# Patient Record
Sex: FEMALE | Race: WHITE | NOT HISPANIC OR LATINO | Employment: OTHER | URBAN - METROPOLITAN AREA
[De-identification: names, ages, dates, MRNs, and addresses within clinical notes are randomized per-mention and may not be internally consistent; named-entity substitution may affect disease eponyms.]

---

## 2017-01-02 ENCOUNTER — OFFICE VISIT (OUTPATIENT)
Dept: DERMATOLOGY | Facility: CLINIC | Age: 67
End: 2017-01-02

## 2017-01-02 DIAGNOSIS — C84.00 MYCOSIS FUNGOIDES (H): ICD-10-CM

## 2017-01-02 NOTE — PROGRESS NOTES
[facility] Dermatology Phototherapy Record  1. 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    NO    2. The patient tolerated phototherapy without complication    Patient will return for next UVB treatment, per protocol.     Patient to see provider every 4-12 weeks for follow-up during treatment.      All questions and concerns discussed with patient in clinic today.      Jordyn Ricci

## 2017-01-06 ENCOUNTER — OFFICE VISIT (OUTPATIENT)
Dept: DERMATOLOGY | Facility: CLINIC | Age: 67
End: 2017-01-06

## 2017-01-06 DIAGNOSIS — C84.00 MYCOSIS FUNGOIDES (H): ICD-10-CM

## 2017-01-06 NOTE — PROGRESS NOTES
[facility] Dermatology Phototherapy Record  1. Lyndsey Hagen is a 66 year old female is here today for phototherapy (UVA1) treatment for Mycosis fungoides.        Changes or new medications since last treatment:   NO    New medical conditions or problems since last treatment:   NO    Any problems with last phototherapy treatment?    NO    2. The patient tolerated phototherapy without complication    Patient will return for next UVA1 treatment, per protocol.     Patient to see provider every 4-12 weeks for follow-up during treatment.      All questions and concerns discussed with patient in clinic today.      Anu Davidson

## 2017-01-09 ENCOUNTER — OFFICE VISIT (OUTPATIENT)
Dept: DERMATOLOGY | Facility: CLINIC | Age: 67
End: 2017-01-09

## 2017-01-09 DIAGNOSIS — C84.00 MYCOSIS FUNGOIDES (H): ICD-10-CM

## 2017-01-09 NOTE — PROGRESS NOTES
[facility] Dermatology Phototherapy Record  1. Lyndsey Hagen is a 66 year old female is here today for phototherapy (UVA1) treatment for Mycosis fungoides .        Changes or new medications since last treatment:   NO    New medical conditions or problems since last treatment:   NO    Any problems with last phototherapy treatment?    NO    2. The patient tolerated phototherapy without complication    Patient will return for next UVB treatment, per protocol.     Patient to see provider every 4-12 weeks for follow-up during treatment.      All questions and concerns discussed with patient in clinic today.      Anu Davidson

## 2017-01-13 ENCOUNTER — OFFICE VISIT (OUTPATIENT)
Dept: DERMATOLOGY | Facility: CLINIC | Age: 67
End: 2017-01-13

## 2017-01-13 DIAGNOSIS — C84.00 MYCOSIS FUNGOIDES (H): ICD-10-CM

## 2017-01-13 NOTE — PROGRESS NOTES
[facility] Dermatology Phototherapy Record  1. Lyndsey Hagen is a 66 year old female is here today for phototherapy (UVA1) treatment for Mycosis fungoides .        Changes or new medications since last treatment:   NO    New medical conditions or problems since last treatment:   NO    Any problems with last phototherapy treatment?    NO    2. The patient tolerated phototherapy without complication    Patient will return for next UVA1 treatment, per protocol.     Patient to see provider every 4-12 weeks for follow-up during treatment.      All questions and concerns discussed with patient in clinic today.      Anu Davidson

## 2017-01-17 ENCOUNTER — OFFICE VISIT (OUTPATIENT)
Dept: DERMATOLOGY | Facility: CLINIC | Age: 67
End: 2017-01-17

## 2017-01-17 DIAGNOSIS — C84.00 MYCOSIS FUNGOIDES (H): ICD-10-CM

## 2017-01-20 ENCOUNTER — OFFICE VISIT (OUTPATIENT)
Dept: DERMATOLOGY | Facility: CLINIC | Age: 67
End: 2017-01-20

## 2017-01-20 DIAGNOSIS — C84.00 MYCOSIS FUNGOIDES (H): ICD-10-CM

## 2017-01-20 NOTE — PROGRESS NOTES
[facility] Dermatology Phototherapy Record  1. Lyndsey Hagen is a 66 year old female is here today for phototherapy (UVA1) treatment for Mycosis fungoides .          Changes or new medications since last treatment:   NO    New medical conditions or problems since last treatment:   NO    Any problems with last phototherapy treatment?    NO    2. The patient tolerated phototherapy without complication    Patient will return for next UVA1 treatment, per protocol.      Patient to see provider every 4-12 weeks for follow-up during treatment.       All questions and concerns discussed with patient in clinic today.

## 2017-01-24 ENCOUNTER — OFFICE VISIT (OUTPATIENT)
Dept: DERMATOLOGY | Facility: CLINIC | Age: 67
End: 2017-01-24

## 2017-01-24 DIAGNOSIS — C84.00 MYCOSIS FUNGOIDES (H): ICD-10-CM

## 2017-01-24 NOTE — PROGRESS NOTES
[facility] Dermatology Phototherapy Record  1. Lyndsey Hagen is a 66 year old female is here today for phototherapy (UVA1) treatment for Mycosis fungoides .        Changes or new medications since last treatment:   NO    New medical conditions or problems since last treatment:   NO    Any problems with last phototherapy treatment?    NO    2. The patient tolerated phototherapy without complication    Patient will return for next UVA treatment, per protocol.     Patient to see provider every 4-12 weeks for follow-up during treatment.      All questions and concerns discussed with patient in clinic today.      Pooja Jordan

## 2017-01-27 ENCOUNTER — OFFICE VISIT (OUTPATIENT)
Dept: DERMATOLOGY | Facility: CLINIC | Age: 67
End: 2017-01-27

## 2017-01-27 DIAGNOSIS — C84.00 MYCOSIS FUNGOIDES (H): ICD-10-CM

## 2017-01-27 NOTE — PROGRESS NOTES
[facility] Dermatology Phototherapy Record  1. Lyndsey Hagen is a 66 year old female is here today for phototherapy (UVA) treatment for Mycosis fungoides.        Changes or new medications since last treatment:   NO    New medical conditions or problems since last treatment:   NO    Any problems with last phototherapy treatment?    NO    2. The patient tolerated phototherapy without complication    Patient will return for next UVA treatment, per protocol.     Patient to see provider every 4-12 weeks for follow-up during treatment.      All questions and concerns discussed with patient in clinic today.      Jordyn Ricci

## 2017-01-30 ENCOUNTER — OFFICE VISIT (OUTPATIENT)
Dept: DERMATOLOGY | Facility: CLINIC | Age: 67
End: 2017-01-30

## 2017-01-30 DIAGNOSIS — C84.A0 CUTANEOUS T-CELL LYMPHOMA (H): Primary | ICD-10-CM

## 2017-01-30 NOTE — PROGRESS NOTES
[facility] Dermatology Phototherapy Record  1. Lyndsey Hagen is a 66 year old female is here today for phototherapy (UVB) treatment for CTCL.        Changes or new medications since last treatment:   NO    New medical conditions or problems since last treatment:   NO    Any problems with last phototherapy treatment?    NO    2. The patient tolerated phototherapy without complication    Patient will return for next UVB treatment, per protocol.     Patient to see provider every 4-12 weeks for follow-up during treatment.      All questions and concerns discussed with patient in clinic today.      Anu Davidson

## 2017-02-03 ENCOUNTER — OFFICE VISIT (OUTPATIENT)
Dept: DERMATOLOGY | Facility: CLINIC | Age: 67
End: 2017-02-03

## 2017-02-03 DIAGNOSIS — C84.A0 CUTANEOUS T-CELL LYMPHOMA (H): ICD-10-CM

## 2017-02-03 NOTE — PROGRESS NOTES
[facility] Dermatology Phototherapy Record  1. Lyndsey Hagen is a 66 year old female is here today for phototherapy (UVA) treatment for Cutaneous T-cell lymphoma.        Changes or new medications since last treatment:   NO    New medical conditions or problems since last treatment:   NO    Any problems with last phototherapy treatment?    NO    2. The patient tolerated phototherapy without complication    Patient will return for next UVA treatment, per protocol.     Patient to see provider every 4-12 weeks for follow-up during treatment.      All questions and concerns discussed with patient in clinic today.      Jordyn Ricci

## 2017-02-20 ENCOUNTER — RECORDS - HEALTHEAST (OUTPATIENT)
Dept: ADMINISTRATIVE | Facility: OTHER | Age: 67
End: 2017-02-20

## 2017-02-20 ENCOUNTER — OFFICE VISIT (OUTPATIENT)
Dept: DERMATOLOGY | Facility: CLINIC | Age: 67
End: 2017-02-20

## 2017-02-20 VITALS — HEART RATE: 87 BPM | SYSTOLIC BLOOD PRESSURE: 118 MMHG | DIASTOLIC BLOOD PRESSURE: 75 MMHG

## 2017-02-20 DIAGNOSIS — C84.A8 CUTANEOUS T-CELL LYMPHOMA INVOLVING LYMPH NODES OF MULTIPLE REGIONS (H): Primary | ICD-10-CM

## 2017-02-20 ASSESSMENT — PAIN SCALES - GENERAL: PAINLEVEL: NO PAIN (0)

## 2017-02-20 NOTE — PROGRESS NOTES
"[facility] Dermatology Note      Dermatology Problem List:  FBSE 2/20/17  1. CTCL, stage IIB  -Previously treated with PUVA with good response. Treated at [place] prior to establishing care here. Stopped due to difficulty percuring oxsoralen and risks associated with PUVA.  -Switched to UVA1 (started 4/16)+ targretin 1% gel (added on 5/23/16) with good response.   -Triamcinolone 0.1% cream as needed for irritation from targretin gel.    Encounter Date: Feb 20, 2017    CC:   Chief Complaint   Patient presents with     Derm Problem     TCELL follow up, Lyndsey states \" I am good.\"         History of Present Illness:  Ms. Lyndsey Hagen is a 66 year old female who presents as a follow-up for CTCL. The patient was last seen 11/21/16 when she was continued on UVA1. Pt states that she is overall doing very well. She states that her involvement is somewhat improved. She is tolerating the UVA1 well and is not bothered by these treatments twice a week. She is continuing to use targretin as needed to more thicker, involved areas. She is currently using the targretin to a spot on her right hip with good effect. None of the areas are symptomatic at all. She is otherwise feeling very well without any complaints. She recently came back from a several week trip to [place] with her sisters. Denies any new, growing, changing, or otherwise bleeding areas.     Past Medical History:   Patient Active Problem List   Diagnosis     Cutaneous T-cell lymphoma (H)     Past Medical History   Diagnosis Date     Cutaneous T-cell lymphoma (H)      Past Surgical History   Procedure Laterality Date     No history of surgery  10/28/31     derm       Social History:  Son is ophthalmologist.  is OB/GYN.     Family History:  Not assessed today.    Medications:  Current Outpatient Prescriptions   Medication Sig Dispense Refill     triamcinolone (KENALOG) 0.1 % cream Use in morning to rash 454 g 5     Bexarotene (TARGRETIN) 1 % GEL Use " every other night to nightly 1 Tube 2     Multiple Vitamin (MULTIVITAMINS PO) Take  by mouth daily.       Probiotic Product (SOLUBLE FIBER/PROBIOTICS PO) Take 2 tablets by mouth daily.       KRILL OIL PO Take  by mouth daily.       Cholecalciferol (VITAMIN D-3 PO) Take 1 tablet by mouth daily.       Coenzyme Q10 (CO Q-10 PO) Take 1 tablet by mouth daily.          Allergies   Allergen Reactions     Nkda [No Known Drug Allergies]          Review of Systems:  -As per HPI  -Constitutional: The patient denies fatigue, fevers, chills, unintended weight loss, and night sweats.  -HEENT: Patient denies nonhealing oral sores.  -Skin: As above in HPI. No additional skin concerns.    Physical exam:  Vitals: /75  Pulse 87  GEN: This is a well developed, well-nourished female in no acute distress, in a pleasant mood.    SKIN: Total skin excluding the undergarment areas was performed. The exam included the head/face, neck, both arms, chest, back, abdomen, both legs, digits and/or nails.   -Skin is diffusely tanned.  -Scattered on the face, trunk, back, upper extremities, and left medial thigh, there are circular hypopigmented patches without epidermal change consistent with post-inflammatory hypopigmentation. Some have a slight pink erythema.  -On bilateral arms there are some scattered pink plaques.  -Right lateral hip with circular hypopigmented plaque with some nodularity and overlying scale  -Back with hypopigmented patches and diffuse roughness, but no raised plaques today.  -Numerous homogenous, regular appearing 2-6 mm light to dark brown macules on trunk, extremities, no concerning findings on dermoscopy  -No other lesions of concern on areas examined.   LYMPH NODES: No submandibular, cervical, axillary, or inguinal lymphadenopathy.    Impression/Plan:  1. CTCL Stage IIB with good response to UVA1. As patient is continuing to improve on current regimen of UVA1 + targretin we will plan to continue current treatment  and recheck in 3 months.     Continue UVA1 twice weekly.    Continue Targretin 1% gel once to twice daily as tolerated to bumpy/scaly remaining skin lesions. She has triamcinolone 0.1% cream at home in case targretin gel causes irritation.    RTC 3 months        Follow-up in 3 months, earlier for new or changing lesions.       Dr. Arlen Guillory staffed the patient.    Staff Involved:  Resident(Janak Guerrero)/Staff(as above)  .I was present for key portions of the history and exam.  See resident note for pertinent history, exam, and treatment plan.  Arlen Guillory MD

## 2017-02-20 NOTE — MR AVS SNAPSHOT
After Visit Summary   2/20/2017    Lyndsey Hagen    MRN: [id number]           Patient Information     Date Of Birth          1950        Visit Information        Provider Department      2/20/2017 10:15 AM Arlen Guillory MD [facility] Dermatology        Today's Diagnoses     Cutaneous T-cell lymphoma involving lymph nodes of multiple regions (H)    -  1       Follow-ups after your visit        Follow-up notes from your care team     Return in about 3 months (around 5/20/2017).      Your next 10 appointments already scheduled     Feb 22, 2017  7:00 AM CST   (Arrive by 6:45 AM)   Derm Light Extended with UC DERM LIGHT TREATMENT   [facility])    [address] [place] [address]   [phone]            Feb 24, 2017  7:30 AM CST   (Arrive by 7:15 AM)   Derm Light Extended with UC DERM LIGHT TREATMENT   [facility])    [address] [place] [phone]   [phone]            Feb 27, 2017  7:30 AM CST   (Arrive by 7:15 AM)   Derm Light Extended with UC DERM LIGHT TREATMENT   [facility] Dermatology [facility])    [address] [address]   [phone]            Mar 03, 2017  8:00 AM CST   (Arrive by 7:45 AM)   Derm Light Extended with UC DERM LIGHT TREATMENT   [facility])    [address] [place] [address]   [phone]            Mar 06, 2017  8:00 AM CST   (Arrive by 7:45 AM)   Derm Light Extended with UC DERM LIGHT TREATMENT   [facility])    [address] [place] [address]   [phone]            Mar 10, 2017  7:30 AM CST   (Arrive by 7:15 AM)   Derm Light Extended with UC DERM LIGHT TREATMENT   [facility] Dermatology ([facility])    [address] [place] [address]   [phone]            Mar 13, 2017  7:30 AM CDT   (Arrive by 7:15 AM)   Derm Light Extended with UC DERM LIGHT TREATMENT   [facility])    [address] [place] [address]   [phone]            Mar 15, 2017  8:00 AM CDT   (Arrive by 7:45 AM)   Derm Light Extended with UC DERM LIGHT TREATMENT   [facility])    [address] [place] [address]   [phone]            Mar 21, 2017  8:00 AM CDT   (Arrive by 7:45 AM)   Derm Light Extended with UC DERM LIGHT TREATMENT   [facility])    [address] [place] [address]   [phone]            Mar 24, 2017  7:30 AM CDT   (Arrive by 7:15 AM)   Derm Light Extended with UC DERM LIGHT TREATMENT   [facility])    [address] [place] [address]   [phone]              Who to contact     Please call your clinic at [phone] to:    Ask questions about your health    Make or cancel appointments    Discuss your medicines    Learn about your test results    Speak to your doctor   If you have compliments or concerns about an experience at your clinic, or if you wish to file a complaint, please contact [facility] Patient Relations at [phone] or email us at [email or web address]         Additional Information About Your Visit        MyChart Information     [employer] gives you secure access to your electronic health record. If you see a primary care provider, you can also send messages to your care team and make appointments. If you have questions, please call your primary care clinic.  If you do not have a primary care provider, please call [phone] and they will assist  you.      [employer] is an electronic gateway that provides easy, online access to your medical records. With [employer], you can request a clinic appointment, read your test results, renew a prescription or communicate with your care team.     To access your existing account, please contact your [facility] or call [phone] for assistance.        Care EveryWhere ID     This is your Care EveryWhere ID. This could be used by other organizations to access your [place] medical records  [id number]        Your Vitals Were     Pulse                   87            Blood Pressure from Last 3 Encounters:   02/20/17 118/75   11/21/16 120/75   10/11/16 106/68    Weight from Last 3 Encounters:   05/23/16 73.5 kg (162 lb)   04/25/16 72.6 kg (160 lb)   02/22/16 76.1 kg (167 lb 12.8 oz)              Today, you had the following     No orders found for display       Primary Care Provider Office Phone # Fax #    April Rosalva [phone]       [facility] [address] [facility] [address]        Thank you!     Thank you for choosing [facility] DERMATOLOGY  for your care. Our goal is always to provide you with excellent care. Hearing back from our patients is one way we can continue to improve our services. Please take a few minutes to complete the written survey that you may receive in the mail after your visit with us. Thank you!             Your Updated Medication List - Protect others around you: Learn how to safely use, store and throw away your medicines at www.disposemymeds.org.          This list is accurate as of: 2/20/17 11:18 AM.  Always use your most recent med list.                   Brand Name Dispense Instructions for use    Bexarotene 1 % Gel    TARGRETIN    1 Tube    Use every other night to nightly       CO Q-10 PO      Take 1 tablet by mouth daily.       KRILL OIL PO      Take  by mouth daily.       MULTIVITAMINS PO      Take  by mouth daily.       SOLUBLE  FIBER/PROBIOTICS PO      Take 2 tablets by mouth daily.       triamcinolone 0.1 % cream    KENALOG    454 g    Use in morning to rash       VITAMIN D-3 PO      Take 1 tablet by mouth daily.

## 2017-02-20 NOTE — LETTER
"2/20/2017       RE: Lyndsey Hagen  [address] [id number]     Dear Colleague,    Thank you for referring your patient, Lyndsey Hagen, to the [facility] DERMATOLOGY at [facility]. Please see a copy of my visit note below.    [facility] Dermatology Note      Dermatology Problem List:  FBSE 2/20/17  1. CTCL, stage IIB  -Previously treated with PUVA with good response. Treated at [place] prior to establishing care here. Stopped due to difficulty percuring oxsoralen and risks associated with PUVA.  -Switched to UVA1 (started 4/16)+ targretin 1% gel (added on 5/23/16) with good response.   -Triamcinolone 0.1% cream as needed for irritation from targretin gel.    Encounter Date: Feb 20, 2017    CC:   Chief Complaint   Patient presents with     Derm Problem     TCELL follow up, Lyndsey states \" I am good.\"         History of Present Illness:  Ms. Lyndsey Hagen is a 66 year old female who presents as a follow-up for CTCL. The patient was last seen 11/21/16 when she was continued on UVA1. Pt states that she is overall doing very well. She states that her involvement is somewhat improved. She is tolerating the UVA1 well and is not bothered by these treatments twice a week. She is continuing to use targretin as needed to more thicker, involved areas. She is currently using the targretin to a spot on her right hip with good effect. None of the areas are symptomatic at all. She is otherwise feeling very well without any complaints. She recently came back from a several week trip to [place] with her sisters. Denies any new, growing, changing, or otherwise bleeding areas.     Past Medical History:   Patient Active Problem List   Diagnosis     Cutaneous T-cell lymphoma (H)     Past Medical History   Diagnosis Date     Cutaneous T-cell lymphoma (H)      Past Surgical History   Procedure Laterality Date     No history of surgery  10/28/31     derm       Social History:  Son is " ophthalmologist.  is OB/GYN.     Family History:  Not assessed today.    Medications:  Current Outpatient Prescriptions   Medication Sig Dispense Refill     triamcinolone (KENALOG) 0.1 % cream Use in morning to rash 454 g 5     Bexarotene (TARGRETIN) 1 % GEL Use every other night to nightly 1 Tube 2     Multiple Vitamin (MULTIVITAMINS PO) Take  by mouth daily.       Probiotic Product (SOLUBLE FIBER/PROBIOTICS PO) Take 2 tablets by mouth daily.       KRILL OIL PO Take  by mouth daily.       Cholecalciferol (VITAMIN D-3 PO) Take 1 tablet by mouth daily.       Coenzyme Q10 (CO Q-10 PO) Take 1 tablet by mouth daily.          Allergies   Allergen Reactions     Nkda [No Known Drug Allergies]          Review of Systems:  -As per HPI  -Constitutional: The patient denies fatigue, fevers, chills, unintended weight loss, and night sweats.  -HEENT: Patient denies nonhealing oral sores.  -Skin: As above in HPI. No additional skin concerns.    Physical exam:  Vitals: /75  Pulse 87  GEN: This is a well developed, well-nourished female in no acute distress, in a pleasant mood.    SKIN: Total skin excluding the undergarment areas was performed. The exam included the head/face, neck, both arms, chest, back, abdomen, both legs, digits and/or nails.   -Skin is diffusely tanned.  -Scattered on the face, trunk, back, upper extremities, and left medial thigh, there are circular hypopigmented patches without epidermal change consistent with post-inflammatory hypopigmentation. Some have a slight pink erythema.  -On bilateral arms there are some scattered pink plaques.  -Right lateral hip with circular hypopigmented plaque with some nodularity and overlying scale  -Back with hypopigmented patches and diffuse roughness, but no raised plaques today.  -Numerous homogenous, regular appearing 2-6 mm light to dark brown macules on trunk, extremities, no concerning findings on dermoscopy  -No other lesions of concern on areas examined.    LYMPH NODES: No submandibular, cervical, axillary, or inguinal lymphadenopathy.    Impression/Plan:  1. CTCL Stage IIB with good response to UVA1. As patient is continuing to improve on current regimen of UVA1 + targretin we will plan to continue current treatment and recheck in 3 months.     Continue UVA1 twice weekly.    Continue Targretin 1% gel once to twice daily as tolerated to bumpy/scaly remaining skin lesions. She has triamcinolone 0.1% cream at home in case targretin gel causes irritation.    RTC 3 months        Follow-up in 3 months, earlier for new or changing lesions.       Dr. Arlen Guillory staffed the patient.    Staff Involved:  Resident(Janak Guerrero)/Staff(as above)  .I was present for key portions of the history and exam.  See resident note for pertinent history, exam, and treatment plan.  Arlen Guillory MD

## 2017-02-20 NOTE — NURSING NOTE
"Dermatology Rooming Note    Lyndsey Hagen's goals for this visit include:   Chief Complaint   Patient presents with     Derm Problem     TCJANIE follow up, Lyndsey states \" I am good.\"     Debbi Busby LPN  "

## 2017-02-22 ENCOUNTER — OFFICE VISIT (OUTPATIENT)
Dept: DERMATOLOGY | Facility: CLINIC | Age: 67
End: 2017-02-22

## 2017-02-22 DIAGNOSIS — C84.A0 CUTANEOUS T-CELL LYMPHOMA (H): ICD-10-CM

## 2017-02-22 NOTE — PROGRESS NOTES
[facility] Dermatology Phototherapy Record  1. Lyndsey Hagen is a 66 year old female is here today for phototherapy (UVA1) treatment for Cutaneous T-cell lymphoma.        Changes or new medications since last treatment:   NO    New medical conditions or problems since last treatment:   NO    Any problems with last phototherapy treatment?    NO    2. The patient tolerated phototherapy without complication    Patient will return for next UVA1 treatment, per protocol.     Patient to see provider every 4-12 weeks for follow-up during treatment.      All questions and concerns discussed with patient in clinic today.      Anu Davidson

## 2017-02-22 NOTE — MR AVS SNAPSHOT
After Visit Summary   2/22/2017    Lyndsey Hagen    MRN: [id number]           Patient Information     Date Of Birth          1950        Visit Information        Provider Department      2/22/2017 7:00 AM UC DERM LIGHT TREATMENT [facility] Dermatology        Today's Diagnoses     Cutaneous T-cell lymphoma (H)           Follow-ups after your visit        Your next 10 appointments already scheduled     Feb 24, 2017  7:30 AM CST   (Arrive by 7:15 AM)   Derm Light Extended with UC DERM LIGHT TREATMENT   [facility])    [address] [place] [address]   [phone]            Feb 27, 2017  7:30 AM CST   (Arrive by 7:15 AM)   Derm Light Extended with UC DERM LIGHT TREATMENT   [facility])    [address] [place] [address]   [phone]            Mar 03, 2017  8:00 AM CST   (Arrive by 7:45 AM)   Derm Light Extended with UC DERM LIGHT TREATMENT   [facility])    [address] [place] [address]   [phone]            Mar 06, 2017  8:00 AM CST   (Arrive by 7:45 AM)   Derm Light Extended with UC DERM LIGHT TREATMENT   [facility])    [address] [place] [address]   [phone]            Mar 10, 2017  7:30 AM CST   (Arrive by 7:15 AM)   Derm Light Extended with UC DERM LIGHT TREATMENT   [facility])    [address] [place] [address]   [phone]            Mar 13, 2017  7:30 AM CDT   (Arrive by 7:15 AM)   Derm Light Extended with UC DERM LIGHT TREATMENT   [facility])    [address] [place] [address]   [phone]            Mar 15, 2017  8:00 AM CDT   (Arrive by 7:45  AM)   Derm Light Extended with UC DERM LIGHT TREATMENT   [facility] ([facility])    [address] [address]   [phone]            Mar 21, 2017  8:00 AM CDT   (Arrive by 7:45 AM)   Derm Light Extended with UC DERM LIGHT TREATMENT   [facility] ([facility])    [address] [address] [phone]            Mar 24, 2017  7:30 AM CDT   (Arrive by 7:15 AM)   Derm Light Extended with UC DERM LIGHT TREATMENT   [facility] ([facility])    [address] [address]   [phone]            Mar 27, 2017  7:30 AM CDT   (Arrive by 7:15 AM)   Derm Light Extended with UC DERM LIGHT TREATMENT   [facility] ([facility])    [address] [place] [id number] [phone]              Who to contact     Please call your clinic at [phone] to:    Ask questions about your health    Make or cancel appointments    Discuss your medicines    Learn about your test results    Speak to your doctor   If you have compliments or concerns about an experience at your clinic, or if you wish to file a complaint, please contact [facility] Patient Relations at [phone] or email us at [email or web address]         Additional Information About Your Visit        [employer] Information     [employer] gives you secure access to your electronic health record. If you see a primary care provider, you can also send messages to your care team and make appointments. If you have questions, please call your primary care clinic.  If you do not have a primary care provider, please call [phone] and they will assist you.      [employer] is an electronic gateway that provides easy, online access to your medical records. With [employer], you can request a clinic  appointment, read your test results, renew a prescription or communicate with your care team.     To access your existing account, please contact your [facility] or call [phone] for assistance.        Care EveryWhere ID     This is your Care EveryWhere ID. This could be used by other organizations to access your [place] medical records  [id number]         Blood Pressure from Last 3 Encounters:   02/20/17 118/75   11/21/16 120/75   10/11/16 106/68    Weight from Last 3 Encounters:   05/23/16 73.5 kg (162 lb)   04/25/16 72.6 kg (160 lb)   02/22/16 76.1 kg (167 lb 12.8 oz)              We Performed the Following     CHARGE - PHOTOTHERAPY - UVA1     PROCEDURE - PHOTOTHERAPY UVA1        Primary Care Provider Office Phone # Fax #    April Rosalva [phone]       [facility] [address] [place] [address]        Thank you!     Thank you for choosing [facility] DERMATOLOGY  for your care. Our goal is always to provide you with excellent care. Hearing back from our patients is one way we can continue to improve our services. Please take a few minutes to complete the written survey that you may receive in the mail after your visit with us. Thank you!             Your Updated Medication List - Protect others around you: Learn how to safely use, store and throw away your medicines at www.disposemymeds.org.          This list is accurate as of: 2/22/17  8:21 AM.  Always use your most recent med list.                   Brand Name Dispense Instructions for use    Bexarotene 1 % Gel    TARGRETIN    1 Tube    Use every other night to nightly       CO Q-10 PO      Take 1 tablet by mouth daily.       KRILL OIL PO      Take  by mouth daily.       MULTIVITAMINS PO      Take  by mouth daily.       SOLUBLE FIBER/PROBIOTICS PO      Take 2 tablets by mouth daily.       triamcinolone 0.1 % cream    KENALOG    454 g    Use in morning to rash       VITAMIN D-3 PO      Take  1 tablet by mouth daily.

## 2017-02-23 ENCOUNTER — OFFICE VISIT (OUTPATIENT)
Dept: DERMATOLOGY | Facility: CLINIC | Age: 67
End: 2017-02-23

## 2017-02-23 DIAGNOSIS — C84.A0 CUTANEOUS T-CELL LYMPHOMA (H): ICD-10-CM

## 2017-02-23 NOTE — MR AVS SNAPSHOT
After Visit Summary   2/23/2017    Lyndsey Hagen    MRN: [id number]           Patient Information     Date Of Birth          1950        Visit Information        Provider Department      2/23/2017 8:30 AM UC DERM LIGHT TREATMENT [facility] Dermatology        Today's Diagnoses     Cutaneous T-cell lymphoma (H)           Follow-ups after your visit        Your next 10 appointments already scheduled     Feb 27, 2017  7:30 AM CST   (Arrive by 7:15 AM)   Derm Light Extended with UC DERM LIGHT TREATMENT   [facility] ([facility])    [address] [facility]  3rd [facility] [address]   [phone]            Mar 03, 2017  8:00 AM CST   (Arrive by 7:45 AM)   Derm Light Extended with UC DERM LIGHT TREATMENT   [facility])    [address] [place] [address]   [phone]            Mar 06, 2017  8:00 AM CST   (Arrive by 7:45 AM)   Derm Light Extended with UC DERM LIGHT TREATMENT   [facility])    [address] [place] [address]   [phone]            Mar 10, 2017  7:30 AM CST   (Arrive by 7:15 AM)   Derm Light Extended with UC DERM LIGHT TREATMENT   [facility])    [address] [place] [address]   [phone]            Mar 13, 2017  7:30 AM CDT   (Arrive by 7:15 AM)   Derm Light Extended with UC DERM LIGHT TREATMENT   [facility])    [address] [place] [address]   [phone]            Mar 15, 2017  8:00 AM CDT   (Arrive by 7:45 AM)   Derm Light Extended with UC DERM LIGHT TREATMENT   [facility])    [address] [place] [address]  3rd [facility] [address]   [phone]            Mar 21, 2017  8:00 AM CDT   (Arrive by 7:45  AM)   Derm Light Extended with UC DERM LIGHT TREATMENT   [facility] ([facility])    [address] [address]   [phone]            Mar 24, 2017  7:30 AM CDT   (Arrive by 7:15 AM)   Derm Light Extended with UC DERM LIGHT TREATMENT   [facility] ([facility])    [address] [address] [phone]            Mar 27, 2017  7:30 AM CDT   (Arrive by 7:15 AM)   Derm Light Extended with UC DERM LIGHT TREATMENT   [facility] ([facility])    [address] [address]   [phone]            Mar 31, 2017  7:30 AM CDT   (Arrive by 7:15 AM)   Derm Light Extended with UC DERM LIGHT TREATMENT   [facility] ([facility])    [address] [address] [phone]              Who to contact     Please call your clinic at [phone] to:    Ask questions about your health    Make or cancel appointments    Discuss your medicines    Learn about your test results    Speak to your doctor   If you have compliments or concerns about an experience at your clinic, or if you wish to file a complaint, please contact [facility] Patient Relations at [phone] or email us at [email or web address]         Additional Information About Your Visit        [employer] Information     [email or web address] gives you secure access to your electronic health record. If you see a primary care provider, you can also send messages to your care team and make appointments. If you have questions, please call your primary care clinic.  If you do not have a primary care provider, please call [phone] and they will assist you.      [email or web address] is an electronic gateway that provides easy, online access to your medical records. With [email or web address], you can request a clinic  appointment, read your test results, renew a prescription or communicate with your care team.     To access your existing account, please contact your [facility] or call [phone] for assistance.        Care EveryWhere ID     This is your Care EveryWhere ID. This could be used by other organizations to access your [place] medical records  [id number]         Blood Pressure from Last 3 Encounters:   02/20/17 118/75   11/21/16 120/75   10/11/16 106/68    Weight from Last 3 Encounters:   05/23/16 73.5 kg (162 lb)   04/25/16 72.6 kg (160 lb)   02/22/16 76.1 kg (167 lb 12.8 oz)              We Performed the Following     CHARGE - PHOTOTHERAPY - UVA1     PROCEDURE - PHOTOTHERAPY UVA1        Primary Care Provider Office Phone # Fax #    April Rosalva [phone]       [facility] [address] [place] [address]        Thank you!     Thank you for choosing [facility] DERMATOLOGY  for your care. Our goal is always to provide you with excellent care. Hearing back from our patients is one way we can continue to improve our services. Please take a few minutes to complete the written survey that you may receive in the mail after your visit with us. Thank you!             Your Updated Medication List - Protect others around you: Learn how to safely use, store and throw away your medicines at www.disposemymeds.org.          This list is accurate as of: 2/23/17 10:05 AM.  Always use your most recent med list.                   Brand Name Dispense Instructions for use    Bexarotene 1 % Gel    TARGRETIN    1 Tube    Use every other night to nightly       CO Q-10 PO      Take 1 tablet by mouth daily.       KRILL OIL PO      Take  by mouth daily.       MULTIVITAMINS PO      Take  by mouth daily.       SOLUBLE FIBER/PROBIOTICS PO      Take 2 tablets by mouth daily.       triamcinolone 0.1 % cream    KENALOG    454 g    Use in morning to rash       VITAMIN D-3 PO      Take  1 tablet by mouth daily.

## 2017-02-23 NOTE — PROGRESS NOTES
[facility] Dermatology Phototherapy Record  1. Lyndsey Hagen is a 66 year old female is here today for phototherapy (UVA1) treatment for Cutaneous T-cell lymphoma .        Changes or new medications since last treatment:   NO    New medical conditions or problems since last treatment:   NO    Any problems with last phototherapy treatment?    NO    2. The patient tolerated phototherapy without complication    Patient will return for next UVA1 treatment, per protocol.     Patient to see provider every 4-12 weeks for follow-up during treatment.      All questions and concerns discussed with patient in clinic today.      Stephany Mercer

## 2017-02-27 ENCOUNTER — OFFICE VISIT (OUTPATIENT)
Dept: DERMATOLOGY | Facility: CLINIC | Age: 67
End: 2017-02-27

## 2017-02-27 DIAGNOSIS — C84.A0 CUTANEOUS T-CELL LYMPHOMA (H): ICD-10-CM

## 2017-02-27 NOTE — MR AVS SNAPSHOT
After Visit Summary   2/27/2017    Lyndsey Hagen    MRN: [id number]           Patient Information     Date Of Birth          1950        Visit Information        Provider Department      2/27/2017 7:30 AM UC DERM LIGHT TREATMENT [facility] Dermatology        Today's Diagnoses     Cutaneous T-cell lymphoma (H)           Follow-ups after your visit        Your next 10 appointments already scheduled     Mar 03, 2017  8:00 AM CST   (Arrive by 7:45 AM)   Derm Light Extended with UC DERM LIGHT TREATMENT   [facility] ([facility])    [address] [place] [address]  3rd [facility] [address]   [phone]            Mar 06, 2017  8:00 AM CST   (Arrive by 7:45 AM)   Derm Light Extended with UC DERM LIGHT TREATMENT   [facility])    [address] [place] [address]   [phone]            Mar 10, 2017  8:00 AM CST   (Arrive by 7:45 AM)   Derm Light Extended with UC DERM LIGHT TREATMENT   [facility])    [address] [place] [address]   [phone]            Mar 13, 2017  7:30 AM CDT   (Arrive by 7:15 AM)   Derm Light Extended with UC DERM LIGHT TREATMENT   [facility])    [address] [place] [address]   [phone]            Mar 15, 2017  8:00 AM CDT   (Arrive by 7:45 AM)   Derm Light Extended with UC DERM LIGHT TREATMENT   [facility])    [address] [place] [address]   [phone]            Mar 21, 2017  8:00 AM CDT   (Arrive by 7:45 AM)   Derm Light Extended with UC DERM LIGHT TREATMENT   [facility])    [address] [facility]  3rd [facility] [address]   [phone]            Mar 24, 2017  7:30 AM CDT   (Arrive by 7:15  AM)   Derm Light Extended with UC DERM LIGHT TREATMENT   [facility] ([facility])    [address] [address]   [phone]            Mar 27, 2017  7:30 AM CDT   (Arrive by 7:15 AM)   Derm Light Extended with UC DERM LIGHT TREATMENT   [facility] ([facility])    [address] [address]   [phone]            Mar 31, 2017  7:30 AM CDT   (Arrive by 7:15 AM)   Derm Light Extended with UC DERM LIGHT TREATMENT   [facility] ([facility])    [address] [address]   [phone]            Apr 03, 2017  8:00 AM CDT   (Arrive by 7:45 AM)   Derm Light Extended with UC DERM LIGHT TREATMENT   [facility] ([facility])    [address] [place] [id number] [phone]              Who to contact     Please call your clinic at [phone] to:    Ask questions about your health    Make or cancel appointments    Discuss your medicines    Learn about your test results    Speak to your doctor   If you have compliments or concerns about an experience at your clinic, or if you wish to file a complaint, please contact [facility] Patient Relations at [phone] or email us at [email or web address]         Additional Information About Your Visit        [employer] Information     [employer] gives you secure access to your electronic health record. If you see a primary care provider, you can also send messages to your care team and make appointments. If you have questions, please call your primary care clinic.  If you do not have a primary care provider, please call [phone] and they will assist you.      [employer] is an electronic gateway that provides easy, online access to your medical records. With [employer], you can request a clinic  appointment, read your test results, renew a prescription or communicate with your care team.     To access your existing account, please contact your [facility] or call [phone] for assistance.        Care EveryWhere ID     This is your Care EveryWhere ID. This could be used by other organizations to access your [place] medical records  [id number]         Blood Pressure from Last 3 Encounters:   02/20/17 118/75   11/21/16 120/75   10/11/16 106/68    Weight from Last 3 Encounters:   05/23/16 73.5 kg (162 lb)   04/25/16 72.6 kg (160 lb)   02/22/16 76.1 kg (167 lb 12.8 oz)              We Performed the Following     CHARGE - PHOTOTHERAPY - UVA1     PROCEDURE - PHOTOTHERAPY UVA1        Primary Care Provider Office Phone # Fax #    April Rosalva [phone]       [facility] [address] [place] [address]        Thank you!     Thank you for choosing [facility] DERMATOLOGY  for your care. Our goal is always to provide you with excellent care. Hearing back from our patients is one way we can continue to improve our services. Please take a few minutes to complete the written survey that you may receive in the mail after your visit with us. Thank you!             Your Updated Medication List - Protect others around you: Learn how to safely use, store and throw away your medicines at www.disposemymeds.org.          This list is accurate as of: 2/27/17  8:16 AM.  Always use your most recent med list.                   Brand Name Dispense Instructions for use    Bexarotene 1 % Gel    TARGRETIN    1 Tube    Use every other night to nightly       CO Q-10 PO      Take 1 tablet by mouth daily.       KRILL OIL PO      Take  by mouth daily.       MULTIVITAMINS PO      Take  by mouth daily.       SOLUBLE FIBER/PROBIOTICS PO      Take 2 tablets by mouth daily.       triamcinolone 0.1 % cream    KENALOG    454 g    Use in morning to rash       VITAMIN D-3 PO      Take  1 tablet by mouth daily.

## 2017-02-27 NOTE — PROGRESS NOTES
[facility] Dermatology Phototherapy Record  1. Lyndsey Hagen is a 66 year old female is here today for phototherapy (UVA1) treatment for Cutaneous T-cell lymphoma .        Changes or new medications since last treatment:   NO    New medical conditions or problems since last treatment:   NO    Any problems with last phototherapy treatment?    NO    2. The patient tolerated phototherapy without complication    Patient will return for next UVA1 treatment, per protocol.     Patient to see provider every 4-12 weeks for follow-up during treatment.      All questions and concerns discussed with patient in clinic today.      Anu Davidson

## 2017-03-03 ENCOUNTER — OFFICE VISIT (OUTPATIENT)
Dept: DERMATOLOGY | Facility: CLINIC | Age: 67
End: 2017-03-03

## 2017-03-03 DIAGNOSIS — C84.A0 CUTANEOUS T-CELL LYMPHOMA (H): ICD-10-CM

## 2017-03-03 NOTE — PROGRESS NOTES
[facility] Dermatology Phototherapy Record  1. Lyndsey Hagen is a 66 year old female is here today for phototherapy (UVA1) treatment for Cutaneous T-cell lymphoma.        Changes or new medications since last treatment:   NO    New medical conditions or problems since last treatment:   NO    Any problems with last phototherapy treatment?    NO    2. The patient tolerated phototherapy without complication    Patient will return for next UVA1 treatment, per protocol.     Patient to see provider every 4-12 weeks for follow-up during treatment.      All questions and concerns discussed with patient in clinic today.      Anu Davidson

## 2017-03-03 NOTE — MR AVS SNAPSHOT
After Visit Summary   3/3/2017    Lyndsey Hagen    MRN: [id number]           Patient Information     Date Of Birth          1950        Visit Information        Provider Department      3/3/2017 8:00 AM UC DERM LIGHT TREATMENT [facility] Dermatology        Today's Diagnoses     Cutaneous T-cell lymphoma (H)           Follow-ups after your visit        Your next 10 appointments already scheduled     Mar 06, 2017  8:00 AM CST   (Arrive by 7:45 AM)   Derm Light Extended with UC DERM LIGHT TREATMENT   [facility] ([facility])    [address] [place] [address]  3rd [facility] [address]   [phone]            Mar 10, 2017  8:00 AM CST   (Arrive by 7:45 AM)   Derm Light Extended with UC DERM LIGHT TREATMENT   [facility])    [address] [place] [address]   [phone]            Mar 13, 2017  7:30 AM CDT   (Arrive by 7:15 AM)   Derm Light Extended with UC DERM LIGHT TREATMENT   [facility])    [address] [place] [address]  3rd [facility] [address]   [phone]            Mar 15, 2017  8:00 AM CDT   (Arrive by 7:45 AM)   Derm Light Extended with UC DERM LIGHT TREATMENT   [facility])    [address] [address]   [phone]            Mar 21, 2017  8:00 AM CDT   (Arrive by 7:45 AM)   Derm Light Extended with UC DERM LIGHT TREATMENT   [facility])    [address] [place] [address]  3[address] [facility] [address]   [phone]            Mar 24, 2017  7:30 AM CDT   (Arrive by 7:15 AM)   Derm Light Extended with UC DERM LIGHT TREATMENT   [facility])    [address] [facility]  3rd [facility] [address]   [phone]            Mar 27, 2017  7:30 AM CDT   (Arrive by 7:15  AM)   Derm Light Extended with UC DERM LIGHT TREATMENT   [facility] Dermatology ([facility])    [address] [address]   [phone]            Mar 31, 2017  7:30 AM CDT   (Arrive by 7:15 AM)   Derm Light Extended with UC DERM LIGHT TREATMENT   [facility] Dermatology ([facility])    [address] [address]   [phone]            Apr 03, 2017  8:00 AM CDT   (Arrive by 7:45 AM)   Derm Light Extended with UC DERM LIGHT TREATMENT   [facility] Dermatology ([facility])    [address] [address]   [phone]            May 23, 2017 11:30 AM CDT   (Arrive by 11:15 AM)   Return T-Cell Visit with Arlen Guillory MD   [facility] Dermatology ([facility])    [address] [place] [address] [phone]              Who to contact     Please call your clinic at [phone] to:    Ask questions about your health    Make or cancel appointments    Discuss your medicines    Learn about your test results    Speak to your doctor   If you have compliments or concerns about an experience at your clinic, or if you wish to file a complaint, please contact [facility] Patient Relations at [phone] or email us at [email or web address]         Additional Information About Your Visit        [employer] Information     [employer] gives you secure access to your electronic health record. If you see a primary care provider, you can also send messages to your care team and make appointments. If you have questions, please call your primary care clinic.  If you do not have a primary care provider, please call [phone] and they will assist you.      [employer] is an electronic gateway that provides easy, online access to your medical records. With [employer], you can request a  clinic appointment, read your test results, renew a prescription or communicate with your care team.     To access your existing account, please contact your [facility] or call [phone] for assistance.        Care EveryWhere ID     This is your Care EveryWhere ID. This could be used by other organizations to access your [place] medical records  [id number]         Blood Pressure from Last 3 Encounters:   02/20/17 118/75   11/21/16 120/75   10/11/16 106/68    Weight from Last 3 Encounters:   05/23/16 73.5 kg (162 lb)   04/25/16 72.6 kg (160 lb)   02/22/16 76.1 kg (167 lb 12.8 oz)              We Performed the Following     CHARGE - PHOTOTHERAPY - UVA1     PROCEDURE - PHOTOTHERAPY UVA1        Primary Care Provider Office Phone # Fax #    April Rosalva [phone]       [facility] [address] [place] [address]        Thank you!     Thank you for choosing [facility] DERMATOLOGY  for your care. Our goal is always to provide you with excellent care. Hearing back from our patients is one way we can continue to improve our services. Please take a few minutes to complete the written survey that you may receive in the mail after your visit with us. Thank you!             Your Updated Medication List - Protect others around you: Learn how to safely use, store and throw away your medicines at www.disposemymeds.org.          This list is accurate as of: 3/3/17  8:39 AM.  Always use your most recent med list.                   Brand Name Dispense Instructions for use    Bexarotene 1 % Gel    TARGRETIN    1 Tube    Use every other night to nightly       CO Q-10 PO      Take 1 tablet by mouth daily.       KRILL OIL PO      Take  by mouth daily.       MULTIVITAMINS PO      Take  by mouth daily.       SOLUBLE FIBER/PROBIOTICS PO      Take 2 tablets by mouth daily.       triamcinolone 0.1 % cream    KENALOG    454 g    Use in morning to rash       VITAMIN D-3 PO       Take 1 tablet by mouth daily.

## 2017-03-06 ENCOUNTER — OFFICE VISIT (OUTPATIENT)
Dept: DERMATOLOGY | Facility: CLINIC | Age: 67
End: 2017-03-06

## 2017-03-06 DIAGNOSIS — C84.A0 CUTANEOUS T-CELL LYMPHOMA (H): ICD-10-CM

## 2017-03-06 NOTE — MR AVS SNAPSHOT
After Visit Summary   3/6/2017    Lyndsey Hagen    MRN: [id number]           Patient Information     Date Of Birth          1950        Visit Information        Provider Department      3/6/2017 8:00 AM UC DERM LIGHT TREATMENT [facility] Dermatology        Today's Diagnoses     Cutaneous T-cell lymphoma (H)           Follow-ups after your visit        Your next 10 appointments already scheduled     Mar 10, 2017  8:00 AM CST   (Arrive by 7:45 AM)   Derm Light Extended with UC DERM LIGHT TREATMENT   [facility] ([facility])    [address] [facility]  3rd [facility] [address]   [phone]            Mar 13, 2017  7:30 AM CDT   (Arrive by 7:15 AM)   Derm Light Extended with UC DERM LIGHT TREATMENT   [facility])    [address] [facility]  3rd [facility] [address]   [phone]            Mar 15, 2017  8:00 AM CDT   (Arrive by 7:45 AM)   Derm Light Extended with UC DERM LIGHT TREATMENT   [facility])    [address] [facility]  [address] [facility] [address]   [phone]            Mar 21, 2017  8:00 AM CDT   (Arrive by 7:45 AM)   Derm Light Extended with UC DERM LIGHT TREATMENT   [facility] ([facility])    [address] [facility]  3[address] [facility] [address]   [phone]            Mar 24, 2017  7:30 AM CDT   (Arrive by 7:15 AM)   Derm Light Extended with UC DERM LIGHT TREATMENT   [facility])    [address] [facility]  3[address] [facility] [address]   [phone]            Mar 27, 2017  7:30 AM CDT   (Arrive by 7:15 AM)   Derm Light Extended with UC DERM LIGHT TREATMENT   [facility])    [address] [facility]  [address] [facility] [address]   [phone]            Mar 31, 2017  7:30 AM CDT   (Arrive by 7:15  AM)   Derm Light Extended with UC DERM LIGHT TREATMENT   [facility] Dermatology ([facility])    [address] [address] [phone]            Apr 03, 2017  8:00 AM CDT   (Arrive by 7:45 AM)   Derm Light Extended with UC DERM LIGHT TREATMENT   [facility] Dermatology ([facility])    [address] [address] [phone]            May 23, 2017 11:30 AM CDT   (Arrive by 11:15 AM)   Return T-Cell Visit with Arlen Guillory MD   [facility] Dermatology ([facility])    [address] [place] [id number] [phone]              Who to contact     Please call your clinic at [phone] to:    Ask questions about your health    Make or cancel appointments    Discuss your medicines    Learn about your test results    Speak to your doctor   If you have compliments or concerns about an experience at your clinic, or if you wish to file a complaint, please contact [facility] Patient Relations at [phone] or email us at [email or web address]         Additional Information About Your Visit        [email or web address] Information     [facility] gives you secure access to your electronic health record. If you see a primary care provider, you can also send messages to your care team and make appointments. If you have questions, please call your primary care clinic.  If you do not have a primary care provider, please call [phone] and they will assist you.      [facility] is an electronic gateway that provides easy, online access to your medical records. With [employer] [facility], you can request a clinic appointment, read your test results, renew a prescription or communicate with your care team.     To access your existing account, please contact your [facility] or call [phone] for assistance.        Care  EveryWhere ID     This is your Care EveryWhere ID. This could be used by other organizations to access your [place] medical records  [id number]         Blood Pressure from Last 3 Encounters:   02/20/17 118/75   11/21/16 120/75   10/11/16 106/68    Weight from Last 3 Encounters:   05/23/16 73.5 kg (162 lb)   04/25/16 72.6 kg (160 lb)   02/22/16 76.1 kg (167 lb 12.8 oz)              We Performed the Following     CHARGE - PHOTOTHERAPY - UVA1     PROCEDURE - PHOTOTHERAPY UVA1        Primary Care Provider Office Phone # Fax #    April Rosalva [phone]       [facility] [address] [place] [address]        Thank you!     Thank you for choosing [facility] DERMATOLOGY  for your care. Our goal is always to provide you with excellent care. Hearing back from our patients is one way we can continue to improve our services. Please take a few minutes to complete the written survey that you may receive in the mail after your visit with us. Thank you!             Your Updated Medication List - Protect others around you: Learn how to safely use, store and throw away your medicines at www.disposemymeds.org.          This list is accurate as of: 3/6/17  9:01 AM.  Always use your most recent med list.                   Brand Name Dispense Instructions for use    Bexarotene 1 % Gel    TARGRETIN    1 Tube    Use every other night to nightly       CO Q-10 PO      Take 1 tablet by mouth daily.       KRILL OIL PO      Take  by mouth daily.       MULTIVITAMINS PO      Take  by mouth daily.       SOLUBLE FIBER/PROBIOTICS PO      Take 2 tablets by mouth daily.       triamcinolone 0.1 % cream    KENALOG    454 g    Use in morning to rash       VITAMIN D-3 PO      Take 1 tablet by mouth daily.

## 2017-03-06 NOTE — PROGRESS NOTES
[facility] Dermatology Phototherapy Record  1. Lyndsey Hagen is a 66 year old female is here today for phototherapy (UVA1) treatment for Cutaneous T-cell lymphom .        Changes or new medications since last treatment:   NO    New medical conditions or problems since last treatment:   NO    Any problems with last phototherapy treatment?    NO    2. The patient tolerated phototherapy without complication    Patient will return for next UVA1 treatment, per protocol.     Patient to see provider every 4-12 weeks for follow-up during treatment.      All questions and concerns discussed with patient in clinic today.      Stephany Mercer

## 2017-03-10 ENCOUNTER — OFFICE VISIT (OUTPATIENT)
Dept: DERMATOLOGY | Facility: CLINIC | Age: 67
End: 2017-03-10

## 2017-03-10 DIAGNOSIS — C84.A0 CUTANEOUS T-CELL LYMPHOMA (H): ICD-10-CM

## 2017-03-10 NOTE — MR AVS SNAPSHOT
After Visit Summary   3/10/2017    Lyndsey Hagen    MRN: [id number]           Patient Information     Date Of Birth          1950        Visit Information        Provider Department      3/10/2017 8:00 AM UC DERM LIGHT TREATMENT [facility] Dermatology        Today's Diagnoses     Cutaneous T-cell lymphoma (H)           Follow-ups after your visit        Your next 10 appointments already scheduled     Mar 13, 2017  7:30 AM CDT   (Arrive by 7:15 AM)   Derm Light Extended with UC DERM LIGHT TREATMENT   [facility])    [address] [facility]  3rd [facility] [address]   [phone]            Mar 15, 2017  8:00 AM CDT   (Arrive by 7:45 AM)   Derm Light Extended with UC DERM LIGHT TREATMENT   [facility])    [address] [place] [address]   [phone]            Mar 21, 2017  8:00 AM CDT   (Arrive by 7:45 AM)   Derm Light Extended with UC DERM LIGHT TREATMENT   [facility])    [address] [facility]  3[address] [facility] [address]   [phone]            Mar 24, 2017  7:30 AM CDT   (Arrive by 7:15 AM)   Derm Light Extended with UC DERM LIGHT TREATMENT   [facility])    [address] [address]   [phone]            Mar 27, 2017  7:30 AM CDT   (Arrive by 7:15 AM)   Derm Light Extended with UC DERM LIGHT TREATMENT   [facility])    [address] [place] [address]   [phone]            Mar 31, 2017  7:30 AM CDT   (Arrive by 7:15 AM)   Derm Light Extended with UC DERM LIGHT TREATMENT   [facility])    [address] [facility]  3[address] [facility] [address]   [phone]            Apr 03, 2017  8:00 AM CDT   (Arrive by 7:45  AM)   Derm Light Extended with  DERM LIGHT TREATMENT   [facility] Dermatology ([facility])    [address] [address] [phone]            May 23, 2017 11:30 AM CDT   (Arrive by 11:15 AM)   Return T-Cell Visit with Arlen Guillory MD   [facility] Dermatology ([facility])    [address] [address] [phone]              Who to contact     Please call your clinic at [phone] to:    Ask questions about your health    Make or cancel appointments    Discuss your medicines    Learn about your test results    Speak to your doctor   If you have compliments or concerns about an experience at your clinic, or if you wish to file a complaint, please contact [facility] Patient Relations at [phone] or email us at [email or web address]         Additional Information About Your Visit        [employer] Information     [employer] gives you secure access to your electronic health record. If you see a primary care provider, you can also send messages to your care team and make appointments. If you have questions, please call your primary care clinic.  If you do not have a primary care provider, please call [phone] and they will assist you.      [employer] is an electronic gateway that provides easy, online access to your medical records. With [employer], you can request a clinic appointment, read your test results, renew a prescription or communicate with your care team.     To access your existing account, please contact your [facility] or call [phone] for assistance.        Care EveryWhere ID     This is your Care EveryWhere ID. This could be used by other organizations to access your [place] medical records  [id number]         Blood Pressure from Last 3 Encounters:   02/20/17 118/75   11/21/16 120/75   10/11/16 106/68     Weight from Last 3 Encounters:   05/23/16 73.5 kg (162 lb)   04/25/16 72.6 kg (160 lb)   02/22/16 76.1 kg (167 lb 12.8 oz)              We Performed the Following     CHARGE - PHOTOTHERAPY - UVA1     PROCEDURE - PHOTOTHERAPY UVA1        Primary Care Provider Office Phone # Fax #    April Rosalva [phone]       [address] [address]        Thank you!     Thank you for choosing [facility] DERMATOLOGY  for your care. Our goal is always to provide you with excellent care. Hearing back from our patients is one way we can continue to improve our services. Please take a few minutes to complete the written survey that you may receive in the mail after your visit with us. Thank you!             Your Updated Medication List - Protect others around you: Learn how to safely use, store and throw away your medicines at www.disposemymeds.org.          This list is accurate as of: 3/10/17  9:40 AM.  Always use your most recent med list.                   Brand Name Dispense Instructions for use    Bexarotene 1 % Gel    TARGRETIN    1 Tube    Use every other night to nightly       CO Q-10 PO      Take 1 tablet by mouth daily.       KRILL OIL PO      Take  by mouth daily.       MULTIVITAMINS PO      Take  by mouth daily.       SOLUBLE FIBER/PROBIOTICS PO      Take 2 tablets by mouth daily.       triamcinolone 0.1 % cream    KENALOG    454 g    Use in morning to rash       VITAMIN D-3 PO      Take 1 tablet by mouth daily.

## 2017-03-10 NOTE — PROGRESS NOTES
[facility] Dermatology Phototherapy Record  1. Lyndsey Hagen is a 66 year old female is here today for phototherapy (UVA1) treatment forCutaneous T-cell lymphoma .        Changes or new medications since last treatment:   NO    New medical conditions or problems since last treatment:   NO    Any problems with last phototherapy treatment?    NO    2. The patient tolerated phototherapy without complication    Patient will return for next UVA1 treatment, per protocol.     Patient to see provider every 4-12 weeks for follow-up during treatment.      All questions and concerns discussed with patient in clinic today.      Stephany Mercer

## 2017-03-21 ENCOUNTER — OFFICE VISIT (OUTPATIENT)
Dept: DERMATOLOGY | Facility: CLINIC | Age: 67
End: 2017-03-21

## 2017-03-21 DIAGNOSIS — C84.A0 CUTANEOUS T-CELL LYMPHOMA (H): ICD-10-CM

## 2017-03-21 NOTE — PROGRESS NOTES
[facility] Dermatology Phototherapy Record  1. Lyndsey Hagen is a 66 year old female is here today for phototherapy (UVA1) treatment for Cutaneous T-cell lymphoma .        Changes or new medications since last treatment:   NO    New medical conditions or problems since last treatment:   NO    Any problems with last phototherapy treatment?    NO    2. The patient tolerated phototherapy without complication    Patient will return for next UVA treatment, per protocol.     Patient to see provider every 4-12 weeks for follow-up during treatment.      All questions and concerns discussed with patient in clinic today.      Pooja Jordan

## 2017-03-21 NOTE — MR AVS SNAPSHOT
After Visit Summary   3/21/2017    Lyndsey Hagen    MRN: [id number]           Patient Information     Date Of Birth          1950        Visit Information        Provider Department      3/21/2017 8:00 AM UC DERM LIGHT TREATMENT [facility] Dermatology        Today's Diagnoses     Cutaneous T-cell lymphoma (H)           Follow-ups after your visit        Your next 10 appointments already scheduled     Mar 24, 2017  7:30 AM CDT   (Arrive by 7:15 AM)   Derm Light Extended with UC DERM LIGHT TREATMENT   [facility] Dermatology ([facility])    [address] [place] [address]   [phone]            Mar 27, 2017  7:30 AM CDT   (Arrive by 7:15 AM)   Derm Light Extended with UC DERM LIGHT TREATMENT   [facility] ([facility])    [address] [place] [address]   [phone]            Mar 31, 2017  7:30 AM CDT   (Arrive by 7:15 AM)   Derm Light Extended with UC DERM LIGHT TREATMENT   [facility] ([facility])    [address] [place] [address]   [phone]            Apr 03, 2017  8:00 AM CDT   (Arrive by 7:45 AM)   Derm Light Extended with UC DERM LIGHT TREATMENT   [facility] ([facility])    [address] [place] [address] [phone]            May 23, 2017 11:30 AM CDT   (Arrive by 11:15 AM)   Return T-Cell Visit with Arlen Guillory MD   [facility])    [address] [place] [address] [phone]              Who to contact     Please call your clinic at [phone] to:    Ask questions about your health    Make or cancel appointments    Discuss your medicines    Learn about your test results    Speak to your doctor   If you have compliments or concerns about an experience at your clinic,  or if you wish to file a complaint, please contact [facility] Patient Relations at [phone] or email us at [email or web address]         Additional Information About Your Visit        [employer] Information     [employer] gives you secure access to your electronic health record. If you see a primary care provider, you can also send messages to your care team and make appointments. If you have questions, please call your primary care clinic.  If you do not have a primary care provider, please call [phone] and they will assist you.      [employer] is an electronic gateway that provides easy, online access to your medical records. With [employer], you can request a clinic appointment, read your test results, renew a prescription or communicate with your care team.     To access your existing account, please contact your [facility] or call [phone] for assistance.        Care EveryWhere ID     This is your Care EveryWhere ID. This could be used by other organizations to access your [place] medical records  [id number]         Blood Pressure from Last 3 Encounters:   02/20/17 118/75   11/21/16 120/75   10/11/16 106/68    Weight from Last 3 Encounters:   05/23/16 73.5 kg (162 lb)   04/25/16 72.6 kg (160 lb)   02/22/16 76.1 kg (167 lb 12.8 oz)              We Performed the Following     CHARGE - PHOTOTHERAPY - UVA1     PROCEDURE - PHOTOTHERAPY UVA1        Primary Care Provider Office Phone # Fax #    April Rosalva [phone]       [facility] [address] [facility] [address]        Thank you!     Thank you for choosing [facility] DERMATOLOGY  for your care. Our goal is always to provide you with excellent care. Hearing back from our patients is one way we can continue to improve our services. Please take a few minutes to complete the written survey that you may receive in the mail after your visit with us. Thank you!              Your Updated Medication List - Protect others around you: Learn how to safely use, store and throw away your medicines at www.disposemymeds.org.          This list is accurate as of: 3/21/17  8:46 AM.  Always use your most recent med list.                   Brand Name Dispense Instructions for use    Bexarotene 1 % Gel    TARGRETIN    1 Tube    Use every other night to nightly       CO Q-10 PO      Take 1 tablet by mouth daily.       KRILL OIL PO      Take  by mouth daily.       MULTIVITAMINS PO      Take  by mouth daily.       SOLUBLE FIBER/PROBIOTICS PO      Take 2 tablets by mouth daily.       triamcinolone 0.1 % cream    KENALOG    454 g    Use in morning to rash       VITAMIN D-3 PO      Take 1 tablet by mouth daily.

## 2017-03-27 ENCOUNTER — OFFICE VISIT (OUTPATIENT)
Dept: DERMATOLOGY | Facility: CLINIC | Age: 67
End: 2017-03-27

## 2017-03-27 DIAGNOSIS — C84.A0 CUTANEOUS T-CELL LYMPHOMA (H): ICD-10-CM

## 2017-03-27 NOTE — NURSING NOTE
[facility] Dermatology Phototherapy Record  1. Lyndsey Hagen is a 66 year old female is here today for phototherapy (UVA) treatment for CTCL.        Changes or new medications since last treatment:   NO    New medical conditions or problems since last treatment:   NO    Any problems with last phototherapy treatment?    NO    2. The patient tolerated phototherapy without complication    Patient will return for next UVA treatment, per protocol.     Patient to see provider every 4-12 weeks for follow-up during treatment.      All questions and concerns discussed with patient in clinic today.      Jordyn Ricci

## 2017-03-27 NOTE — MR AVS SNAPSHOT
After Visit Summary   3/27/2017    Lyndsey Hagen    MRN: [id number]           Patient Information     Date Of Birth          1950        Visit Information        Provider Department      3/27/2017 7:30 AM UC DERM LIGHT TREATMENT [facility] Dermatology        Today's Diagnoses     Cutaneous T-cell lymphoma (H)           Follow-ups after your visit        Your next 10 appointments already scheduled     Mar 31, 2017  7:30 AM CDT   (Arrive by 7:15 AM)   Derm Light Extended with UC DERM LIGHT TREATMENT   [facility] Dermatology ([facility])    [address] [place] [address]   [phone]            Apr 03, 2017  8:00 AM CDT   (Arrive by 7:45 AM)   Derm Light Extended with UC DERM LIGHT TREATMENT   [facility] ([facility])    [address] [place] [address]   [phone]            Apr 19, 2017  7:30 AM CDT   (Arrive by 7:15 AM)   Derm Light Extended with UC DERM LIGHT TREATMENT   [facility] Dermatology [facility])    [address] [place] [address]   [phone]            Apr 24, 2017  7:30 AM CDT   (Arrive by 7:15 AM)   Derm Light Extended with UC DERM LIGHT TREATMENT   [facility] ([facility])    [address] [place] [address]   [phone]            Apr 26, 2017 11:30 AM CDT   (Arrive by 11:15 AM)   Return T-Cell Visit with Arlen Guillory MD   [facility])    [address] [place] [address]   [phone]            Apr 26, 2017 12:00 PM CDT   (Arrive by 11:45 AM)   Derm Light Extended with UC DERM LIGHT TREATMENT   [facility])    [address] [place] [address]   [phone]              Who to contact     Please call your  clinic at [phone] to:    Ask questions about your health    Make or cancel appointments    Discuss your medicines    Learn about your test results    Speak to your doctor   If you have compliments or concerns about an experience at your clinic, or if you wish to file a complaint, please contact [facility] Patient Relations at [phone] or email us at [email or web address]         Additional Information About Your Visit        MyChart Information     [employer] gives you secure access to your electronic health record. If you see a primary care provider, you can also send messages to your care team and make appointments. If you have questions, please call your primary care clinic.  If you do not have a primary care provider, please call [phone] and they will assist you.      [employer] is an electronic gateway that provides easy, online access to your medical records. With [employer], you can request a clinic appointment, read your test results, renew a prescription or communicate with your care team.     To access your existing account, please contact your [facility] or call [phone] for assistance.        Care EveryWhere ID     This is your Care EveryWhere ID. This could be used by other organizations to access your [place] medical records  [id number]         Blood Pressure from Last 3 Encounters:   02/20/17 118/75   11/21/16 120/75   10/11/16 106/68    Weight from Last 3 Encounters:   05/23/16 73.5 kg (162 lb)   04/25/16 72.6 kg (160 lb)   02/22/16 76.1 kg (167 lb 12.8 oz)              We Performed the Following     CHARGE - PHOTOTHERAPY - UVA1     PROCEDURE - PHOTOTHERAPY UVA1        Primary Care Provider Office Phone # Fax #    April Rosalva [phone]       [facility] [address] [facility] [address]        Thank you!     Thank you for choosing [facility] DERMATOLOGY  for your care. Our goal is always to  provide you with excellent care. Hearing back from our patients is one way we can continue to improve our services. Please take a few minutes to complete the written survey that you may receive in the mail after your visit with us. Thank you!             Your Updated Medication List - Protect others around you: Learn how to safely use, store and throw away your medicines at www.disposemymeds.org.          This list is accurate as of: 3/27/17  8:23 AM.  Always use your most recent med list.                   Brand Name Dispense Instructions for use    Bexarotene 1 % Gel    TARGRETIN    1 Tube    Use every other night to nightly       CO Q-10 PO      Take 1 tablet by mouth daily.       KRILL OIL PO      Take  by mouth daily.       MULTIVITAMINS PO      Take  by mouth daily.       SOLUBLE FIBER/PROBIOTICS PO      Take 2 tablets by mouth daily.       triamcinolone 0.1 % cream    KENALOG    454 g    Use in morning to rash       VITAMIN D-3 PO      Take 1 tablet by mouth daily.

## 2017-03-31 ENCOUNTER — OFFICE VISIT (OUTPATIENT)
Dept: DERMATOLOGY | Facility: CLINIC | Age: 67
End: 2017-03-31

## 2017-03-31 DIAGNOSIS — C84.A0 CUTANEOUS T-CELL LYMPHOMA (H): ICD-10-CM

## 2017-03-31 NOTE — PROGRESS NOTES
[facility] Dermatology Phototherapy Record  1. Lyndsey Hagen is a 66 year old female is here today for phototherapy (UVA1) treatment for Cutaneous T-cell lymphoma.        Changes or new medications since last treatment:   NO    New medical conditions or problems since last treatment:   NO    Any problems with last phototherapy treatment?    NO    2. The patient tolerated phototherapy without complication    Patient will return for next UVA1 treatment, per protocol.     Patient to see provider every 4-12 weeks for follow-up during treatment.      All questions and concerns discussed with patient in clinic today.      Anu Davidson

## 2017-03-31 NOTE — MR AVS SNAPSHOT
After Visit Summary   3/31/2017    Lyndsey Hagen    MRN: [id number]           Patient Information     Date Of Birth          1950        Visit Information        Provider Department      3/31/2017 7:30 AM UC DERM LIGHT TREATMENT [facility] Dermatology        Today's Diagnoses     Cutaneous T-cell lymphoma (H)           Follow-ups after your visit        Your next 10 appointments already scheduled     Apr 03, 2017  8:00 AM CDT   (Arrive by 7:45 AM)   Derm Light Extended with UC DERM LIGHT TREATMENT   [facility] Dermatology ([facility])    [address] [place] [address]   [phone]            Apr 19, 2017  7:30 AM CDT   (Arrive by 7:15 AM)   Derm Light Extended with UC DERM LIGHT TREATMENT   [facility] ([facility])    [address] [place] [address]   [phone]            Apr 24, 2017  7:30 AM CDT   (Arrive by 7:15 AM)   Derm Light Extended with UC DERM LIGHT TREATMENT   [facility] Dermatology ([facility])    [address] [place] [address]   [phone]            Apr 26, 2017 11:30 AM CDT   (Arrive by 11:15 AM)   Return T-Cell Visit with Arlen Guillory MD   [facility])    [address] [place] [address]   [phone]            Apr 26, 2017 12:00 PM CDT   (Arrive by 11:45 AM)   Derm Light Extended with UC DERM LIGHT TREATMENT   [facility])    [address] [place] [address]   [phone]              Who to contact     Please call your clinic at [phone] to:    Ask questions about your health    Make or cancel appointments    Discuss your medicines    Learn about your test results    Speak to your doctor   If you have compliments or concerns about an experience at your clinic,  or if you wish to file a complaint, please contact [facility] Patient Relations at [phone] or email us at [email or web address]         Additional Information About Your Visit        [employer] Information     "[employer] gives you secure access to your electronic health record. If you see a primary care provider, you can also send messages to your care team and make appointments. If you have questions, please call your primary care clinic.  If you do not have a primary care provider, please call [phone] and they will assist you.      "[employer] is an electronic gateway that provides easy, online access to your medical records. With "[employer] you can request a clinic appointment, read your test results, renew a prescription or communicate with your care team.     To access your existing account, please contact your [facility] Clinic or call [phone] for assistance.        Care EveryWhere ID     This is your Care EveryWhere ID. This could be used by other organizations to access your [place] medical records  [id number]         Blood Pressure from Last 3 Encounters:   02/20/17 118/75   11/21/16 120/75   10/11/16 106/68    Weight from Last 3 Encounters:   05/23/16 73.5 kg (162 lb)   04/25/16 72.6 kg (160 lb)   02/22/16 76.1 kg (167 lb 12.8 oz)              We Performed the Following     CHARGE - PHOTOTHERAPY - UVA1     PROCEDURE - PHOTOTHERAPY UVA1        Primary Care Provider Office Phone # Fax #    April Rosalva [phone]       [facility] [address] [facility] [address]        Thank you!     Thank you for choosing [facility] DERMATOLOGY  for your care. Our goal is always to provide you with excellent care. Hearing back from our patients is one way we can continue to improve our services. Please take a few minutes to complete the written survey that you may receive in the mail after your visit with us. Thank you!              Your Updated Medication List - Protect others around you: Learn how to safely use, store and throw away your medicines at www.disposemymeds.org.          This list is accurate as of: 3/31/17  8:15 AM.  Always use your most recent med list.                   Brand Name Dispense Instructions for use    Bexarotene 1 % Gel    TARGRETIN    1 Tube    Use every other night to nightly       CO Q-10 PO      Take 1 tablet by mouth daily.       KRILL OIL PO      Take  by mouth daily.       MULTIVITAMINS PO      Take  by mouth daily.       SOLUBLE FIBER/PROBIOTICS PO      Take 2 tablets by mouth daily.       triamcinolone 0.1 % cream    KENALOG    454 g    Use in morning to rash       VITAMIN D-3 PO      Take 1 tablet by mouth daily.

## 2017-04-03 ENCOUNTER — OFFICE VISIT (OUTPATIENT)
Dept: DERMATOLOGY | Facility: CLINIC | Age: 67
End: 2017-04-03

## 2017-04-03 DIAGNOSIS — C84.A0 CUTANEOUS T-CELL LYMPHOMA (H): ICD-10-CM

## 2017-04-03 NOTE — PROGRESS NOTES
[facility] Dermatology Phototherapy Record  1. Lyndsey Hagen is a 66 year old female is here today for phototherapy (UVA1) treatment for Cutaneous T-cell lymphoma.        Changes or new medications since last treatment:   NO    New medical conditions or problems since last treatment:   NO    Any problems with last phototherapy treatment?    NO    2. The patient tolerated phototherapy without complication    Patient will return for next UVA1 treatment, per protocol.     Patient to see provider every 4-12 weeks for follow-up during treatment.      All questions and concerns discussed with patient in clinic today.      Stephany Mercer

## 2017-04-03 NOTE — MR AVS SNAPSHOT
After Visit Summary   4/3/2017    Lyndsey Hagen    MRN: [id number]           Patient Information     Date Of Birth          1950        Visit Information        Provider Department      4/3/2017 8:00 AM UC DERM LIGHT TREATMENT [facility] Dermatology        Today's Diagnoses     Cutaneous T-cell lymphoma (H)           Follow-ups after your visit        Your next 10 appointments already scheduled     Apr 19, 2017  7:30 AM CDT   (Arrive by 7:15 AM)   Derm Light Extended with UC DERM LIGHT TREATMENT   [facility] Dermatology [facility])    [address] [place] [address]   [phone]            Apr 24, 2017  7:30 AM CDT   (Arrive by 7:15 AM)   Derm Light Extended with UC DERM LIGHT TREATMENT   [facility])    [address] [place] [address] [phone]            Apr 26, 2017 11:30 AM CDT   (Arrive by 11:15 AM)   Return T-Cell Visit with Arlen Guillory MD   [facility])    [address] [place] [address] [phone]            Apr 26, 2017 12:00 PM CDT   (Arrive by 11:45 AM)   Derm Light Extended with UC DERM LIGHT TREATMENT   [facility])    [address] [place] [address] [phone]              Who to contact     Please call your clinic at [phone] to:    Ask questions about your health    Make or cancel appointments    Discuss your medicines    Learn about your test results    Speak to your doctor   If you have compliments or concerns about an experience at your clinic, or if you wish to file a complaint, please contact [facility] Patient Relations at [phone] or email us at [email or web address]         Additional Information About Your Visit        MyChart Information      [employer] gives you secure access to your electronic health record. If you see a primary care provider, you can also send messages to your care team and make appointments. If you have questions, please call your primary care clinic.  If you do not have a primary care provider, please call [phone] and they will assist you.      [employer] is an electronic gateway that provides easy, online access to your medical records. With [employer], you can request a clinic appointment, read your test results, renew a prescription or communicate with your care team.     To access your existing account, please contact your [facility] Physicians Clinic or call [phone] for assistance.        Care EveryWhere ID     This is your Care EveryWhere ID. This could be used by other organizations to access your [place] medical records  [id number]         Blood Pressure from Last 3 Encounters:   02/20/17 118/75   11/21/16 120/75   10/11/16 106/68    Weight from Last 3 Encounters:   05/23/16 73.5 kg (162 lb)   04/25/16 72.6 kg (160 lb)   02/22/16 76.1 kg (167 lb 12.8 oz)              We Performed the Following     CHARGE - PHOTOTHERAPY - UVA1     PROCEDURE - PHOTOTHERAPY UVA1        Primary Care Provider Office Phone # Fax #    April Rosalva [phone]       [facility] [address] [place] [address]        Thank you!     Thank you for choosing [facility] DERMATOLOGY  for your care. Our goal is always to provide you with excellent care. Hearing back from our patients is one way we can continue to improve our services. Please take a few minutes to complete the written survey that you may receive in the mail after your visit with us. Thank you!             Your Updated Medication List - Protect others around you: Learn how to safely use, store and throw away your medicines at www.disposemymeds.org.          This list is accurate as of: 4/3/17  8:35 AM.  Always use your most recent med list.                    Brand Name Dispense Instructions for use    Bexarotene 1 % Gel    TARGRETIN    1 Tube    Use every other night to nightly       CO Q-10 PO      Take 1 tablet by mouth daily.       KRILL OIL PO      Take  by mouth daily.       MULTIVITAMINS PO      Take  by mouth daily.       SOLUBLE FIBER/PROBIOTICS PO      Take 2 tablets by mouth daily.       triamcinolone 0.1 % cream    KENALOG    454 g    Use in morning to rash       VITAMIN D-3 PO      Take 1 tablet by mouth daily.

## 2017-04-19 ENCOUNTER — OFFICE VISIT (OUTPATIENT)
Dept: DERMATOLOGY | Facility: CLINIC | Age: 67
End: 2017-04-19

## 2017-04-19 DIAGNOSIS — C84.A0 CUTANEOUS T-CELL LYMPHOMA (H): ICD-10-CM

## 2017-04-19 NOTE — PROGRESS NOTES
[facility] Dermatology Phototherapy Record  1. Lyndsey Hagen is a 66 year old female is here today for phototherapy (UVB) treatment for Cutaneous T-cell lymphoma.        Changes or new medications since last treatment:   NO    New medical conditions or problems since last treatment:   NO    Any problems with last phototherapy treatment?    NO    2. The patient tolerated phototherapy without complication    Patient will return on for next UVB treatment, per protocol.     Patient to see provider every 4-12 weeks for follow-up during treatment.      All questions and concerns discussed with patient in clinic today.      Anu Davidson

## 2017-04-19 NOTE — MR AVS SNAPSHOT
After Visit Summary   4/19/2017    Lyndsey Hagen    MRN: [id number]           Patient Information     Date Of Birth          1950        Visit Information        Provider Department      4/19/2017 7:30 AM UC DERM LIGHT TREATMENT [facility] Dermatology        Today's Diagnoses     Cutaneous T-cell lymphoma (H)           Follow-ups after your visit        Your next 10 appointments already scheduled     Apr 24, 2017  7:30 AM CDT   (Arrive by 7:15 AM)   Derm Light Extended with UC DERM LIGHT TREATMENT   [facility] Dermatology [facility])    [address] [place] [address] [phone]            Apr 26, 2017 11:30 AM CDT   (Arrive by 11:15 AM)   Return T-Cell Visit with Arlen Guillory MD   [facility])    [address] [place] [address] [phone]            Apr 26, 2017 12:00 PM CDT   (Arrive by 11:45 AM)   Derm Light Extended with UC DERM LIGHT TREATMENT   [facility])    [address] [place] [address] [phone]              Who to contact     Please call your clinic at [phone] to:    Ask questions about your health    Make or cancel appointments    Discuss your medicines    Learn about your test results    Speak to your doctor   If you have compliments or concerns about an experience at your clinic, or if you wish to file a complaint, please contact [facility] Patient Relations at [phone] or email us at [email or web address]         Additional Information About Your Visit        MyChart Information     [employer] gives you secure access to your electronic health record. If you see a primary care provider, you can also send messages to your care team and make appointments. If you have questions, please call your primary care clinic.  If you do not  have a primary care provider, please call [phone] and they will assist you.      [employer] is an electronic gateway that provides easy, online access to your medical records. With [employer], you can request a clinic appointment, read your test results, renew a prescription or communicate with your care team.     To access your existing account, please contact your [facility] Phys[facility] Clinic or call [phone] for assistance.        Care EveryWhere ID     This is your Care EveryWhere ID. This could be used by other organizations to access your [place] medical records  [id number]         Blood Pressure from Last 3 Encounters:   02/20/17 118/75   11/21/16 120/75   10/11/16 106/68    Weight from Last 3 Encounters:   05/23/16 73.5 kg (162 lb)   04/25/16 72.6 kg (160 lb)   02/22/16 76.1 kg (167 lb 12.8 oz)              We Performed the Following     CHARGE - PHOTOTHERAPY - UVA1     PROCEDURE - PHOTOTHERAPY UVA1        Primary Care Provider Office Phone # Fax #    April Rosalva [phone]       [facility] [address] [place] [address]        Thank you!     Thank you for choosing [facility] DERMATOLOGY  for your care. Our goal is always to provide you with excellent care. Hearing back from our patients is one way we can continue to improve our services. Please take a few minutes to complete the written survey that you may receive in the mail after your visit with us. Thank you!             Your Updated Medication List - Protect others around you: Learn how to safely use, store and throw away your medicines at www.disposemymeds.org.          This list is accurate as of: 4/19/17  8:41 AM.  Always use your most recent med list.                   Brand Name Dispense Instructions for use    Bexarotene 1 % Gel    TARGRETIN    1 Tube    Use every other night to nightly       CO Q-10 PO      Take 1 tablet by mouth daily.       KRILL OIL PO      Take  by mouth daily.        MULTIVITAMINS PO      Take  by mouth daily.       SOLUBLE FIBER/PROBIOTICS PO      Take 2 tablets by mouth daily.       triamcinolone 0.1 % cream    KENALOG    454 g    Use in morning to rash       VITAMIN D-3 PO      Take 1 tablet by mouth daily.

## 2017-04-24 ENCOUNTER — OFFICE VISIT (OUTPATIENT)
Dept: DERMATOLOGY | Facility: CLINIC | Age: 67
End: 2017-04-24

## 2017-04-24 DIAGNOSIS — C84.A0 CUTANEOUS T-CELL LYMPHOMA (H): ICD-10-CM

## 2017-04-24 NOTE — PROGRESS NOTES
[facility] Dermatology Phototherapy Record  1. Lyndsey Hagen is a 66 year old female is here today for phototherapy (UVA1) treatment for Cutaneous T-cell lymphoma.        Changes or new medications since last treatment:   NO    New medical conditions or problems since last treatment:   NO    Any problems with last phototherapy treatment?    NO    2. The patient tolerated phototherapy without complication    Patient will return for next UVA1 treatment, per protocol.     Patient to see provider every 4-12 weeks for follow-up during treatment.      All questions and concerns discussed with patient in clinic today.      Anu Davidson

## 2017-04-24 NOTE — MR AVS SNAPSHOT
After Visit Summary   4/24/2017    Lyndsey Hagen    MRN: [id number]           Patient Information     Date Of Birth          1950        Visit Information        Provider Department      4/24/2017 7:30 AM UC DERM LIGHT TREATMENT [facility]        Today's Diagnoses     Cutaneous T-cell lymphoma (H)           Follow-ups after your visit        Your next 10 appointments already scheduled     Apr 26, 2017 11:30 AM CDT   (Arrive by 11:15 AM)   Return T-Cell Visit with Arlen Guillory MD   [facility] ([facility])    [address] [place] [address] [phone]            Apr 26, 2017 12:00 PM CDT   (Arrive by 11:45 AM)   Derm Light Extended with UC DERM LIGHT TREATMENT   [facility] ([facility])    [address] [place] [address] [phone]              Who to contact     Please call your clinic at [phone] to:    Ask questions about your health    Make or cancel appointments    Discuss your medicines    Learn about your test results    Speak to your doctor   If you have compliments or concerns about an experience at your clinic, or if you wish to file a complaint, please contact [facility] Patient Relations at [phone] or email us at [email or web address]         Additional Information About Your Visit        MyChart Information     [employer]-Tect gives you secure access to your electronic health record. If you see a primary care provider, you can also send messages to your care team and make appointments. If you have questions, please call your primary care clinic.  If you do not have a primary care provider, please call [phone] and they will assist you.      EPIS is an electronic gateway that provides easy, online access to your medical records. With EPIS, you can request a clinic appointment, read your test  results, renew a prescription or communicate with your care team.     To access your existing account, please contact your [facility] or call [phone] for assistance.        Care EveryWhere ID     This is your Care EveryWhere ID. This could be used by other organizations to access your [place] medical records  [id number]         Blood Pressure from Last 3 Encounters:   02/20/17 118/75   11/21/16 120/75   10/11/16 106/68    Weight from Last 3 Encounters:   05/23/16 73.5 kg (162 lb)   04/25/16 72.6 kg (160 lb)   02/22/16 76.1 kg (167 lb 12.8 oz)              We Performed the Following     PROCEDURE - PHOTOTHERAPY UVA1        Primary Care Provider Office Phone # Fax #    April Rosalva [phone]       [facility] [address] [place] [address]        Thank you!     Thank you for choosing [facility]  for your care. Our goal is always to provide you with excellent care. Hearing back from our patients is one way we can continue to improve our services. Please take a few minutes to complete the written survey that you may receive in the mail after your visit with us. Thank you!             Your Updated Medication List - Protect others around you: Learn how to safely use, store and throw away your medicines at www.disposemymeds.org.          This list is accurate as of: 4/24/17  8:04 AM.  Always use your most recent med list.                   Brand Name Dispense Instructions for use    Bexarotene 1 % Gel    TARGRETIN    1 Tube    Use every other night to nightly       CO Q-10 PO      Take 1 tablet by mouth daily.       KRILL OIL PO      Take  by mouth daily.       MULTIVITAMINS PO      Take  by mouth daily.       SOLUBLE FIBER/PROBIOTICS PO      Take 2 tablets by mouth daily.       triamcinolone 0.1 % cream    KENALOG    454 g    Use in morning to rash       VITAMIN D-3 PO      Take 1 tablet by mouth daily.

## 2017-04-26 ENCOUNTER — OFFICE VISIT (OUTPATIENT)
Dept: DERMATOLOGY | Facility: CLINIC | Age: 67
End: 2017-04-26

## 2017-04-26 ENCOUNTER — RECORDS - HEALTHEAST (OUTPATIENT)
Dept: ADMINISTRATIVE | Facility: OTHER | Age: 67
End: 2017-04-26

## 2017-04-26 DIAGNOSIS — C84.00 MYCOSIS FUNGOIDES, UNSPECIFIED BODY REGION (H): Primary | ICD-10-CM

## 2017-04-26 DIAGNOSIS — C84.A0 CUTANEOUS T-CELL LYMPHOMA (H): ICD-10-CM

## 2017-04-26 ASSESSMENT — PAIN SCALES - GENERAL: PAINLEVEL: NO PAIN (0)

## 2017-04-26 NOTE — MR AVS SNAPSHOT
After Visit Summary   4/26/2017    Lyndsey Hagen    MRN: [id number]           Patient Information     Date Of Birth          1950        Visit Information        Provider Department      4/26/2017 12:00 PM UC DERM LIGHT TREATMENT [facility]        Today's Diagnoses     Cutaneous T-cell lymphoma (H)           Follow-ups after your visit        Who to contact     Please call your clinic at [phone] to:    Ask questions about your health    Make or cancel appointments    Discuss your medicines    Learn about your test results    Speak to your doctor   If you have compliments or concerns about an experience at your clinic, or if you wish to file a complaint, please contact [facility] Patient Relations at [phone] or email us at [email or web address]         Additional Information About Your Visit        MyChart Information     [employer] Logict gives you secure access to your electronic health record. If you see a primary care provider, you can also send messages to your care team and make appointments. If you have questions, please call your primary care clinic.  If you do not have a primary care provider, please call [phone] and they will assist you.      OneMln is an electronic gateway that provides easy, online access to your medical records. With OneMln, you can request a clinic appointment, read your test results, renew a prescription or communicate with your care team.     To access your existing account, please contact your [facility] or call [phone] for assistance.        Care EveryWhere ID     This is your Care EveryWhere ID. This could be used by other organizations to access your [place] medical records  [id number]         Blood Pressure from Last 3 Encounters:   02/20/17 118/75   11/21/16 120/75   10/11/16 106/68    Weight from Last 3 Encounters:   05/23/16 73.5 kg (162 lb)   04/25/16 72.6 kg (160 lb)    02/22/16 76.1 kg (167 lb 12.8 oz)              We Performed the Following     CHARGE - PHOTOTHERAPY - UVA1     PROCEDURE - PHOTOTHERAPY UVA1        Primary Care Provider Office Phone # Fax #    April Rosalva [phone]       [facility] [address] [facility] [address]        Thank you!     Thank you for choosing [facility]  for your care. Our goal is always to provide you with excellent care. Hearing back from our patients is one way we can continue to improve our services. Please take a few minutes to complete the written survey that you may receive in the mail after your visit with us. Thank you!             Your Updated Medication List - Protect others around you: Learn how to safely use, store and throw away your medicines at www.disposemymeds.org.          This list is accurate as of: 4/26/17 12:39 PM.  Always use your most recent med list.                   Brand Name Dispense Instructions for use    Bexarotene 1 % Gel    TARGRETIN    1 Tube    Use every other night to nightly       CO Q-10 PO      Take 1 tablet by mouth daily.       KRILL OIL PO      Take  by mouth daily.       MULTIVITAMINS PO      Take  by mouth daily.       SOLUBLE FIBER/PROBIOTICS PO      Take 2 tablets by mouth daily.       triamcinolone 0.1 % cream    KENALOG    454 g    Use in morning to rash       VITAMIN D-3 PO      Take 1 tablet by mouth daily.

## 2017-04-26 NOTE — PROGRESS NOTES
[facility] Phototherapy Record  1. Lyndsey Hagen is a 66 year old female is here today for phototherapy (UVB) treatment for Cutaneous T-cell lymphoma.        Changes or new medications since last treatment:   NO    New medical conditions or problems since last treatment:   NO    Any problems with last phototherapy treatment?    NO    2. The patient tolerated phototherapy without complication    Patient will return on for next UVB treatment, per protocol.     Patient to see provider every 4-12 weeks for follow-up during treatment.      All questions and concerns discussed with patient in clinic today.      Patrica Fuentes

## 2017-04-26 NOTE — PROGRESS NOTES
[facility] Dermatology Note      Dermatology Problem List:  1. CTCL, stage IIB  -Previously treated with PUVA with good response. Treated at [place] prior to establishing care here. Stopped due to difficulty percuring oxsoralen and risks associated with PUVA.  -Temporarily discontinuing UVA1 (4/26/17) due to insurance  -Targretin 1% gel (added on 5/23/16) with good response.   -Triamcinolone 0.1% cream as needed for irritation from targretin gel.    Encounter Date: Apr 26, 2017    CC:  Chief Complaint   Patient presents with     Derm Problem     Lyndsey is here today for a T cell skin check.  Has a area of concern on her shoulder.           History of Present Illness:  Ms. Lyndsey Hagen is a 66 year old female who presents as a follow-up for CTCL. The patient was last seen 2/20/17 when she was continued UVA1 and topical targretin to any thicker lesions. [place]  [facility] and did not noticed any worsening of her lesions without UVA1. Endorses she was getting some sun exposure but states she was very diligent with her sun protection including sunscreen and sun protective clothing. Recently changed insurance to a high deductible plan prior to medicare coverage in 4 months. Is wondering if she could decrease the frequency of her UVA1. No concerning lesions at present; no systemic symptoms noted.    Past Medical History:   Patient Active Problem List   Diagnosis     Cutaneous T-cell lymphoma (H)     Past Medical History:   Diagnosis Date     Cutaneous T-cell lymphoma (H)      Past Surgical History:   Procedure Laterality Date     NO HISTORY OF SURGERY  10/28/31    derm       Social History:  The patient is a retired in [employer].    Family History:  There is a family history of non-melanoma skin cancer in the patient's mother and sisters .    Medications:  Current Outpatient Prescriptions   Medication Sig Dispense Refill     triamcinolone (KENALOG) 0.1 % cream Use in  morning to rash 454 g 5     Bexarotene (TARGRETIN) 1 % GEL Use every other night to nightly 1 Tube 2     Multiple Vitamin (MULTIVITAMINS PO) Take  by mouth daily.       Probiotic Product (SOLUBLE FIBER/PROBIOTICS PO) Take 2 tablets by mouth daily.       KRILL OIL PO Take  by mouth daily.       Cholecalciferol (VITAMIN D-3 PO) Take 1 tablet by mouth daily.       Coenzyme Q10 (CO Q-10 PO) Take 1 tablet by mouth daily.       Allergies   Allergen Reactions     Nkda [No Known Drug Allergies]          Review of Systems:  -As per HPI  -Constitutional: The patient denies fatigue, fevers, chills, unintended weight loss, and night sweats.  -HEENT: Patient denies nonhealing oral sores.  -Skin: As above in HPI. No additional skin concerns.    Physical exam:  Vitals: There were no vitals taken for this visit.  GEN: This is a well developed, well-nourished female in no acute distress, in a pleasant mood.    SKIN: Full skin, which includes the head/face, both arms, chest, back, abdomen,both legs, genitalia and/or groin buttocks, digits and/or nails, was examined.  - scaterred hypopigmented firm plaques on trunk and extremities  - scattered pigmented macules without irregularity consistent with benign nevi  -No other lesions of concern on areas examined.   No cervical, submandibular, axillary or inguinal adenopathy  Impression/Plan:  1. CTCL; stage IIB: no new or evolving lesions at present. Patient feels she is stable and well-controlled with current regiment of UVA1 and targretin with triamcinolone for irritation. Would like to decrease frequency of phototherapy as she has recently changed insurance.    Will discontinue UVA1 following this week at present with close at home observation (may consider tanning beds if needed)    Continue targretin 1% gel as needed to remaining lesions     Triamcinolone 0.1% cream as needed for irritation from targretin gel    Continue daily whole body moisturizing         Follow-up in 4 months,  earlier for new or changing lesions.     Staff Involved:  Scribed by Carroll Alicia, MS3 for Dr. Guillory.    .The medical student acted as scribe for this encounter.  The encounter documented above was completely performed by myself.  Arlen Guillory MD

## 2017-04-26 NOTE — LETTER
4/26/2017     RE: Lyndsey Hagen  1938 [address] [address]     Dear Colleague,    Thank you for referring your patient, Lyndsey Hagen, to the [facility] DERMATOLOGY at [facility]. Please see a copy of my visit note below.    [facility] Dermatology Note      Dermatology Problem List:  1. CTCL, stage IIB  -Previously treated with PUVA with good response. Treated at [place] prior to establishing care here. Stopped due to difficulty percuring oxsoralen and risks associated with PUVA.  -Temporarily discontinuing UVA1 (4/26/17) due to insurance  -Targretin 1% gel (added on 5/23/16) with good response.   -Triamcinolone 0.1% cream as needed for irritation from targretin gel.    Encounter Date: Apr 26, 2017    CC:  Chief Complaint   Patient presents with     Derm Problem     Lyndsey is here today for a T cell skin check.  Has a area of concern on her shoulder.           History of Present Illness:  Ms. Lyndsey Hagen is a 66 year old female who presents as a follow-up for CTCL. The patient was last seen 2/20/17 when she was continued UVA1 and topical targretin to any thicker lesions. Recently [place]  [facility] and did not noticed any worsening of her lesions without UVA1. Endorses she was getting some sun exposure but states she was very diligent with her sun protection including sunscreen and sun protective clothing. Recently changed insurance to a high deductible plan prior to medicare coverage in 4 months. Is wondering if she could decrease the frequency of her UVA1. No concerning lesions at present; no systemic symptoms noted.    Past Medical History:   Patient Active Problem List   Diagnosis     Cutaneous T-cell lymphoma (H)     Past Medical History:   Diagnosis Date     Cutaneous T-cell lymphoma (H)      Past Surgical History:   Procedure Laterality Date     NO HISTORY OF SURGERY  10/28/31    derm       Social History:  The patient is a  retired in [employer].    Family History:  There is a family history of non-melanoma skin cancer in the patient's mother and sisters .    Medications:  Current Outpatient Prescriptions   Medication Sig Dispense Refill     triamcinolone (KENALOG) 0.1 % cream Use in morning to rash 454 g 5     Bexarotene (TARGRETIN) 1 % GEL Use every other night to nightly 1 Tube 2     Multiple Vitamin (MULTIVITAMINS PO) Take  by mouth daily.       Probiotic Product (SOLUBLE FIBER/PROBIOTICS PO) Take 2 tablets by mouth daily.       KRILL OIL PO Take  by mouth daily.       Cholecalciferol (VITAMIN D-3 PO) Take 1 tablet by mouth daily.       Coenzyme Q10 (CO Q-10 PO) Take 1 tablet by mouth daily.       Allergies   Allergen Reactions     Nkda [No Known Drug Allergies]          Review of Systems:  -As per HPI  -Constitutional: The patient denies fatigue, fevers, chills, unintended weight loss, and night sweats.  -HEENT: Patient denies nonhealing oral sores.  -Skin: As above in HPI. No additional skin concerns.    Physical exam:  Vitals: There were no vitals taken for this visit.  GEN: This is a well developed, well-nourished female in no acute distress, in a pleasant mood.    SKIN: Full skin, which includes the head/face, both arms, chest, back, abdomen,both legs, genitalia and/or groin buttocks, digits and/or nails, was examined.  - scaterred hypopigmented firm plaques on trunk and extremities  - scattered pigmented macules without irregularity consistent with benign nevi  -No other lesions of concern on areas examined.   No cervical, submandibular, axillary or inguinal adenopathy  Impression/Plan:  1. CTCL; stage IIB: no new or evolving lesions at present. Patient feels she is stable and well-controlled with current regiment of UVA1 and targretin with triamcinolone for irritation. Would like to decrease frequency of phototherapy as she has recently changed insurance.    Will discontinue UVA1 following this week at present with close at  home observation (may consider tanning beds if needed)    Continue targretin 1% gel as needed to remaining lesions     Triamcinolone 0.1% cream as needed for irritation from targretin gel    Continue daily whole body moisturizing     Follow-up in 4 months, earlier for new or changing lesions.     Staff Involved:  Scribed by Carroll Alicia, MS3 for Dr. Guillory.    .The medical student acted as scribe for this encounter.  The encounter documented above was completely performed by myself.  Arlen Guillory MD    Again, thank you for allowing me to participate in the care of your patient.      Sincerely,    Arlen Guillory MD

## 2017-04-26 NOTE — MR AVS SNAPSHOT
After Visit Summary   4/26/2017    Lyndsey Hagen    MRN: [id number]           Patient Information     Date Of Birth          1950        Visit Information        Provider Department      4/26/2017 11:30 AM Arlen Guillory MD [facility]        Today's Diagnoses     Mycosis fungoides, unspecified body region (H)    -  1       Follow-ups after your visit        Follow-up notes from your care team     Return in about 4 months (around 8/26/2017).      Your next 10 appointments already scheduled     Apr 28, 2017  1:30 PM CDT   (Arrive by 1:15 PM)   Derm Light Extended with UC DERM LIGHT TREATMENT   [facility] ([facility])    [address] [address] [phone]              Who to contact     Please call your clinic at [phone] to:    Ask questions about your health    Make or cancel appointments    Discuss your medicines    Learn about your test results    Speak to your doctor   If you have compliments or concerns about an experience at your clinic, or if you wish to file a complaint, please contact [facility] Patient Relations at [phone] or email us at [email or web address]         Additional Information About Your Visit        MyCharGlobel Direct Information     Bleacher Report gives you secure access to your electronic health record. If you see a primary care provider, you can also send messages to your care team and make appointments. If you have questions, please call your primary care clinic.  If you do not have a primary care provider, please call [phone] and they will assist you.      Bleacher Report is an electronic gateway that provides easy, online access to your medical records. With Bleacher Report, you can request a clinic appointment, read your test results, renew a prescription or communicate with your care team.     To access your existing account, please contact your [facility]  [place] [facility] or call [phone] for assistance.        Care EveryWhere ID     This is your Care EveryWhere ID. This could be used by other organizations to access your [place] medical records  [id number]         Blood Pressure from Last 3 Encounters:   02/20/17 118/75   11/21/16 120/75   10/11/16 106/68    Weight from Last 3 Encounters:   05/23/16 73.5 kg (162 lb)   04/25/16 72.6 kg (160 lb)   02/22/16 76.1 kg (167 lb 12.8 oz)              Today, you had the following     No orders found for display       Primary Care Provider Office Phone # Fax #    April Rosalva [phone]       [address] [address]        Thank you!     Thank you for choosing [facility] DERMATOLOG[facility]  for your care. Our goal is always to provide you with excellent care. Hearing back from our patients is one way we can continue to improve our services. Please take a few minutes to complete the written survey that you may receive in the mail after your visit with us. Thank you!             Your Updated Medication List - Protect others around you: Learn how to safely use, store and throw away your medicines at www.disposemymeds.org.          This list is accurate as of: 4/26/17 11:59 PM.  Always use your most recent med list.                   Brand Name Dispense Instructions for use    Bexarotene 1 % Gel    TARGRETIN    1 Tube    Use every other night to nightly       CO Q-10 PO      Take 1 tablet by mouth daily.       KRILL OIL PO      Take  by mouth daily.       MULTIVITAMINS PO      Take  by mouth daily.       SOLUBLE FIBER/PROBIOTICS PO      Take 2 tablets by mouth daily.       triamcinolone 0.1 % cream    KENALOG    454 g    Use in morning to rash       VITAMIN D-3 PO      Take 1 tablet by mouth daily.

## 2017-04-26 NOTE — NURSING NOTE
Dermatology Rooming Note    Lyndsey Hagen's goals for this visit include:   Chief Complaint   Patient presents with     Derm Problem     Lyndsey is here today for a T cell skin check.  Has a area of concern on her shoulder.         Rhonda Cerrato LPN

## 2017-04-28 ENCOUNTER — OFFICE VISIT (OUTPATIENT)
Dept: DERMATOLOGY | Facility: CLINIC | Age: 67
End: 2017-04-28

## 2017-04-28 DIAGNOSIS — C84.A0 CUTANEOUS T-CELL LYMPHOMA (H): ICD-10-CM

## 2017-04-28 NOTE — MR AVS SNAPSHOT
After Visit Summary   4/28/2017    Lyndsey Hagen    MRN: [id number]           Patient Information     Date Of Birth          1950        Visit Information        Provider Department      4/28/2017 1:30 PM [facility] DERM LIGHT TREATMENT [facility] Dermatology        Today's Diagnoses     Cutaneous T-cell lymphoma (H)           Follow-ups after your visit        Who to contact     Please call your clinic at [phone] to:    Ask questions about your health    Make or cancel appointments    Discuss your medicines    Learn about your test results    Speak to your doctor   If you have compliments or concerns about an experience at your clinic, or if you wish to file a complaint, please contact [facility] Patient Relations at [phone] or email us at [email or web address]         Additional Information About Your Visit        MyChart Information     ERNt gives you secure access to your electronic health record. If you see a primary care provider, you can also send messages to your care team and make appointments. If you have questions, please call your primary care clinic.  If you do not have a primary care provider, please call [phone] and they will assist you.      Hyperformix is an electronic gateway that provides easy, online access to your medical records. With Hyperformix, you can request a clinic appointment, read your test results, renew a prescription or communicate with your care team.     To access your existing account, please contact your [facility] or call [phone] for assistance.        Care EveryWhere ID     This is your Care EveryWhere ID. This could be used by other organizations to access your [place] medical records  [id number]         Blood Pressure from Last 3 Encounters:   02/20/17 118/75   11/21/16 120/75   10/11/16 106/68    Weight from Last 3 Encounters:   05/23/16 73.5 kg (162 lb)   04/25/16 72.6 kg (160 lb)    02/22/16 76.1 kg (167 lb 12.8 oz)              We Performed the Following     CHARGE - PHOTOTHERAPY - UVA1     PROCEDURE - PHOTOTHERAPY UVA1        Primary Care Provider Office Phone # Fax #    April Rosalva [phone]       [facility] [address] [address]        Thank you!     Thank you for choosing [facility] DERMATOLOGY  for your care. Our goal is always to provide you with excellent care. Hearing back from our patients is one way we can continue to improve our services. Please take a few minutes to complete the written survey that you may receive in the mail after your visit with us. Thank you!             Your Updated Medication List - Protect others around you: Learn how to safely use, store and throw away your medicines at www.disposemymeds.org.          This list is accurate as of: 4/28/17  2:11 PM.  Always use your most recent med list.                   Brand Name Dispense Instructions for use    Bexarotene 1 % Gel    TARGRETIN    1 Tube    Use every other night to nightly       CO Q-10 PO      Take 1 tablet by mouth daily.       KRILL OIL PO      Take  by mouth daily.       MULTIVITAMINS PO      Take  by mouth daily.       SOLUBLE FIBER/PROBIOTICS PO      Take 2 tablets by mouth daily.       triamcinolone 0.1 % cream    KENALOG    454 g    Use in morning to rash       VITAMIN D-3 PO      Take 1 tablet by mouth daily.

## 2017-04-28 NOTE — PROGRESS NOTES
[facility] Dermatology Phototherapy Record  1. Lyndsey Hagen is a 66 year old female is here today for phototherapy (UVA1) treatment for Cutaneous T-cell lymphoma.        Changes or new medications since last treatment:   NO    New medical conditions or problems since last treatment:   NO    Any problems with last phototherapy treatment?    NO    2. The patient tolerated phototherapy without complication    Patient will return for next UVA treatment, per protocol.     Patient to see provider every 4-12 weeks for follow-up during treatment.      All questions and concerns discussed with patient in clinic today.      Pooja Jordan RN

## 2017-05-08 DIAGNOSIS — C84.A0 CTCL (CUTANEOUS T-CELL LYMPHOMA) (H): ICD-10-CM

## 2017-05-08 RX ORDER — BEXAROTENE 1 G/100G
GEL TOPICAL
Qty: 60 G | Refills: 5 | Status: SHIPPED | OUTPATIENT
Start: 2017-05-08 | End: 2018-11-26

## 2017-05-08 NOTE — TELEPHONE ENCOUNTER
Last Visit:04/26/17  Next Visit: none    1. CTCL; stage IIB: no new or evolving lesions at present. Patient feels she is stable and well-controlled with current regiment of UVA1 and targretin with triamcinolone for irritation. Would like to decrease frequency of phototherapy as she has recently changed insurance.    Will discontinue UVA1 following this week at present with close at home observation (may consider tanning beds if needed)    Continue targretin 1% gel as needed to remaining lesions     Triamcinolone 0.1% cream as needed for irritation from targretin gel    Continue daily whole body moisturizing            Follow-up in 4 months, earlier for new or changing lesions.

## 2017-05-08 NOTE — TELEPHONE ENCOUNTER
Received refill request for bexarotene 1% gel. Reviewed the chart (including last visit note from 4/26/17) and approved.

## 2017-09-19 ENCOUNTER — RECORDS - HEALTHEAST (OUTPATIENT)
Dept: ADMINISTRATIVE | Facility: OTHER | Age: 67
End: 2017-09-19

## 2017-09-19 ENCOUNTER — OFFICE VISIT (OUTPATIENT)
Dept: DERMATOLOGY | Facility: CLINIC | Age: 67
End: 2017-09-19

## 2017-09-19 VITALS — HEART RATE: 74 BPM | DIASTOLIC BLOOD PRESSURE: 73 MMHG | SYSTOLIC BLOOD PRESSURE: 109 MMHG

## 2017-09-19 DIAGNOSIS — C84.00 MYCOSIS FUNGOIDES, UNSPECIFIED BODY REGION (H): Primary | ICD-10-CM

## 2017-09-19 ASSESSMENT — PAIN SCALES - GENERAL: PAINLEVEL: NO PAIN (0)

## 2017-09-19 NOTE — MR AVS SNAPSHOT
After Visit Summary   9/19/2017    Lyndsey Hagen    MRN: [id number]           Patient Information     Date Of Birth          1950        Visit Information        Provider Department      9/19/2017 11:30 AM Arlen Guillory MD [facility] Dermatology        Today's Diagnoses     Mycosis fungoides, unspecified body region (H)    -  1      Care Instructions    Patient Instructions for Narrow Band Ultraviolet B (NBUVB) Therapy    I will have pinkness or redness and my skin will be tan. I may have persistent redness or itching. Risks of the procedure include burn, pain, scar, skin discoloration, itching, eye damage, worsening of skin condition, skin aging and skin cancer. Multiple treatments may be required.     1. If your skin lesions are very scaly, you will be asked to apply mineral oil to the involved skin before treatment. This increases the penetration of the light.    2. Before your light treatment you will apply sunscreen to your lips to protect them from unnecessary ultraviolet light exposure.    3. You may also be asked to apply sunscreen to your face prior to light therapy if it is not affected by the skin condition. This is to protect your face from unnecessary ultraviolet light exposure.    4. All patients will wear ultraviolet light-blocking eye protection in the mccall. Close your eyes behind the goggles.     5. Female patients will apply sunscreen to the nipples and surrounding area. Male patients will cover the genitals with an opaque sock or supporter.     6. If you have orders to cover additional parts of your body during the treatment, by using an undergarment, shirt, etc., always use this same item every time to prevent burning.      7. You should not sunbathe or go to tanning booths during the time you are receiving light therapy. When you are outdoors you should apply a sunscreen to all exposed areas. If you get sunburned you will not be able to receive light  "treatments.    8. Light therapy has a drying effect on the skin, so it is important to apply a moisturizing cream or ointment frequently.    9. You may want to try and schedule a light appointment on the same day as your follow-up clinic visits.    10. Report any redness or burning that lasts longer than 24 hours, any increase in itching, or changes in skin condition. You should feel like your skin has a \"sun kissed\" feeling.    11. Inform your phototherapy nurse or your Dermatology doctor, before having any more light treatments, if (a) you start taking any new medication or (b) you develop any new health problems.     12. Please do not bring children to your phototherapy appointments. If children must come with you, please bring a responsible adult caregiver to supervise them.    13. IF YOU ARE HAVING TREATMENT AT THE [place], you must check with your insurance company to make sure phototherapy is covered before you can start treatment.    Procedure codes to tell your insurance company are:     UVB- 66637 if you are using petroleum or sunscreen during the treatment    [id number] if you are not going petroleum or sunscreen.     [id number]       Who should I call with questions?    [facility]: [phone]     [facility]: [phone]    For urgent needs outside of business hours call the [facility] at [phone] and ask for the dermatology resident on call                Follow-ups after your visit        Future tests that were ordered for you today     Open Standing Orders        Priority Remaining Interval Expires Ordered    PROCEDURE - PHOTOTHERAPY UVA1 Routine 100/100  9/19/2018 9/19/2017            Who to contact     Please call your clinic at [phone] to:    Ask questions about your health    Make or cancel appointments    Discuss your medicines    Learn about your test results    Speak to your doctor   If you have compliments or " concerns about an experience at your clinic, or if you wish to file a complaint, please contact [facility] Patient Relations at [phone] or email us at [email or web address]         Additional Information About Your Visit        Frodiohart Information     Kliqedt gives you secure access to your electronic health record. If you see a primary care provider, you can also send messages to your care team and make appointments. If you have questions, please call your primary care clinic.  If you do not have a primary care provider, please call [phone] and they will assist you.      Qiro is an electronic gateway that provides easy, online access to your medical records. With Qiro, you can request a clinic appointment, read your test results, renew a prescription or communicate with your care team.     To access your existing account, please contact your [facility] or call [phone] for assistance.        Care EveryWhere ID     This is your Care EveryWhere ID. This could be used by other organizations to access your [place] medical records  [id number]        Your Vitals Were     Pulse                   74            Blood Pressure from Last 3 Encounters:   09/19/17 109/73   02/20/17 118/75   11/21/16 120/75    Weight from Last 3 Encounters:   05/23/16 73.5 kg (162 lb)   04/25/16 72.6 kg (160 lb)   02/22/16 76.1 kg (167 lb 12.8 oz)               Primary Care Provider Office Phone # Fax #    April Rosalva [phone]       [address] [address]        Equal Access to Services     KARTHIK PAULA : Hadii aad ku hadasho Soomaali, waaxda luqadaha, qaybta kaalmada manuel ozuna . So [facility] [phone].    ATENCIÓN: Si habla español, tiene a bustillos disposición servicios gratuitos de asistencia lingüística. Llame al [phone].    We comply with applicable federal  civil rights laws and [place] laws. We do not discriminate on the basis of race, color, national origin, age, disability sex, sexual orientation or gender identity.            Thank you!     Thank you for choosing [facility] DERMATOLOGY  for your care. Our goal is always to provide you with excellent care. Hearing back from our patients is one way we can continue to improve our services. Please take a few minutes to complete the written survey that you may receive in the mail after your visit with us. Thank you!             Your Updated Medication List - Protect others around you: Learn how to safely use, store and throw away your medicines at www.disposemymeds.org.          This list is accurate as of: 9/19/17 12:19 PM.  Always use your most recent med list.                   Brand Name Dispense Instructions for use Diagnosis    CO Q-10 PO      Take 1 tablet by mouth daily.        KRILL OIL PO      Take  by mouth daily.        MULTIVITAMINS PO      Take  by mouth daily.        SOLUBLE FIBER/PROBIOTICS PO      Take 2 tablets by mouth daily.        TARGRETIN 1 % Gel   Generic drug:  Bexarotene     60 g    APPLY EVERY OTHER NIGHT TO EVERY NIGHT AS DIRECTED    CTCL (cutaneous T-cell lymphoma) (H)       triamcinolone 0.1 % cream    KENALOG    454 g    Use in morning to rash    CTCL (cutaneous T-cell lymphoma) (H)       VITAMIN D-3 PO      Take 1 tablet by mouth daily.

## 2017-09-19 NOTE — PROGRESS NOTES
[facility] Dermatology Note      Dermatology Problem List:  1. CTCL, stage IIB  -Previously treated with PUVA with good response. Treated at [place] prior to establishing care here. Stopped due to difficulty percuring oxsoralen and risks associated with PUVA.  -Temporarily discontinuing UVA1 (4/26/17) due to insurance  -Targretin 1% gel (added on 5/23/16) with good response.   -Triamcinolone 0.1% cream as needed for irritation from targretin gel.    Encounter Date: Sep 19, 2017    CC:  Chief Complaint   Patient presents with     Skin Check     Lyndsey is here for a [facility] skin check.  States she has concerning areas under her left arm, back, and face.            History of Present Illness:  Ms. Lyndsey Hagen is a 66 year old female with past medical history of CTCL who presents as a follow-up for skin check. She was last seen on 4/26/17 when her UVA1 was discontinued due a problem with her insurance. Since the last visit, she has had a few papules and patches, mostly on her torso, to which she has been applying targretin gel. She currently has a papule on her left cheek and a few hypopigmented patches on her back. She endorses use of the tanning mccall about 5 times over the last 5 months. Her medicare insurance was activated on 9/1/17 and she is interested in restarting UVA1 treatments at this time. No concerning lesions at this time. No systemic symptoms noted.    Past Medical History:   Patient Active Problem List   Diagnosis     Cutaneous T-cell lymphoma (H)     Past Medical History:   Diagnosis Date     Cutaneous T-cell lymphoma (H)      Past Surgical History:   Procedure Laterality Date     NO HISTORY OF SURGERY  10/28/31    derm       Social History:  The patient is a retired in [employer].    Family History:  There is a family history of non-melanoma skin cancer in the patient's mother and sisters .    Medications:  Current Outpatient Prescriptions   Medication Sig Dispense Refill     TARGRETIN 1 %  GEL APPLY EVERY OTHER NIGHT TO EVERY NIGHT AS DIRECTED 60 g 5     triamcinolone (KENALOG) 0.1 % cream Use in morning to rash 454 g 5     Multiple Vitamin (MULTIVITAMINS PO) Take  by mouth daily.       Probiotic Product (SOLUBLE FIBER/PROBIOTICS PO) Take 2 tablets by mouth daily.       KRILL OIL PO Take  by mouth daily.       Cholecalciferol (VITAMIN D-3 PO) Take 1 tablet by mouth daily.       Coenzyme Q10 (CO Q-10 PO) Take 1 tablet by mouth daily.       Allergies   Allergen Reactions     Nkda [No Known Drug Allergies]          Review of Systems:  -As per HPI  -Constitutional: The patient denies fatigue, fevers, chills, unintended weight loss, and night sweats.  -HEENT: Patient denies nonhealing oral sores.  -Skin: As above in HPI. No additional skin concerns.    Physical exam:  Vitals: /73 (BP Location: Right arm, Patient Position: Chair, Cuff Size: Adult Regular)  Pulse 74  GEN: This is a well developed, well-nourished female in no acute distress, in a pleasant mood.    SKIN: Full skin, which includes the head/face, both arms, chest, back, abdomen,both legs, genitalia and/or groin buttocks, digits and/or nails, was examined.  - 4 mm erythematous papule on the left zygoma  - scattered hypopigmented patches and plaques on trunk and extremities  - scattered light brown pigmented macules without irregularity consistent on the torso and extremities  -No other lesions of concern on areas examined.   LYMPH: No cervical, submandibular, axillary or inguinal adenopathy    Impression/Plan:  1. CTCL; stage IIB: Has one erythematous papule on left zygoma and several hypopigmented patches and plaques on exam. She has been off UVA1 since April 2017 due to insurance problems, but is now on Medicare. She would like to restart UVA1 treatment at this time.     Restart UVA1 phototherapy at medium dose twice weekly. Consent form signed in clinic today.     Continue targretin 1% gel as needed to active lesions    Triamcinolone  0.1% cream as needed for irritation from targretin gel    Continue daily whole body moisturizing         Follow-up in 3 months, earlier for new or changing lesions.     Staff Involved:  Scribed by Norberto Cummings, MS4 for Dr. Guillory.    .The medical student acted as scribe for this encounter.  The encounter documented above was completely performed by myself.  Arlen Guillory MD

## 2017-09-19 NOTE — NURSING NOTE
Dermatology Rooming Note    Lyndsey Hagen's goals for this visit include:   Chief Complaint   Patient presents with     Skin Check     Lyndsey is here for a [facility] skin check.  States she has concerning areas under her left arm, back, and face.          Rhonda Cerrato LPN

## 2017-09-19 NOTE — LETTER
9/19/2017       RE: Lyndsey Hagen  1938 [address] [address]     Dear Colleague,    Thank you for referring your patient, Lyndsey Hagen, to the [facility] DERMATOLOGY at [facility]. Please see a copy of my visit note below.    [facility] Dermatology Note      Dermatology Problem List:  1. CTCL, stage IIB  -Previously treated with PUVA with good response. Treated at [place] prior to establishing care here. Stopped due to difficulty percuring oxsoralen and risks associated with PUVA.  -Temporarily discontinuing UVA1 (4/26/17) due to insurance  -Targretin 1% gel (added on 5/23/16) with good response.   -Triamcinolone 0.1% cream as needed for irritation from targretin gel.    Encounter Date: Sep 19, 2017    CC:  Chief Complaint   Patient presents with     Skin Check     Lyndsey is here for a [employer] skin check.  States she has concerning areas under her left arm, back, and face.            History of Present Illness:  Ms. Lyndsey Hagen is a 66 year old female with past medical history of CTCL who presents as a follow-up for skin check. She was last seen on 4/26/17 when her UVA1 was discontinued due a problem with her insurance. Since the last visit, she has had a few papules and patches, mostly on her torso, to which she has been applying targretin gel. She currently has a papule on her left cheek and a few hypopigmented patches on her back. She endorses use of the tanning mccall about 5 times over the last 5 months. Her medicare insurance was activated on 9/1/17 and she is interested in restarting UVA1 treatments at this time. No concerning lesions at this time. No systemic symptoms noted.    Past Medical History:   Patient Active Problem List   Diagnosis     Cutaneous T-cell lymphoma (H)     Past Medical History:   Diagnosis Date     Cutaneous T-cell lymphoma (H)      Past Surgical History:   Procedure Laterality Date     NO HISTORY OF SURGERY  10/28/31    derm        Social History:  The patient is a retired in [employer].    Family History:  There is a family history of non-melanoma skin cancer in the patient's mother and sisters .    Medications:  Current Outpatient Prescriptions   Medication Sig Dispense Refill     TARGRETIN 1 % GEL APPLY EVERY OTHER NIGHT TO EVERY NIGHT AS DIRECTED 60 g 5     triamcinolone (KENALOG) 0.1 % cream Use in morning to rash 454 g 5     Multiple Vitamin (MULTIVITAMINS PO) Take  by mouth daily.       Probiotic Product (SOLUBLE FIBER/PROBIOTICS PO) Take 2 tablets by mouth daily.       KRILL OIL PO Take  by mouth daily.       Cholecalciferol (VITAMIN D-3 PO) Take 1 tablet by mouth daily.       Coenzyme Q10 (CO Q-10 PO) Take 1 tablet by mouth daily.       Allergies   Allergen Reactions     Nkda [No Known Drug Allergies]          Review of Systems:  -As per HPI  -Constitutional: The patient denies fatigue, fevers, chills, unintended weight loss, and night sweats.  -HEENT: Patient denies nonhealing oral sores.  -Skin: As above in HPI. No additional skin concerns.    Physical exam:  Vitals: /73 (BP Location: Right arm, Patient Position: Chair, Cuff Size: Adult Regular)  Pulse 74  GEN: This is a well developed, well-nourished female in no acute distress, in a pleasant mood.    SKIN: Full skin, which includes the head/face, both arms, chest, back, abdomen,both legs, genitalia and/or groin buttocks, digits and/or nails, was examined.  - 4 mm erythematous papule on the left zygoma  - scattered hypopigmented patches and plaques on trunk and extremities  - scattered light brown pigmented macules without irregularity consistent on the torso and extremities  -No other lesions of concern on areas examined.   LYMPH: No cervical, submandibular, axillary or inguinal adenopathy    Impression/Plan:  1. CTCL; stage IIB: Has one erythematous papule on left zygoma and several hypopigmented patches and plaques on exam. She has been off UVA1 since April 2017  due to insurance problems, but is now on Medicare. She would like to restart UVA1 treatment at this time.     Restart UVA1 phototherapy at medium dose twice weekly. Consent form signed in clinic today.     Continue targretin 1% gel as needed to active lesions    Triamcinolone 0.1% cream as needed for irritation from targretin gel    Continue daily whole body moisturizing         Follow-up in 3 months, earlier for new or changing lesions.     Staff Involved:  Scribed by Norberto Cummings, MS4 for Dr. Guillory.    .The medical student acted as scribe for this encounter.  The encounter documented above was completely performed by myself.  Arlen Guillory MD      Again, thank you for allowing me to participate in the care of your patient.      Sincerely,    Arlen Guillory MD

## 2017-09-19 NOTE — PATIENT INSTRUCTIONS
Patient Instructions for Narrow Band Ultraviolet B (NBUVB) Therapy    I will have pinkness or redness and my skin will be tan. I may have persistent redness or itching. Risks of the procedure include burn, pain, scar, skin discoloration, itching, eye damage, worsening of skin condition, skin aging and skin cancer. Multiple treatments may be required.     1. If your skin lesions are very scaly, you will be asked to apply mineral oil to the involved skin before treatment. This increases the penetration of the light.    2. Before your light treatment you will apply sunscreen to your lips to protect them from unnecessary ultraviolet light exposure.    3. You may also be asked to apply sunscreen to your face prior to light therapy if it is not affected by the skin condition. This is to protect your face from unnecessary ultraviolet light exposure.    4. All patients will wear ultraviolet light-blocking eye protection in the mccall. Close your eyes behind the goggles.     5. Female patients will apply sunscreen to the nipples and surrounding area. Male patients will cover the genitals with an opaque sock or supporter.     6. If you have orders to cover additional parts of your body during the treatment, by using an undergarment, shirt, etc., always use this same item every time to prevent burning.      7. You should not sunbathe or go to tanning booths during the time you are receiving light therapy. When you are outdoors you should apply a sunscreen to all exposed areas. If you get sunburned you will not be able to receive light treatments.    8. Light therapy has a drying effect on the skin, so it is important to apply a moisturizing cream or ointment frequently.    9. You may want to try and schedule a light appointment on the same day as your follow-up clinic visits.    10. Report any redness or burning that lasts longer than 24 hours, any increase in itching, or changes in skin condition. You should feel like your  "skin has a \"sun kissed\" feeling.    11. Inform your phototherapy nurse or your Dermatology doctor, before having any more light treatments, if (a) you start taking any new medication or (b) you develop any new health problems.     12. Please do not bring children to your phototherapy appointments. If children must come with you, please bring a responsible adult caregiver to supervise them.    13. IF YOU ARE HAVING TREATMENT AT THE [facility], you must check with your insurance company to make sure phototherapy is covered before you can start treatment.    Procedure codes to tell your insurance company are:     UVB- 17414 if you are using petroleum or sunscreen during the treatment    [id number] if you are not going petroleum or sunscreen.     [id number]       Who should I call with questions?    [facility]: [phone]     [facility]: [phone]    For urgent needs outside of business hours call the [facility] at [phone] and ask for the dermatology resident on call        "

## 2017-09-22 ENCOUNTER — OFFICE VISIT (OUTPATIENT)
Dept: DERMATOLOGY | Facility: CLINIC | Age: 67
End: 2017-09-22

## 2017-09-22 DIAGNOSIS — C84.00 MYCOSIS FUNGOIDES, UNSPECIFIED BODY REGION (H): ICD-10-CM

## 2017-09-22 NOTE — MR AVS SNAPSHOT
After Visit Summary   9/22/2017    Lyndsey Hagen    MRN: [id number]           Patient Information     Date Of Birth          1950        Visit Information        Provider Department      9/22/2017 9:00 AM UC DERM LIGHT TREATMENT [facility] Dermatology        Today's Diagnoses     Mycosis fungoides, unspecified body region (H)           Follow-ups after your visit        Your next 10 appointments already scheduled     Sep 25, 2017 11:05 AM CDT   (Arrive by 10:50 AM)   Derm Light Extended with UC DERM LIGHT TREATMENT   [facility])    [address] [place] [address]   [phone]            Sep 28, 2017  8:05 AM CDT   (Arrive by 7:50 AM)   Derm Light Extended with UC DERM LIGHT TREATMENT   [facility])    [address] [place] [address]   [phone]            Oct 02, 2017  9:30 AM CDT   (Arrive by 9:15 AM)   Derm Light Extended with UC DERM LIGHT TREATMENT   [facility])    [address] [place] [address]   [phone]            Oct 13, 2017  9:30 AM CDT   (Arrive by 9:15 AM)   Derm Light Extended with UC DERM LIGHT TREATMENT   [facility])    [address] [place] [address]   [phone]            Oct 16, 2017  9:30 AM CDT   (Arrive by 9:15 AM)   Derm Light Extended with UC DERM LIGHT TREATMENT   [facility])    [address] [place] [address]   [phone]            Oct 23, 2017 11:35 AM CDT   (Arrive by 11:20 AM)   Derm Light Extended with UC DERM LIGHT TREATMENT   [facility])    [address] [place] [address]   [phone]            Oct 27, 2017  9:30 AM CDT    (Arrive by 9:15 AM)   Derm Light Extended with UC DERM LIGHT TREATMENT   [facility] ([facility])    [address] [address]   [phone]            Oct 30, 2017  7:35 AM CDT   (Arrive by 7:20 AM)   Derm Light Extended with UC DERM LIGHT TREATMENT   [facility] ([facility])    [address] [address]   [phone]            Nov 03, 2017  8:05 AM CDT   (Arrive by 7:50 AM)   Derm Light Extended with UC DERM LIGHT TREATMENT   [facility] ([facility])    [address] [place] [address]   [phone]            Nov 20, 2017  7:30 AM CST   (Arrive by 7:15 AM)   Derm Light Extended with UC DERM LIGHT TREATMENT   [facility] ([facility])    [address] [place] [address] [phone]              Who to contact     Please call your clinic at [phone] to:    Ask questions about your health    Make or cancel appointments    Discuss your medicines    Learn about your test results    Speak to your doctor   If you have compliments or concerns about an experience at your clinic, or if you wish to file a complaint, please contact [facility] Patient Relations at [phone] or email us at [email or web address]         Additional Information About Your Visit        [employer] Information     MobileMD gives you secure access to your electronic health record. If you see a primary care provider, you can also send messages to your care team and make appointments. If you have questions, please call your primary care clinic.  If you do not have a primary care provider, please call [phone] and they will assist you.      MobileMD is an electronic gateway that provides easy, online access to your medical records. With MobileMD, you can  request a clinic appointment, read your test results, renew a prescription or communicate with your care team.     To access your existing account, please contact your [facility] or call [phone] for assistance.        Care EveryWhere ID     This is your Care EveryWhere ID. This could be used by other organizations to access your [facility] medical records  [id number]         Blood Pressure from Last 3 Encounters:   09/19/17 109/73   02/20/17 118/75   11/21/16 120/75    Weight from Last 3 Encounters:   05/23/16 73.5 kg (162 lb)   04/25/16 72.6 kg (160 lb)   02/22/16 76.1 kg (167 lb 12.8 oz)              We Performed the Following     CHARGE - PHOTOTHERAPY - UVA1     PROCEDURE - PHOTOTHERAPY UVA1        Primary Care Provider Office Phone # Fax #    April Roslava [phone]       [place] [address]        Equal Access to Services     KARTHIK PAULA : Hadii aad ku hadasho Soomaali, waaxda luqadaha, qaybta kaalmada adeegyada, waxay idiin hayturnern chery miranda . So [facility] [phone].    ATENCIÓN: Si gabby herring, tiene a bustillos disposición servicios gratuitos de asistencia lingüística. Llame al [phone].    We comply with applicable federal civil rights laws and [place] laws. We do not discriminate on the basis of race, color, national origin, age, disability sex, sexual orientation or gender identity.            Thank you!     Thank you for choosing [facility] DERMATOLOGY  for your care. Our goal is always to provide you with excellent care. Hearing back from our patients is one way we can continue to improve our services. Please take a few minutes to complete the written survey that you may receive in the mail after your visit with us. Thank you!             Your Updated Medication List - Protect others around you: Learn how to safely use, store and throw away your medicines at www.disposemymeds.org.          This list is  accurate as of: 9/22/17 12:54 PM.  Always use your most recent med list.                   Brand Name Dispense Instructions for use Diagnosis    CO Q-10 PO      Take 1 tablet by mouth daily.        KRILL OIL PO      Take  by mouth daily.        MULTIVITAMINS PO      Take  by mouth daily.        SOLUBLE FIBER/PROBIOTICS PO      Take 2 tablets by mouth daily.        TARGRETIN 1 % Gel   Generic drug:  Bexarotene     60 g    APPLY EVERY OTHER NIGHT TO EVERY NIGHT AS DIRECTED    CTCL (cutaneous T-cell lymphoma) (H)       triamcinolone 0.1 % cream    KENALOG    454 g    Use in morning to rash    CTCL (cutaneous T-cell lymphoma) (H)       VITAMIN D-3 PO      Take 1 tablet by mouth daily.

## 2017-09-22 NOTE — PROGRESS NOTES
[facility] Dermatology Phototherapy Record  1. Lyndsey Hagen is a 66 year old female is here today for phototherapy (UVA-1) treatment for Mycosis fungoides, unspecified body region.        Changes or new medications since last treatment:   NO    New medical conditions or problems since last treatment:   NO    Any problems with last phototherapy treatment?    NO    2. The patient tolerated phototherapy without complication    Patient will return for next UVA treatment, per protocol.     Patient to see provider every 4-12 weeks for follow-up during treatment.      All questions and concerns discussed with patient in clinic today.      Pooja Jordan RN

## 2017-09-22 NOTE — NURSING NOTE
Lyndsey Hagen comes into clinic today at the request of Dr Guillory Ordering Provider for UVA-1 light therapy.    This service provided today was under the supervising provider of the day Dr Vincent, who was available if needed.    Reason for visit: Phototherapy    Pooja Jordan RN

## 2017-09-25 ENCOUNTER — OFFICE VISIT (OUTPATIENT)
Dept: DERMATOLOGY | Facility: CLINIC | Age: 67
End: 2017-09-25

## 2017-09-25 DIAGNOSIS — C84.00 MYCOSIS FUNGOIDES, UNSPECIFIED BODY REGION (H): ICD-10-CM

## 2017-09-25 NOTE — MR AVS SNAPSHOT
After Visit Summary   9/25/2017    Lyndsey Hagen    MRN: [id number]           Patient Information     Date Of Birth          1950        Visit Information        Provider Department      9/25/2017 11:05 AM UC DERM LIGHT TREATMENT [facility] Dermatology        Today's Diagnoses     Mycosis fungoides, unspecified body region (H)           Follow-ups after your visit        Your next 10 appointments already scheduled     Sep 28, 2017 10:20 AM CDT   (Arrive by 10:05 AM)   Derm Light Extended with UC DERM LIGHT TREATMENT   [facility])    [address] [place] [address]   [phone]            Oct 02, 2017  9:30 AM CDT   (Arrive by 9:15 AM)   Derm Light Extended with UC DERM LIGHT TREATMENT   [facility])    [address] [place] [address]   [phone]            Oct 13, 2017  9:30 AM CDT   (Arrive by 9:15 AM)   Derm Light Extended with UC DERM LIGHT TREATMENT   [facility])    [address] [place] [address]   [phone]            Oct 16, 2017  9:30 AM CDT   (Arrive by 9:15 AM)   Derm Light Extended with UC DERM LIGHT TREATMENT   [facility])    [address] [place] [address]   [phone]            Oct 23, 2017 11:35 AM CDT   (Arrive by 11:20 AM)   Derm Light Extended with UC DERM LIGHT TREATMENT   [facility])    [address] [place] [address]   [phone]            Oct 27, 2017  9:30 AM CDT   (Arrive by 9:15 AM)   Derm Light Extended with UC DERM LIGHT TREATMENT   [facility])    [address] [place] [address]   [phone]            Oct 30, 2017  7:35 AM  CDT   (Arrive by 7:20 AM)   Derm Light Extended with UC DERM LIGHT TREATMENT   [facility] ([facility])    [address] [address]   [phone]            Nov 03, 2017  8:05 AM CDT   (Arrive by 7:50 AM)   Derm Light Extended with UC DERM LIGHT TREATMENT   [facility] ([facility])    [address] [address] [phone]            Nov 20, 2017  7:30 AM CST   (Arrive by 7:15 AM)   Derm Light Extended with UC DERM LIGHT TREATMENT   [facility] ([facility])    [address] [address]   [phone]            Nov 27, 2017  7:30 AM CST   (Arrive by 7:15 AM)   Derm Light Extended with UC DERM LIGHT TREATMENT   [facility] ([facility])    [address] [place] [id number] [phone]              Who to contact     Please call your clinic at [phone] to:    Ask questions about your health    Make or cancel appointments    Discuss your medicines    Learn about your test results    Speak to your doctor   If you have compliments or concerns about an experience at your clinic, or if you wish to file a complaint, please contact [facility] Patient Relations at [phone] or email us at [email or web address]         Additional Information About Your Visit        [employer] Information     Face [employer] gives you secure access to your electronic health record. If you see a primary care provider, you can also send messages to your care team and make appointments. If you have questions, please call your primary care clinic.  If you do not have a primary care provider, please call [phone] and they will assist you.      Face [employer] Live is an electronic gateway that provides easy, online access to your medical records. With [employer] Live, you  can request a clinic appointment, read your test results, renew a prescription or communicate with your care team.     To access your existing account, please contact your [facility] or call [phone] for assistance.        Care EveryWhere ID     This is your Care EveryWhere ID. This could be used by other organizations to access your [place] medical records  [id number]         Blood Pressure from Last 3 Encounters:   09/19/17 109/73   02/20/17 118/75   11/21/16 120/75    Weight from Last 3 Encounters:   05/23/16 73.5 kg (162 lb)   04/25/16 72.6 kg (160 lb)   02/22/16 76.1 kg (167 lb 12.8 oz)              We Performed the Following     CHARGE - PHOTOTHERAPY - UVA1     PROCEDURE - PHOTOTHERAPY UVA1        Primary Care Provider Office Phone # Fax #    April Rosalva [phone]       [place] [address]        Equal Access to Services     KARTHIK PAULA : Hadii aad ku hadasho Soomaali, waaxda luqadaha, qaybta kaalmada adeegyada, waxay idiin hayaan chery miranda . So [facility] [phone].    ATENCIÓN: Si bernardala nevaeh, tiene a bustillos disposición servicios gratuitos de asistencia lingüística. Llame al [phone].    We comply with applicable federal civil rights laws and [place] laws. We do not discriminate on the basis of race, color, national origin, age, disability sex, sexual orientation or gender identity.            Thank you!     Thank you for choosing [facility] DERMATOLOGY  for your care. Our goal is always to provide you with excellent care. Hearing back from our patients is one way we can continue to improve our services. Please take a few minutes to complete the written survey that you may receive in the mail after your visit with us. Thank you!             Your Updated Medication List - Protect others around you: Learn how to safely use, store and throw away your medicines at www.disposemymeds.org.          This list  is accurate as of: 9/25/17 12:09 PM.  Always use your most recent med list.                   Brand Name Dispense Instructions for use Diagnosis    CO Q-10 PO      Take 1 tablet by mouth daily.        KRILL OIL PO      Take  by mouth daily.        MULTIVITAMINS PO      Take  by mouth daily.        SOLUBLE FIBER/PROBIOTICS PO      Take 2 tablets by mouth daily.        TARGRETIN 1 % Gel   Generic drug:  Bexarotene     60 g    APPLY EVERY OTHER NIGHT TO EVERY NIGHT AS DIRECTED    CTCL (cutaneous T-cell lymphoma) (H)       triamcinolone 0.1 % cream    KENALOG    454 g    Use in morning to rash    CTCL (cutaneous T-cell lymphoma) (H)       VITAMIN D-3 PO      Take 1 tablet by mouth daily.

## 2017-09-25 NOTE — PROGRESS NOTES
[facility] Dermatology Phototherapy Record  1. Lyndsey Hagen is a 66 year old female is here today for phototherapy (UVB) treatment for Mycosis fungoides, unspecified body region (H.        Changes or new medications since last treatment:   NO    New medical conditions or problems since last treatment:   NO    Any problems with last phototherapy treatment?    NO    2. The patient tolerated phototherapy without complication    Patient will return on  for next UVB treatment, per protocol.     Patient to see provider every 4-12 weeks for follow-up during treatment.      All questions and concerns discussed with patient in clinic today.      Lyndsey Hagen comes into clinic today at the request of Dr. Guillory Ordering Provider for UVA1.        This service provided today was under the supervising provider of the day Dr. Vincent, who was available if needed.    Patrica Fuentes

## 2017-09-28 ENCOUNTER — OFFICE VISIT (OUTPATIENT)
Dept: DERMATOLOGY | Facility: CLINIC | Age: 67
End: 2017-09-28

## 2017-09-28 ENCOUNTER — TELEPHONE (OUTPATIENT)
Dept: DERMATOLOGY | Facility: CLINIC | Age: 67
End: 2017-09-28

## 2017-09-28 DIAGNOSIS — C84.00 MYCOSIS FUNGOIDES, UNSPECIFIED BODY REGION (H): Primary | ICD-10-CM

## 2017-09-28 DIAGNOSIS — C84.00 MYCOSIS FUNGOIDES, UNSPECIFIED BODY REGION (H): ICD-10-CM

## 2017-09-28 NOTE — MR AVS SNAPSHOT
After Visit Summary   9/28/2017    Lyndsey Hagen    MRN: [id number]           Patient Information     Date Of Birth          1950        Visit Information        Provider Department      9/28/2017 10:20 AM UC DERM LIGHT TREATMENT [facility] Dermatology        Today's Diagnoses     Mycosis fungoides, unspecified body region (H)           Follow-ups after your visit        Your next 10 appointments already scheduled     Oct 02, 2017  9:30 AM CDT   (Arrive by 9:15 AM)   Derm Light Extended with UC DERM LIGHT TREATMENT   [facility])    [address] [place] [address]   [phone]            Oct 13, 2017  9:30 AM CDT   (Arrive by 9:15 AM)   Derm Light Extended with UC DERM LIGHT TREATMENT   [facility])    [address] [place] [address]   [phone]            Oct 16, 2017  9:30 AM CDT   (Arrive by 9:15 AM)   Derm Light Extended with UC DERM LIGHT TREATMENT   [facility])    [address] [place] [address]   [phone]            Oct 23, 2017 11:35 AM CDT   (Arrive by 11:20 AM)   Derm Light Extended with UC DERM LIGHT TREATMENT   [facility])    [address] [place] [address]   [phone]            Oct 27, 2017  9:30 AM CDT   (Arrive by 9:15 AM)   Derm Light Extended with UC DERM LIGHT TREATMENT   [facility])    [address] [place] [address]   [phone]            Oct 30, 2017  7:35 AM CDT   (Arrive by 7:20 AM)   Derm Light Extended with UC DERM LIGHT TREATMENT   [facility])    [address] [place] [address]   [phone]            Nov 03, 2017  8:05 AM CDT    (Arrive by 7:50 AM)   Derm Light Extended with UC DERM LIGHT TREATMENT   [facility] ([facility])    [address] [address]   [phone]            Nov 20, 2017  7:30 AM CST   (Arrive by 7:15 AM)   Derm Light Extended with UC DERM LIGHT TREATMENT   [facility] ([facility])    [address] [address]   [phone]            Nov 27, 2017  7:30 AM CST   (Arrive by 7:15 AM)   Derm Light Extended with UC DERM LIGHT TREATMENT   [facility] ([facility])    [address] [place] [address]   [phone]            Dec 01, 2017  8:00 AM CST   (Arrive by 7:45 AM)   Derm Light Extended with UC DERM LIGHT TREATMENT   [facility] ([facility])    [address] [place] [id number] [phone]              Who to contact     Please call your clinic at [phone] to:    Ask questions about your health    Make or cancel appointments    Discuss your medicines    Learn about your test results    Speak to your doctor   If you have compliments or concerns about an experience at your clinic, or if you wish to file a complaint, please contact [facility] Patient Relations at [phone] or email us at [email or web address]         Additional Information About Your Visit        [employer] Information     [facility] gives you secure access to your electronic health record. If you see a primary care provider, you can also send messages to your care team and make appointments. If you have questions, please call your primary care clinic.  If you do not have a primary care provider, please call [phone] and they will assist you.      [facility] is an electronic gateway that provides easy, online access to your medical records. With [facility], you can  request a clinic appointment, read your test results, renew a prescription or communicate with your care team.     To access your existing account, please contact your [facility] or call [phone] for assistance.        Care EveryWhere ID     This is your Care EveryWhere ID. This could be used by other organizations to access your [place] medical records  [id number]         Blood Pressure from Last 3 Encounters:   09/19/17 109/73   02/20/17 118/75   11/21/16 120/75    Weight from Last 3 Encounters:   05/23/16 73.5 kg (162 lb)   04/25/16 72.6 kg (160 lb)   02/22/16 76.1 kg (167 lb 12.8 oz)              We Performed the Following     CHARGE - PHOTOTHERAPY - UVA1     PROCEDURE - PHOTOTHERAPY UVA1        Primary Care Provider Office Phone # Fax #    April Rosalva [phone]       [place] [address]        Equal Access to Services     KARTHIK PAULA : Hadii aad ku hadasho Soomaali, waaxda luqadaha, qaybta kaalmada adeegyada, waxay idiin hayturnern chery miranda . So [facility] [phone].    ATENCIÓN: Si gabby herring, tiene a bustillos disposición servicios gratuitos de asistencia lingüística. Llame al [phone].    We comply with applicable federal civil rights laws and [place] laws. We do not discriminate on the basis of race, color, national origin, age, disability sex, sexual orientation or gender identity.            Thank you!     Thank you for choosing [facility] DERMATOLOGY  for your care. Our goal is always to provide you with excellent care. Hearing back from our patients is one way we can continue to improve our services. Please take a few minutes to complete the written survey that you may receive in the mail after your visit with us. Thank you!             Your Updated Medication List - Protect others around you: Learn how to safely use, store and throw away your medicines at www.disposemymeds.org.          This list is  accurate as of: 9/28/17 10:33 AM.  Always use your most recent med list.                   Brand Name Dispense Instructions for use Diagnosis    CO Q-10 PO      Take 1 tablet by mouth daily.        KRILL OIL PO      Take  by mouth daily.        MULTIVITAMINS PO      Take  by mouth daily.        SOLUBLE FIBER/PROBIOTICS PO      Take 2 tablets by mouth daily.        TARGRETIN 1 % Gel   Generic drug:  Bexarotene     60 g    APPLY EVERY OTHER NIGHT TO EVERY NIGHT AS DIRECTED    CTCL (cutaneous T-cell lymphoma) (H)       triamcinolone 0.1 % cream    KENALOG    454 g    Use in morning to rash    CTCL (cutaneous T-cell lymphoma) (H)       VITAMIN D-3 PO      Take 1 tablet by mouth daily.

## 2017-09-28 NOTE — NURSING NOTE
Lyndsey Hagen comes into clinic today at the request of Dr Guillory Ordering Provider for UVA-1 light therapy.    This service provided today was under the supervising provider of the day Dr Rodríguez, who was available if needed.    Reason for visit: Phototherapy    Pooja Jordan RN

## 2017-09-28 NOTE — PROGRESS NOTES
[facility] Dermatology Phototherapy Record  1. Lyndsey Hagen is a 66 year old female is here today for phototherapy (UVB) treatment for Mycosis fungoides, unspecified body region .        Changes or new medications since last treatment:   NO    New medical conditions or problems since last treatment:   NO    Any problems with last phototherapy treatment?    NO    2. The patient tolerated phototherapy without complication    Patient will return for next UVB treatment, per protocol.     Patient to see provider every 4-12 weeks for follow-up during treatment.      All questions and concerns discussed with patient in clinic today.      Pooja Jordan RN

## 2017-09-28 NOTE — TELEPHONE ENCOUNTER
Patient in today for Phototherapy. She is wondering why dose is less than what it was before. Notes state she was restarted at a medium dose twice weekly which is 40J. She wonders if we are gradually increasing her or if she stays at this dose. Writer was not able to confirm. Routing to provider

## 2017-10-02 ENCOUNTER — OFFICE VISIT (OUTPATIENT)
Dept: DERMATOLOGY | Facility: CLINIC | Age: 67
End: 2017-10-02

## 2017-10-02 DIAGNOSIS — C84.00 MYCOSIS FUNGOIDES, UNSPECIFIED BODY REGION (H): ICD-10-CM

## 2017-10-02 NOTE — MR AVS SNAPSHOT
After Visit Summary   10/2/2017    Lyndsey Hagen    MRN: [id number]           Patient Information     Date Of Birth          1950        Visit Information        Provider Department      10/2/2017 9:30 AM UC DERM LIGHT TREATMENT [facility] Dermatology        Today's Diagnoses     Mycosis fungoides, unspecified body region (H)           Follow-ups after your visit        Your next 10 appointments already scheduled     Oct 13, 2017  9:30 AM CDT   (Arrive by 9:15 AM)   Derm Light Extended with UC DERM LIGHT TREATMENT   [facility])    [address] [place] [address]   [phone]            Oct 16, 2017  9:30 AM CDT   (Arrive by 9:15 AM)   Derm Light Extended with UC DERM LIGHT TREATMENT   [facility])    [address] [place] [address]   [phone]            Oct 23, 2017 11:35 AM CDT   (Arrive by 11:20 AM)   Derm Light Extended with UC DERM LIGHT TREATMENT   [facility])    [address] [place] [address]   [phone]            Oct 27, 2017  9:30 AM CDT   (Arrive by 9:15 AM)   Derm Light Extended with UC DERM LIGHT TREATMENT   [facility])    [address] [place] [address]   [phone]            Oct 30, 2017  7:35 AM CDT   (Arrive by 7:20 AM)   Derm Light Extended with UC DERM LIGHT TREATMENT   [facility])    [address] [place] [address]   [phone]            Nov 03, 2017  8:05 AM CDT   (Arrive by 7:50 AM)   Derm Light Extended with UC DERM LIGHT TREATMENT   [facility])    [address] [place] [address]   [phone]            Nov 20, 2017  7:30 AM CST    (Arrive by 7:15 AM)   Derm Light Extended with UC DERM LIGHT TREATMENT   [facility] ([facility])    [address] [address]   [phone]            Nov 27, 2017  7:30 AM CST   (Arrive by 7:15 AM)   Derm Light Extended with UC DERM LIGHT TREATMENT   [facility] ([facility])    [address] [address]   [phone]            Dec 01, 2017  8:00 AM CST   (Arrive by 7:45 AM)   Derm Light Extended with UC DERM LIGHT TREATMENT   [facility] ([facility])    [address] [place] [address]   [phone]            Dec 04, 2017  7:30 AM CST   (Arrive by 7:15 AM)   Derm Light Extended with UC DERM LIGHT TREATMENT   [facility] ([facility])    [address] [place] [address] [phone]              Who to contact     Please call your clinic at [phone] to:    Ask questions about your health    Make or cancel appointments    Discuss your medicines    Learn about your test results    Speak to your doctor   If you have compliments or concerns about an experience at your clinic, or if you wish to file a complaint, please contact [facility] Patient Relations at [phone] or email us at [email or web address]         Additional Information About Your Visit        [employer] Information     [email or web address] gives you secure access to your electronic health record. If you see a primary care provider, you can also send messages to your care team and make appointments. If you have questions, please call your primary care clinic.  If you do not have a primary care provider, please call [phone] and they will assist you.      [email or web address] is an electronic gateway that provides easy, online access to your medical records. With [email or web address], you can  request a clinic appointment, read your test results, renew a prescription or communicate with your care team.     To access your existing account, please contact your [facility] or call [phone] for assistance.        Care EveryWhere ID     This is your Care EveryWhere ID. This could be used by other organizations to access your [place] medical records  [id number]         Blood Pressure from Last 3 Encounters:   09/19/17 109/73   02/20/17 118/75   11/21/16 120/75    Weight from Last 3 Encounters:   05/23/16 73.5 kg (162 lb)   04/25/16 72.6 kg (160 lb)   02/22/16 76.1 kg (167 lb 12.8 oz)              We Performed the Following     CHARGE - PHOTOTHERAPY - UVA1     PROCEDURE - PHOTOTHERAPY UVA1        Primary Care Provider Office Phone # Fax #    April Rosalva [phone]       [place] [address]        Equal Access to Services     KARTHIK PAULA : Hadii aad ku hadasho Soomaali, waaxda luqadaha, qaybta kaalmada adeegyada, waxay idiin hayturnern chery miranda . So [facility] [phone].    ATENCIÓN: Si gabby herring, tiene a bustillos disposición servicios gratuitos de asistencia lingüística. Llame al [phone].    We comply with applicable federal civil rights laws and [place] laws. We do not discriminate on the basis of race, color, national origin, age, disability, sex, sexual orientation, or gender identity.            Thank you!     Thank you for choosing [facility] DERMATOLOGY  for your care. Our goal is always to provide you with excellent care. Hearing back from our patients is one way we can continue to improve our services. Please take a few minutes to complete the written survey that you may receive in the mail after your visit with us. Thank you!             Your Updated Medication List - Protect others around you: Learn how to safely use, store and throw away your medicines at www.disposemymeds.org.          This list  is accurate as of: 10/2/17 10:06 AM.  Always use your most recent med list.                   Brand Name Dispense Instructions for use Diagnosis    CO Q-10 PO      Take 1 tablet by mouth daily.        KRILL OIL PO      Take  by mouth daily.        MULTIVITAMINS PO      Take  by mouth daily.        SOLUBLE FIBER/PROBIOTICS PO      Take 2 tablets by mouth daily.        TARGRETIN 1 % Gel   Generic drug:  Bexarotene     60 g    APPLY EVERY OTHER NIGHT TO EVERY NIGHT AS DIRECTED    CTCL (cutaneous T-cell lymphoma) (H)       triamcinolone 0.1 % cream    KENALOG    454 g    Use in morning to rash    CTCL (cutaneous T-cell lymphoma) (H)       VITAMIN D-3 PO      Take 1 tablet by mouth daily.

## 2017-10-02 NOTE — PROGRESS NOTES
[facility] Dermatology Phototherapy Record  1. Lyndsey Hagen is a 67 year old female is here today for phototherapy (UVB) treatment for Mycosis fungoides, unspecified body region.        Changes or new medications since last treatment:   NO    New medical conditions or problems since last treatment:   NO    Any problems with last phototherapy treatment?    NO    2. The patient tolerated phototherapy without complication    Patient will return on  for next UVB treatment, per protocol.     Patient to see provider every 4-12 weeks for follow-up during treatment.      All questions and concerns discussed with patient in clinic today.      Lyndsey Hagen comes into clinic today at the request of Dr. Guillory Ordering Provider for UVA1.        This service provided today was under the supervising provider of the day Dr. Vincent, who was available if needed.    Reason for visit: Phototherapy  Patrica Fuentes

## 2017-10-12 ENCOUNTER — RECORDS - HEALTHEAST (OUTPATIENT)
Dept: ADMINISTRATIVE | Facility: OTHER | Age: 67
End: 2017-10-12

## 2017-10-13 ENCOUNTER — OFFICE VISIT (OUTPATIENT)
Dept: DERMATOLOGY | Facility: CLINIC | Age: 67
End: 2017-10-13

## 2017-10-13 DIAGNOSIS — C84.00 MYCOSIS FUNGOIDES, UNSPECIFIED BODY REGION (H): ICD-10-CM

## 2017-10-13 NOTE — NURSING NOTE
Lyndsey Hagen comes into clinic today at the request of Dr Guillory Ordering Provider for UVA-1.    This service provided today was under the supervising provider of the day Dr Beasley, who was available if needed.    Reason for visit: Phototherapy    Pooja Jordan RN

## 2017-10-13 NOTE — MR AVS SNAPSHOT
After Visit Summary   10/13/2017    Lyndsey Hagen    MRN: [id number]           Patient Information     Date Of Birth          1950        Visit Information        Provider Department      10/13/2017 9:30 AM UC DERM LIGHT TREATMENT [facility] Dermatology        Today's Diagnoses     Mycosis fungoides, unspecified body region (H)           Follow-ups after your visit        Your next 10 appointments already scheduled     Oct 16, 2017  9:30 AM CDT   (Arrive by 9:15 AM)   Derm Light Extended with UC DERM LIGHT TREATMENT   [facility])    [address] [place] [address]   [phone]            Oct 23, 2017 11:35 AM CDT   (Arrive by 11:20 AM)   Derm Light Extended with UC DERM LIGHT TREATMENT   [facility])    [address] [place] [address]   [phone]            Oct 27, 2017  9:30 AM CDT   (Arrive by 9:15 AM)   Derm Light Extended with UC DERM LIGHT TREATMENT   [facility])    [address] [place] [address]   [phone]            Oct 30, 2017  7:35 AM CDT   (Arrive by 7:20 AM)   Derm Light Extended with UC DERM LIGHT TREATMENT   [facility])    [address] [place] [address]   [phone]            Nov 03, 2017  8:05 AM CDT   (Arrive by 7:50 AM)   Derm Light Extended with UC DERM LIGHT TREATMENT   [facility])    [address] [place] [address]   [phone]            Nov 20, 2017  7:30 AM CST   (Arrive by 7:15 AM)   Derm Light Extended with UC DERM LIGHT TREATMENT   [facility])    [address] [place] [address]   [phone]            Nov 27, 2017  7:30 AM  CST   (Arrive by 7:15 AM)   Derm Light Extended with UC DERM LIGHT TREATMENT   [facility] ([facility])    [address] [address]   [phone]            Dec 01, 2017  8:00 AM CST   (Arrive by 7:45 AM)   Derm Light Extended with UC DERM LIGHT TREATMENT   [facility] ([facility])    [address] [address]   [phone]            Dec 04, 2017  7:30 AM CST   (Arrive by 7:15 AM)   Derm Light Extended with UC DERM LIGHT TREATMENT   [facility] ([facility])    [address] [address] [phone]            Dec 08, 2017  8:00 AM CST   (Arrive by 7:45 AM)   Derm Light Extended with UC DERM LIGHT TREATMENT   [facility] ([facility])    [address] [place] [id number] [phone]              Who to contact     Please call your clinic at [phone] to:    Ask questions about your health    Make or cancel appointments    Discuss your medicines    Learn about your test results    Speak to your doctor   If you have compliments or concerns about an experience at your clinic, or if you wish to file a complaint, please contact [facility] Patient Relations at [phone] or email us at [email or web address]         Additional Information About Your Visit        [employer] Information     [employer] gives you secure access to your electronic health record. If you see a primary care provider, you can also send messages to your care team and make appointments. If you have questions, please call your primary care clinic.  If you do not have a primary care provider, please call [phone] and they will assist you.      [employer] is an electronic gateway that provides easy, online access to your medical records. With [employer], you  can request a clinic appointment, read your test results, renew a prescription or communicate with your care team.     To access your existing account, please contact your [facility] or call [phone] for assistance.        Care EveryWhere ID     This is your Care EveryWhere ID. This could be used by other organizations to access your [place] medical records  [id number]         Blood Pressure from Last 3 Encounters:   09/19/17 109/73   02/20/17 118/75   11/21/16 120/75    Weight from Last 3 Encounters:   05/23/16 73.5 kg (162 lb)   04/25/16 72.6 kg (160 lb)   02/22/16 76.1 kg (167 lb 12.8 oz)              We Performed the Following     CHARGE - PHOTOTHERAPY - UVA1     PROCEDURE - PHOTOTHERAPY UVA1        Primary Care Provider Office Phone # Fax #    April Rosalva [phone]       [place] [address]        Equal Access to Services     KARTHIK PAULA : Hadii aad ku hadasho Soomaali, waaxda luqadaha, qaybta kaalmada adeegyada, waxay idiin hayturnern chery miranda . So [facility] [phone].    ATENCIÓN: Si bernardala español, tiene a bustillos disposición servicios gratuitos de asistencia lingüística. Llame al [phone].    We comply with applicable federal civil rights laws and [place] laws. We do not discriminate on the basis of race, color, national origin, age, disability, sex, sexual orientation, or gender identity.            Thank you!     Thank you for choosing [facility] DERMATOLOGY  for your care. Our goal is always to provide you with excellent care. Hearing back from our patients is one way we can continue to improve our services. Please take a few minutes to complete the written survey that you may receive in the mail after your visit with us. Thank you!             Your Updated Medication List - Protect others around you: Learn how to safely use, store and throw away your medicines at www.disposemymeds.org.          This  list is accurate as of: 10/13/17 11:12 AM.  Always use your most recent med list.                   Brand Name Dispense Instructions for use Diagnosis    CO Q-10 PO      Take 1 tablet by mouth daily.        KRILL OIL PO      Take  by mouth daily.        MULTIVITAMINS PO      Take  by mouth daily.        SOLUBLE FIBER/PROBIOTICS PO      Take 2 tablets by mouth daily.        TARGRETIN 1 % Gel   Generic drug:  Bexarotene     60 g    APPLY EVERY OTHER NIGHT TO EVERY NIGHT AS DIRECTED    CTCL (cutaneous T-cell lymphoma) (H)       triamcinolone 0.1 % cream    KENALOG    454 g    Use in morning to rash    CTCL (cutaneous T-cell lymphoma) (H)       VITAMIN D-3 PO      Take 1 tablet by mouth daily.

## 2017-10-13 NOTE — PROGRESS NOTES
[facility] Dermatology Phototherapy Record  1. Lyndsey Hagen is a 67 year old female is here today for phototherapy (UVA-1) treatment for Mycosis fungoides, unspecified body region.        Changes or new medications since last treatment:   NO    New medical conditions or problems since last treatment:   NO    Any problems with last phototherapy treatment?    NO    2. The patient tolerated phototherapy without complication    Patient will return for next UVA treatment, per protocol.     Patient to see provider every 4-12 weeks for follow-up during treatment.      All questions and concerns discussed with patient in clinic today.      Pooja Jordan RN

## 2017-10-16 ENCOUNTER — OFFICE VISIT (OUTPATIENT)
Dept: DERMATOLOGY | Facility: CLINIC | Age: 67
End: 2017-10-16

## 2017-10-16 DIAGNOSIS — C84.00 MYCOSIS FUNGOIDES, UNSPECIFIED BODY REGION (H): ICD-10-CM

## 2017-10-16 NOTE — MR AVS SNAPSHOT
After Visit Summary   10/16/2017    Lyndsey Hagen    MRN: [id number]           Patient Information     Date Of Birth          1950        Visit Information        Provider Department      10/16/2017 9:30 AM UC DERM LIGHT TREATMENT [facility] Dermatology        Today's Diagnoses     Mycosis fungoides, unspecified body region (H)           Follow-ups after your visit        Your next 10 appointments already scheduled     Oct 23, 2017 11:35 AM CDT   (Arrive by 11:20 AM)   Derm Light Extended with UC DERM LIGHT TREATMENT   [facility])    [address] [place] [address]   [phone]            Oct 27, 2017  9:30 AM CDT   (Arrive by 9:15 AM)   Derm Light Extended with UC DERM LIGHT TREATMENT   [facility])    [address] [place] [address]   [phone]            Oct 30, 2017  7:35 AM CDT   (Arrive by 7:20 AM)   Derm Light Extended with UC DERM LIGHT TREATMENT   [facility])    [address] [place] [address]   [phone]            Nov 03, 2017  8:05 AM CDT   (Arrive by 7:50 AM)   Derm Light Extended with UC DERM LIGHT TREATMENT   [facility])    [address] [place] [address]   [phone]            Nov 20, 2017  7:30 AM CST   (Arrive by 7:15 AM)   Derm Light Extended with UC DERM LIGHT TREATMENT   [facility])    [address] [place] [address]   [phone]            Nov 27, 2017  7:30 AM CST   (Arrive by 7:15 AM)   Derm Light Extended with UC DERM LIGHT TREATMENT   [facility])    [address] [place] [address]   [phone]            Dec 01, 2017  8:00 AM  CST   (Arrive by 7:45 AM)   Derm Light Extended with UC DERM LIGHT TREATMENT   [facility] ([facility])    [address] [address]   [phone]            Dec 04, 2017  7:30 AM CST   (Arrive by 7:15 AM)   Derm Light Extended with UC DERM LIGHT TREATMENT   [facility] ([facility])    [address] [address]   [phone]            Dec 08, 2017  8:00 AM CST   (Arrive by 7:45 AM)   Derm Light Extended with UC DERM LIGHT TREATMENT   [facility] ([facility])    [address] [address]   [phone]            Dec 11, 2017  7:30 AM CST   (Arrive by 7:15 AM)   Derm Light Extended with UC DERM LIGHT TREATMENT   [facility] ([facility])    [address] [place] [address] [phone]              Who to contact     Please call your clinic at [phone] to:    Ask questions about your health    Make or cancel appointments    Discuss your medicines    Learn about your test results    Speak to your doctor   If you have compliments or concerns about an experience at your clinic, or if you wish to file a complaint, please contact [facility] Patient Relations at [phone] or email us at [email or web address]         Additional Information About Your Visit        Genoomhart Information     TimeGenius gives you secure access to your electronic health record. If you see a primary care provider, you can also send messages to your care team and make appointments. If you have questions, please call your primary care clinic.  If you do not have a primary care provider, please call [phone] and they will assist you.      TimeGenius is an electronic gateway that provides easy, online access to your medical records. With TimeGenius, you  can request a clinic appointment, read your test results, renew a prescription or communicate with your care team.     To access your existing account, please contact your [facility] or call [phone] for assistance.        Care EveryWhere ID     This is your Care EveryWhere ID. This could be used by other organizations to access your [place] medical records  [id number]         Blood Pressure from Last 3 Encounters:   09/19/17 109/73   02/20/17 118/75   11/21/16 120/75    Weight from Last 3 Encounters:   05/23/16 73.5 kg (162 lb)   04/25/16 72.6 kg (160 lb)   02/22/16 76.1 kg (167 lb 12.8 oz)              We Performed the Following     CHARGE - PHOTOTHERAPY - UVA1     PROCEDURE - PHOTOTHERAPY UVA1        Primary Care Provider Office Phone # Fax #    April Rosalva [phone]       [place] [address]        Equal Access to Services     KARTHIK PAULA : Hadii aad ku hadasho Soomaali, waaxda luqadaha, qaybta kaalmada adeegyada, waxay idiin hayturnern chery miranda . So [facility] [phone].    ATENCIÓN: Si bernardala español, tiene a bustillos disposición servicios gratuitos de asistencia lingüística. Llame al [phone].    We comply with applicable federal civil rights laws and [place] laws. We do not discriminate on the basis of race, color, national origin, age, disability, sex, sexual orientation, or gender identity.            Thank you!     Thank you for choosing [facility] DERMATOLOGY  for your care. Our goal is always to provide you with excellent care. Hearing back from our patients is one way we can continue to improve our services. Please take a few minutes to complete the written survey that you may receive in the mail after your visit with us. Thank you!             Your Updated Medication List - Protect others around you: Learn how to safely use, store and throw away your medicines at www.disposemymeds.org.          This  list is accurate as of: 10/16/17 12:49 PM.  Always use your most recent med list.                   Brand Name Dispense Instructions for use Diagnosis    CO Q-10 PO      Take 1 tablet by mouth daily.        KRILL OIL PO      Take  by mouth daily.        MULTIVITAMINS PO      Take  by mouth daily.        SOLUBLE FIBER/PROBIOTICS PO      Take 2 tablets by mouth daily.        TARGRETIN 1 % Gel   Generic drug:  Bexarotene     60 g    APPLY EVERY OTHER NIGHT TO EVERY NIGHT AS DIRECTED    CTCL (cutaneous T-cell lymphoma) (H)       triamcinolone 0.1 % cream    KENALOG    454 g    Use in morning to rash    CTCL (cutaneous T-cell lymphoma) (H)       VITAMIN D-3 PO      Take 1 tablet by mouth daily.

## 2017-10-16 NOTE — PROGRESS NOTES
Lyndsey Hagen comes into clinic today at the request of Dr. Guillory Ordering Provider for UVA1.        This service provided today was under the supervising provider of the day Dr. Vincent, who was available if needed.    Reason for visit: Phototherapy   Patrica Fuentes

## 2017-10-23 ENCOUNTER — OFFICE VISIT (OUTPATIENT)
Dept: DERMATOLOGY | Facility: CLINIC | Age: 67
End: 2017-10-23

## 2017-10-23 DIAGNOSIS — C84.00 MYCOSIS FUNGOIDES, UNSPECIFIED BODY REGION (H): ICD-10-CM

## 2017-10-23 NOTE — NURSING NOTE
Lyndsey Hagen comes into clinic today at the request of Dr. Guillory Ordering Provider for UVA1.    This service provided today was under the supervising provider of the day Dr. Cabezas, who was available if needed.    Reason for visit: UVA1    Stephany Mercer

## 2017-10-23 NOTE — MR AVS SNAPSHOT
After Visit Summary   10/23/2017    Lyndsey Hagen    MRN: [id number]           Patient Information     Date Of Birth          1950        Visit Information        Provider Department      10/23/2017 11:35 AM UC DERM LIGHT TREATMENT [facility] Dermatology        Today's Diagnoses     Mycosis fungoides, unspecified body region (H)           Follow-ups after your visit        Your next 10 appointments already scheduled     Oct 27, 2017  9:30 AM CDT   (Arrive by 9:15 AM)   Derm Light Extended with UC DERM LIGHT TREATMENT   [facility])    [address] [place] [address]   [phone]            Oct 30, 2017  7:35 AM CDT   (Arrive by 7:20 AM)   Derm Light Extended with UC DERM LIGHT TREATMENT   [facility])    [address] [place] [address]   [phone]            Nov 03, 2017  8:05 AM CDT   (Arrive by 7:50 AM)   Derm Light Extended with UC DERM LIGHT TREATMENT   [facility])    [address] [place] [address]   [phone]            Nov 20, 2017  7:30 AM CST   (Arrive by 7:15 AM)   Derm Light Extended with UC DERM LIGHT TREATMENT   [facility])    [address] [place] [address]   [phone]            Nov 27, 2017  7:30 AM CST   (Arrive by 7:15 AM)   Derm Light Extended with UC DERM LIGHT TREATMENT   [facility])    [address] [place] [address]   [phone]            Dec 01, 2017  8:00 AM CST   (Arrive by 7:45 AM)   Derm Light Extended with UC DERM LIGHT TREATMENT   [facility])    [address] [place] [address]   [phone]            Dec 04, 2017  7:30 AM  CST   (Arrive by 7:15 AM)   Derm Light Extended with UC DERM LIGHT TREATMENT   [facility] ([facility])    [address] [address]   [phone]            Dec 08, 2017  8:00 AM CST   (Arrive by 7:45 AM)   Derm Light Extended with UC DERM LIGHT TREATMENT   [facility] ([facility])    [address] [address] [phone]            Dec 11, 2017  7:30 AM CST   (Arrive by 7:15 AM)   Derm Light Extended with UC DERM LIGHT TREATMENT   [facility] ([facility])    [address] [address] [phone]            Dec 18, 2017  7:30 AM CST   (Arrive by 7:15 AM)   Derm Light Extended with UC DERM LIGHT TREATMENT   [facility] ([facility])    [address] [place] [id number] [phone]              Who to contact     Please call your clinic at [phone] to:    Ask questions about your health    Make or cancel appointments    Discuss your medicines    Learn about your test results    Speak to your doctor   If you have compliments or concerns about an experience at your clinic, or if you wish to file a complaint, please contact [facility] Patient Relations at [phone] or email us at [email or web address]         Additional Information About Your Visit        AccountNowhart Information     [employer] gives you secure access to your electronic health record. If you see a primary care provider, you can also send messages to your care team and make appointments. If you have questions, please call your primary care clinic.  If you do not have a primary care provider, please call [phone] and they will assist you.      Install[employer]izer is an electronic gateway that provides easy, online access to your medical records. With InstallMon[employer]izer, you  can request a clinic appointment, read your test results, renew a prescription or communicate with your care team.     To access your existing account, please contact your [facility] or call [phone] for assistance.        Care EveryWhere ID     This is your Care EveryWhere ID. This could be used by other organizations to access your [place] medical records  [id number]         Blood Pressure from Last 3 Encounters:   09/19/17 109/73   02/20/17 118/75   11/21/16 120/75    Weight from Last 3 Encounters:   05/23/16 73.5 kg (162 lb)   04/25/16 72.6 kg (160 lb)   02/22/16 76.1 kg (167 lb 12.8 oz)              We Performed the Following     CHARGE - PHOTOTHERAPY - UVA1     PROCEDURE - PHOTOTHERAPY UVA1        Primary Care Provider Office Phone # Fax #    April Rosalva [phone]       [place] [address]        Equal Access to Services     KARTHIK PAULA : Hadii aad ku hadasho Soomaali, waaxda luqadaha, qaybta kaalmada adeegyada, waxay idiin hayturnern chery miranda . So [facility] [phone].    ATENCIÓN: Si bernardala español, tiene a bustillos disposición servicios gratuitos de asistencia lingüística. Llame al [phone].    We comply with applicable federal civil rights laws and [place] laws. We do not discriminate on the basis of race, color, national origin, age, disability, sex, sexual orientation, or gender identity.            Thank you!     Thank you for choosing [facility] DERMATOLOGY  for your care. Our goal is always to provide you with excellent care. Hearing back from our patients is one way we can continue to improve our services. Please take a few minutes to complete the written survey that you may receive in the mail after your visit with us. Thank you!             Your Updated Medication List - Protect others around you: Learn how to safely use, store and throw away your medicines at www.disposemymeds.org.          This  list is accurate as of: 10/23/17 12:25 PM.  Always use your most recent med list.                   Brand Name Dispense Instructions for use Diagnosis    CO Q-10 PO      Take 1 tablet by mouth daily.        KRILL OIL PO      Take  by mouth daily.        MULTIVITAMINS PO      Take  by mouth daily.        SOLUBLE FIBER/PROBIOTICS PO      Take 2 tablets by mouth daily.        TARGRETIN 1 % Gel   Generic drug:  Bexarotene     60 g    APPLY EVERY OTHER NIGHT TO EVERY NIGHT AS DIRECTED    CTCL (cutaneous T-cell lymphoma) (H)       triamcinolone 0.1 % cream    KENALOG    454 g    Use in morning to rash    CTCL (cutaneous T-cell lymphoma) (H)       VITAMIN D-3 PO      Take 1 tablet by mouth daily.

## 2017-10-23 NOTE — PROGRESS NOTES
[facility] Dermatology Phototherapy Record  1. Lyndsey Hagen is a 67 year old female is here today for phototherapy (UVA1) treatment for Mycosis fungoides, unspecified body region.        Changes or new medications since last treatment:   NO    New medical conditions or problems since last treatment:   NO    Any problems with last phototherapy treatment?    NO    2. The patient tolerated phototherapy without complication    Patient will return for next UVA1 treatment, per protocol.     Patient to see provider every 4-12 weeks for follow-up during treatment.      All questions and concerns discussed with patient in clinic today.      Stephany Mercer

## 2017-10-27 ENCOUNTER — OFFICE VISIT (OUTPATIENT)
Dept: DERMATOLOGY | Facility: CLINIC | Age: 67
End: 2017-10-27

## 2017-10-27 DIAGNOSIS — C84.00 MYCOSIS FUNGOIDES, UNSPECIFIED BODY REGION (H): ICD-10-CM

## 2017-10-27 NOTE — MR AVS SNAPSHOT
After Visit Summary   10/27/2017    Lyndsey Hagen    MRN: [id number]           Patient Information     Date Of Birth          1950        Visit Information        Provider Department      10/27/2017 9:30 AM UC DERM LIGHT TREATMENT [facility] Dermatology        Today's Diagnoses     Mycosis fungoides, unspecified body region (H)           Follow-ups after your visit        Your next 10 appointments already scheduled     Oct 30, 2017  7:35 AM CDT   (Arrive by 7:20 AM)   Derm Light Extended with UC DERM LIGHT TREATMENT   [facility])    [address] [place] [address]   [phone]            Nov 03, 2017  8:05 AM CDT   (Arrive by 7:50 AM)   Derm Light Extended with UC DERM LIGHT TREATMENT   [facility])    [address] [place] [address]   [phone]            Nov 20, 2017  7:30 AM CST   (Arrive by 7:15 AM)   Derm Light Extended with UC DERM LIGHT TREATMENT   [facility])    [address] [place] [address]   [phone]            Nov 27, 2017  7:30 AM CST   (Arrive by 7:15 AM)   Derm Light Extended with UC DERM LIGHT TREATMENT   [facility])    [address] [place] [address]   [phone]            Dec 01, 2017  8:00 AM CST   (Arrive by 7:45 AM)   Derm Light Extended with UC DERM LIGHT TREATMENT   [facility])    [address] [place] [address]   [phone]            Dec 04, 2017  7:30 AM CST   (Arrive by 7:15 AM)   Derm Light Extended with UC DERM LIGHT TREATMENT   [facility])    [address] [place] [address]   [phone]            Dec 08, 2017  8:00 AM CST    (Arrive by 7:45 AM)   Derm Light Extended with UC DERM LIGHT TREATMENT   [facility] ([facility])    [address] [address]   [phone]            Dec 11, 2017  7:30 AM CST   (Arrive by 7:15 AM)   Derm Light Extended with UC DERM LIGHT TREATMENT   [facility] ([facility])    [address] [address] [phone]            Dec 18, 2017  7:30 AM CST   (Arrive by 7:15 AM)   Derm Light Extended with UC DERM LIGHT TREATMENT   [facility] ([facility])    [address] [place] [address] [phone]            Dec 22, 2017  8:00 AM CST   (Arrive by 7:45 AM)   Derm Light Extended with UC DERM LIGHT TREATMENT   [facility] ([facility])    [address] [place] [id number] [phone]              Who to contact     Please call your clinic at [phone] to:    Ask questions about your health    Make or cancel appointments    Discuss your medicines    Learn about your test results    Speak to your doctor   If you have compliments or concerns about an experience at your clinic, or if you wish to file a complaint, please contact [facility] Phys[facility] Patient Relations at [phone] or email us at [email or web address]         Additional Information About Your Visit        NovoPedicshart Information     [employer]Arch[employer] gives you secure access to your electronic health record. If you see a primary care provider, you can also send messages to your care team and make appointments. If you have questions, please call your primary care clinic.  If you do not have a primary care provider, please call [phone] and they will assist you.      [employer]Arch[employer] is an electronic gateway that provides easy, online access to your medical records. With [employer], you can  request a clinic appointment, read your test results, renew a prescription or communicate with your care team.     To access your existing account, please contact your [facility] or call [phone] for assistance.        Care EveryWhere ID     This is your Care EveryWhere ID. This could be used by other organizations to access your [place] medical records  [id number]         Blood Pressure from Last 3 Encounters:   09/19/17 109/73   02/20/17 118/75   11/21/16 120/75    Weight from Last 3 Encounters:   05/23/16 73.5 kg (162 lb)   04/25/16 72.6 kg (160 lb)   02/22/16 76.1 kg (167 lb 12.8 oz)              We Performed the Following     CHARGE - PHOTOTHERAPY - UVA1     PROCEDURE - PHOTOTHERAPY UVA1        Primary Care Provider Office Phone # Fax #    April Rosalva [phone]       [place] [address]        Equal Access to Services     KARTHIK PAULA : Hadii aad ku hadasho Soomaali, waaxda luqadaha, qaybta kaalmada adeegyada, waxay idiin hayturnern chery miranda . So [facility] [phone].    ATENCIÓN: Si gabby herring, tiene a bustillos disposición servicios gratuitos de asistencia lingüística. Llame al [phone].    We comply with applicable federal civil rights laws and [place] laws. We do not discriminate on the basis of race, color, national origin, age, disability, sex, sexual orientation, or gender identity.            Thank you!     Thank you for choosing [facility] DERMATOLOGY  for your care. Our goal is always to provide you with excellent care. Hearing back from our patients is one way we can continue to improve our services. Please take a few minutes to complete the written survey that you may receive in the mail after your visit with us. Thank you!             Your Updated Medication List - Protect others around you: Learn how to safely use, store and throw away your medicines at www.disposemymeds.org.          This list  is accurate as of: 10/27/17 10:12 AM.  Always use your most recent med list.                   Brand Name Dispense Instructions for use Diagnosis    CO Q-10 PO      Take 1 tablet by mouth daily.        KRILL OIL PO      Take  by mouth daily.        MULTIVITAMINS PO      Take  by mouth daily.        SOLUBLE FIBER/PROBIOTICS PO      Take 2 tablets by mouth daily.        TARGRETIN 1 % Gel   Generic drug:  Bexarotene     60 g    APPLY EVERY OTHER NIGHT TO EVERY NIGHT AS DIRECTED    CTCL (cutaneous T-cell lymphoma) (H)       triamcinolone 0.1 % cream    KENALOG    454 g    Use in morning to rash    CTCL (cutaneous T-cell lymphoma) (H)       VITAMIN D-3 PO      Take 1 tablet by mouth daily.

## 2017-10-27 NOTE — NURSING NOTE
[facility] Dermatology Phototherapy Record  1. Lyndsey Hagen is a 67 year old female is here today for phototherapy (UVA1) treatment for Mycosis fungoides, unspecified body region.        Changes or new medications since last treatment:   NO    New medical conditions or problems since last treatment:   NO    Any problems with last phototherapy treatment?    NO    2. The patient tolerated phototherapy without complication    Patient will return for next UVA1 treatment, per protocol.     Patient to see provider every 4-12 weeks for follow-up during treatment.      All questions and concerns discussed with patient in clinic today.      Jordyn Ricci      Lyndsey Hagen comes into clinic today at the request of [place] Ordering Provider for UVA1.      This service provided today was under the supervising provider of the amy Haley, who was available if needed.      Jordyn Ricci

## 2017-10-30 ENCOUNTER — OFFICE VISIT (OUTPATIENT)
Dept: DERMATOLOGY | Facility: CLINIC | Age: 67
End: 2017-10-30

## 2017-10-30 DIAGNOSIS — C84.00 MYCOSIS FUNGOIDES, UNSPECIFIED BODY REGION (H): ICD-10-CM

## 2017-10-30 NOTE — MR AVS SNAPSHOT
After Visit Summary   10/30/2017    Lyndsey Hagen    MRN: [id number]           Patient Information     Date Of Birth          1950        Visit Information        Provider Department      10/30/2017 7:35 AM UC DERM LIGHT TREATMENT [facility] Dermatology        Today's Diagnoses     Mycosis fungoides, unspecified body region (H)           Follow-ups after your visit        Your next 10 appointments already scheduled     Nov 03, 2017  8:05 AM CDT   (Arrive by 7:50 AM)   Derm Light Extended with UC DERM LIGHT TREATMENT   [facility])    [address] [place] [address]   [phone]            Nov 20, 2017  7:30 AM CST   (Arrive by 7:15 AM)   Derm Light Extended with UC DERM LIGHT TREATMENT   [facility])    [address] [place] [address]   [phone]            Nov 27, 2017  7:30 AM CST   (Arrive by 7:15 AM)   Derm Light Extended with UC DERM LIGHT TREATMENT   [facility])    [address] [place] [address]   [phone]            Dec 01, 2017  8:00 AM CST   (Arrive by 7:45 AM)   Derm Light Extended with UC DERM LIGHT TREATMENT   [facility])    [address] [place] [address]   [phone]            Dec 04, 2017  7:30 AM CST   (Arrive by 7:15 AM)   Derm Light Extended with UC DERM LIGHT TREATMENT   [facility])    [address] [place] [address]   [phone]            Dec 08, 2017  8:00 AM CST   (Arrive by 7:45 AM)   Derm Light Extended with UC DERM LIGHT TREATMENT   [facility])    [address] [place] [address]   [phone]            Dec 11, 2017  7:30 AM CST    (Arrive by 7:15 AM)   Derm Light Extended with UC DERM LIGHT TREATMENT   [facility] ([facility])    [address] [address]   [phone]            Dec 18, 2017  7:30 AM CST   (Arrive by 7:15 AM)   Derm Light Extended with UC DERM LIGHT TREATMENT   [facility] ([facility])    [address] [place] [address]   [phone]            Dec 22, 2017  8:00 AM CST   (Arrive by 7:45 AM)   Derm Light Extended with UC DERM LIGHT TREATMENT   [facility] ([facility])    [address] [place] [address] [phone]            Dec 27, 2017 11:30 AM CST   (Arrive by 11:15 AM)   Return T-Cell Visit with Arlen Guillory MD   [facility] ([facility])    [address] [place] [id number] [phone]              Who to contact     Please call your clinic at [phone] to:    Ask questions about your health    Make or cancel appointments    Discuss your medicines    Learn about your test results    Speak to your doctor   If you have compliments or concerns about an experience at your clinic, or if you wish to file a complaint, please contact [facility] Patient Relations at [phone] or email us at [email or web address]         Additional Information About Your Visit        [employer] Information     [employer] gives you secure access to your electronic health record. If you see a primary care provider, you can also send messages to your care team and make appointments. If you have questions, please call your primary care clinic.  If you do not have a primary care provider, please call [phone] and they will assist you.      [employer] is an electronic gateway that provides easy, online access to your medical records. With [employer], you  can request a clinic appointment, read your test results, renew a prescription or communicate with your care team.     To access your existing account, please contact your [facility] or call [phone] for assistance.        Care EveryWhere ID     This is your Care EveryWhere ID. This could be used by other organizations to access your [place] medical records  [id number]         Blood Pressure from Last 3 Encounters:   09/19/17 109/73   02/20/17 118/75   11/21/16 120/75    Weight from Last 3 Encounters:   05/23/16 73.5 kg (162 lb)   04/25/16 72.6 kg (160 lb)   02/22/16 76.1 kg (167 lb 12.8 oz)              We Performed the Following     CHARGE - PHOTOTHERAPY - UVA1     PROCEDURE - PHOTOTHERAPY UVA1     PROCEDURE - PHOTOTHERAPY UVA1        Primary Care Provider Office Phone # Fax #    April Rosalva [phone]       [place] [address]        Equal Access to Services     KARTHIK PAULA : Hadii aad ku hadasho Soomaali, waaxda luqadaha, qaybta kaalmada adeegyada, waxay idiin hayaan adeeg khrocky lasadiq . So [facility] [phone].    ATENCIÓN: Si habla español, tiene a bustillos disposición servicios gratuitos de asistencia lingüística. Aden al [phone].    We comply with applicable federal civil rights laws and [place] laws. We do not discriminate on the basis of race, color, national origin, age, disability, sex, sexual orientation, or gender identity.            Thank you!     Thank you for choosing [facility] DERMATOLOGY  for your care. Our goal is always to provide you with excellent care. Hearing back from our patients is one way we can continue to improve our services. Please take a few minutes to complete the written survey that you may receive in the mail after your visit with us. Thank you!             Your Updated Medication List - Protect others around you: Learn how to safely use, store and throw away your medicines at  www.disposemymeds.org.          This list is accurate as of: 10/30/17  2:24 PM.  Always use your most recent med list.                   Brand Name Dispense Instructions for use Diagnosis    CO Q-10 PO      Take 1 tablet by mouth daily.        KRILL OIL PO      Take  by mouth daily.        MULTIVITAMINS PO      Take  by mouth daily.        SOLUBLE FIBER/PROBIOTICS PO      Take 2 tablets by mouth daily.        TARGRETIN 1 % Gel   Generic drug:  Bexarotene     60 g    APPLY EVERY OTHER NIGHT TO EVERY NIGHT AS DIRECTED    CTCL (cutaneous T-cell lymphoma) (H)       triamcinolone 0.1 % cream    KENALOG    454 g    Use in morning to rash    CTCL (cutaneous T-cell lymphoma) (H)       VITAMIN D-3 PO      Take 1 tablet by mouth daily.

## 2017-10-30 NOTE — NURSING NOTE
[facility] Dermatology Phototherapy Record  1. Lyndsey Hagen is a 67 year old female is here today for phototherapy (UVA1) treatment for Mycosis fungoides, unspecified body region.        Changes or new medications since last treatment:   NO    New medical conditions or problems since last treatment:   NO    Any problems with last phototherapy treatment?    NO    2. The patient tolerated phototherapy without complication    Patient will return for next UVA1 treatment, per protocol.     Patient to see provider every 4-12 weeks for follow-up during treatment.      All questions and concerns discussed with patient in clinic today.      Jordyn Ricci      Lyndsey Hagen comes into clinic today at the request of [place] Ordering Provider for UVA1.      This service provided today was under the supervising provider of the amy Guillory, who was available if needed.      Jordyn Ricci

## 2017-10-30 NOTE — PROGRESS NOTES
[facility] Dermatology Phototherapy Record  1. Lyndsey Hagen is a 67 year old female is here today for phototherapy (UVB) treatment for Mycosis fungoides, unspecified body region.        Changes or new medications since last treatment:   NO    New medical conditions or problems since last treatment:   NO    Any problems with last phototherapy treatment?    NO    2. The patient tolerated phototherapy without complication    Patient will return for next UVB treatment, per protocol.     Patient to see provider every 4-12 weeks for follow-up during treatment.      All questions and concerns discussed with patient in clinic today.      Pooja Jordan RN

## 2017-11-03 ENCOUNTER — OFFICE VISIT (OUTPATIENT)
Dept: DERMATOLOGY | Facility: CLINIC | Age: 67
End: 2017-11-03

## 2017-11-03 DIAGNOSIS — C84.00 MYCOSIS FUNGOIDES, UNSPECIFIED BODY REGION (H): ICD-10-CM

## 2017-11-03 NOTE — NURSING NOTE
Lyndsey Hagen comes into clinic today at the request of Dr Guillory Ordering Provider for UVA-1.    This service provided today was under the supervising provider of the day Dr Merlos, who was available if needed.    Reason for visit: Phototherapy    Pooja Jordan RN

## 2017-11-03 NOTE — PROGRESS NOTES
[facility] Dermatology Phototherapy Record  1. Lyndsey Hagen is a 67 year old female is here today for phototherapy (UVA-1) treatment for Mycosis fungoides, unspecified body region.        Changes or new medications since last treatment:   NO    New medical conditions or problems since last treatment:   NO    Any problems with last phototherapy treatment?    NO    2. The patient tolerated phototherapy without complication    Patient will return for next UVA treatment, per protocol.     Patient to see provider every 4-12 weeks for follow-up during treatment.      All questions and concerns discussed with patient in clinic today.      Pooja Jordan RN

## 2017-11-03 NOTE — MR AVS SNAPSHOT
After Visit Summary   11/3/2017    Lyndsey Hagen    MRN: [id number]           Patient Information     Date Of Birth          1950        Visit Information        Provider Department      11/3/2017 8:05 AM UC DERM LIGHT TREATMENT [facility] Dermatology        Today's Diagnoses     Mycosis fungoides, unspecified body region (H)           Follow-ups after your visit        Your next 10 appointments already scheduled     Nov 20, 2017  7:30 AM CST   (Arrive by 7:15 AM)   Derm Light Extended with UC DERM LIGHT TREATMENT   [facility])    [address] [address]   [phone]            Nov 27, 2017  7:30 AM CST   (Arrive by 7:15 AM)   Derm Light Extended with UC DERM LIGHT TREATMENT   [facility])    [address] [place] [address]   [phone]            Dec 01, 2017  8:00 AM CST   (Arrive by 7:45 AM)   Derm Light Extended with UC DERM LIGHT TREATMENT   [facility])    [address] [place] [address]   [phone]            Dec 04, 2017  7:30 AM CST   (Arrive by 7:15 AM)   Derm Light Extended with UC DERM LIGHT TREATMENT   [facility])    [address] [place] [address]   [phone]            Dec 08, 2017  8:00 AM CST   (Arrive by 7:45 AM)   Derm Light Extended with UC DERM LIGHT TREATMENT   [facility])    [address] [place] [address]   [phone]            Dec 11, 2017  7:30 AM CST   (Arrive by 7:15 AM)   Derm Light Extended with UC DERM LIGHT TREATMENT   [facility])    [address] [place] [address]   [phone]            Dec 18, 2017  7:30 AM CST    (Arrive by 7:15 AM)   Derm Light Extended with UC DERM LIGHT TREATMENT   [facility] Dermatology ([facility])    [address] [phone] [phone]            Dec 22, 2017  8:00 AM CST   (Arrive by 7:45 AM)   Derm Light Extended with UC DERM LIGHT TREATMENT   [facility] ([facility])    [address] [address] [phone]            Dec 27, 2017 11:30 AM CST   (Arrive by 11:15 AM)   Return T-Cell Visit with Arlen Guillory MD   [facility] Dermatology ([facility])    [address] [place] [address] [phone]              Who to contact     Please call your clinic at [phone] to:    Ask questions about your health    Make or cancel appointments    Discuss your medicines    Learn about your test results    Speak to your doctor   If you have compliments or concerns about an experience at your clinic, or if you wish to file a complaint, please contact [facility] Patient Relations at [phone] or email us at [email or web address]         Additional Information About Your Visit        [employer] Information     [employer] gives you secure access to your electronic health record. If you see a primary care provider, you can also send messages to your care team and make appointments. If you have questions, please call your primary care clinic.  If you do not have a primary care provider, please call [phone] and they will assist you.      [employer] is an electronic gateway that provides easy, online access to your medical records. With [employer], you can request a clinic appointment, read your test results, renew a prescription or communicate with your care team.     To access your existing account, please contact your [facility] or call [phone] for  assistance.        Care EveryWhere ID     This is your Care EveryWhere ID. This could be used by other organizations to access your [place] medical records  [id number]         Blood Pressure from Last 3 Encounters:   09/19/17 109/73   02/20/17 118/75   11/21/16 120/75    Weight from Last 3 Encounters:   05/23/16 73.5 kg (162 lb)   04/25/16 72.6 kg (160 lb)   02/22/16 76.1 kg (167 lb 12.8 oz)              We Performed the Following     CHARGE - PHOTOTHERAPY - UVA1     PROCEDURE - PHOTOTHERAPY UVA1        Primary Care Provider Office Phone # Fax #    April Rosalva [phone]       [facility] [address] [place] [address]        Equal Access to Services     KARTHIK PAULA : Hadii aad ku hadasho Soomaali, waaxda luqadaha, qaybta kaalmada adeegyada, manuel miranda . So [facility] [phone].    ATENCIÓN: Si habla español, tiene a bustillos disposición servicios gratuitos de asistencia lingüística. Llame al [phone].    We comply with applicable federal civil rights laws and [place] laws. We do not discriminate on the basis of race, color, national origin, age, disability, sex, sexual orientation, or gender identity.            Thank you!     Thank you for choosing [facility] DERMATOLOGY  for your care. Our goal is always to provide you with excellent care. Hearing back from our patients is one way we can continue to improve our services. Please take a few minutes to complete the written survey that you may receive in the mail after your visit with us. Thank you!             Your Updated Medication List - Protect others around you: Learn how to safely use, store and throw away your medicines at www.disposemymeds.org.          This list is accurate as of: 11/3/17  8:30 AM.  Always use your most recent med list.                   Brand Name Dispense Instructions for use Diagnosis    CO Q-10 PO      Take 1 tablet by mouth daily.        KRILL OIL PO      Take  by mouth  daily.        MULTIVITAMINS PO      Take  by mouth daily.        SOLUBLE FIBER/PROBIOTICS PO      Take 2 tablets by mouth daily.        TARGRETIN 1 % Gel   Generic drug:  Bexarotene     60 g    APPLY EVERY OTHER NIGHT TO EVERY NIGHT AS DIRECTED    CTCL (cutaneous T-cell lymphoma) (H)       triamcinolone 0.1 % cream    KENALOG    454 g    Use in morning to rash    CTCL (cutaneous T-cell lymphoma) (H)       VITAMIN D-3 PO      Take 1 tablet by mouth daily.

## 2017-11-20 ENCOUNTER — OFFICE VISIT (OUTPATIENT)
Dept: DERMATOLOGY | Facility: CLINIC | Age: 67
End: 2017-11-20

## 2017-11-20 DIAGNOSIS — C84.00 MYCOSIS FUNGOIDES, UNSPECIFIED BODY REGION (H): ICD-10-CM

## 2017-11-20 NOTE — PROGRESS NOTES
[facility] Dermatology Phototherapy Record  1. Lyndsey Hagen is a 67 year old female is here today for phototherapy (UVA1) treatment forMycosis fungoides, unspecified body region .        Changes or new medications since last treatment:   NO    New medical conditions or problems since last treatment:   NO    Any problems with last phototherapy treatment?    NO    2. The patient tolerated phototherapy without complication    Patient will return for next UVA1 treatment, per protocol.     Patient to see provider every 4-12 weeks for follow-up during treatment.      All questions and concerns discussed with patient in clinic today.      Stephany Mercer

## 2017-11-20 NOTE — MR AVS SNAPSHOT
After Visit Summary   11/20/2017    Lyndsey Hagen    MRN: [id number]           Patient Information     Date Of Birth          1950        Visit Information        Provider Department      11/20/2017 7:30 AM UC DERM LIGHT TREATMENT [facility] Dermatology        Today's Diagnoses     Mycosis fungoides, unspecified body region (H)           Follow-ups after your visit        Your next 10 appointments already scheduled     Nov 27, 2017  7:30 AM CST   (Arrive by 7:15 AM)   Derm Light Extended with UC DERM LIGHT TREATMENT   [facility])    [address] [place] [address]   [phone]            Dec 01, 2017  8:00 AM CST   (Arrive by 7:45 AM)   Derm Light Extended with UC DERM LIGHT TREATMENT   [facility])    [address] [place] [address]   [phone]            Dec 04, 2017  7:30 AM CST   (Arrive by 7:15 AM)   Derm Light Extended with UC DERM LIGHT TREATMENT   [facility])    [address] [place] [address]   [phone]            Dec 08, 2017  8:00 AM CST   (Arrive by 7:45 AM)   Derm Light Extended with UC DERM LIGHT TREATMENT   [facility])    [address] [place] [address]   [phone]            Dec 11, 2017  7:30 AM CST   (Arrive by 7:15 AM)   Derm Light Extended with UC DERM LIGHT TREATMENT   [facility])    [address] [place] [address]   [phone]            Dec 18, 2017  7:30 AM CST   (Arrive by 7:15 AM)   Derm Light Extended with UC DERM LIGHT TREATMENT   [facility])    [address] [place] [address]   [phone]            Dec 22, 2017  8:00 AM CST    (Arrive by 7:45 AM)   Derm Light Extended with  DERM LIGHT TREATMENT   [facility] D[facility] ([facility])    [address] [facility]  [address] [facility] [phone] [phone]            Dec 27, 2017 11:30 AM CST   (Arrive by 11:15 AM)   Return T-Cell Visit with Arlen Guillory MD   [facility] Dermatology ([facility])    [address] [phone] [phone]              Who to contact     Please call your clinic at [phone] to:    Ask questions about your health    Make or cancel appointments    Discuss your medicines    Learn about your test results    Speak to your doctor   If you have compliments or concerns about an experience at your clinic, or if you wish to file a complaint, please contact [facility] Patient Relations at [phone] or email us at [email or web address]         Additional Information About Your Visit        [employer] Information     [employer] gives you secure access to your electronic health record. If you see a primary care provider, you can also send messages to your care team and make appointments. If you have questions, please call your primary care clinic.  If you do not have a primary care provider, please call [phone] and they will assist you.      [employer] is an electronic gateway that provides easy, online access to your medical records. With [employer], you can request a clinic appointment, read your test results, renew a prescription or communicate with your care team.     To access your existing account, please contact your [facility] or call [phone] for assistance.        Care EveryWhere ID     This is your Care EveryWhere ID. This could be used by other organizations to access your [place] medical records  [id number]         Blood Pressure from Last 3 Encounters:   09/19/17 109/73   02/20/17 118/75    11/21/16 120/75    Weight from Last 3 Encounters:   05/23/16 73.5 kg (162 lb)   04/25/16 72.6 kg (160 lb)   02/22/16 76.1 kg (167 lb 12.8 oz)              We Performed the Following     CHARGE - PHOTOTHERAPY - UVA1     PROCEDURE - PHOTOTHERAPY UVA1        Primary Care Provider Office Phone # Fax #    April Rosalva [phone]       [facility] [address] [place] [address]        Equal Access to Services     KARTHIK PAULA : Hadii aad ku hadasho Soomaali, waaxda luqadaha, qaybta kaalmada adeegyada, waxay idiin hayaan chery miranda . So [facility] [phone].    ATENCIÓN: Si habla español, tiene a bustillos disposición servicios gratuitos de asistencia lingüística. LlSt. [facility] Hospital [phone].    We comply with applicable federal civil rights laws and [place] laws. We do not discriminate on the basis of race, color, national origin, age, disability, sex, sexual orientation, or gender identity.            Thank you!     Thank you for choosing [facility] DERMATOLOGY  for your care. Our goal is always to provide you with excellent care. Hearing back from our patients is one way we can continue to improve our services. Please take a few minutes to complete the written survey that you may receive in the mail after your visit with us. Thank you!             Your Updated Medication List - Protect others around you: Learn how to safely use, store and throw away your medicines at www.disposemymeds.org.          This list is accurate as of: 11/20/17  9:46 AM.  Always use your most recent med list.                   Brand Name Dispense Instructions for use Diagnosis    CO Q-10 PO      Take 1 tablet by mouth daily.        KRILL OIL PO      Take  by mouth daily.        MULTIVITAMINS PO      Take  by mouth daily.        SOLUBLE FIBER/PROBIOTICS PO      Take 2 tablets by mouth daily.        TARGRETIN 1 % Gel   Generic drug:  Bexarotene     60 g    APPLY EVERY OTHER NIGHT TO EVERY NIGHT AS DIRECTED    CTCL  (cutaneous T-cell lymphoma) (H)       triamcinolone 0.1 % cream    KENALOG    454 g    Use in morning to rash    CTCL (cutaneous T-cell lymphoma) (H)       VITAMIN D-3 PO      Take 1 tablet by mouth daily.

## 2017-11-20 NOTE — NURSING NOTE
Lyndsey Hagen comes into clinic today at the request of Dr. Guillory Ordering Provider for UVA1.    This service provided today was under the supervising provider of the day Dr. Vincent, who was available if needed.    Reason for visit: UVA1    Stephany Mercer

## 2017-12-01 ENCOUNTER — OFFICE VISIT (OUTPATIENT)
Dept: DERMATOLOGY | Facility: CLINIC | Age: 67
End: 2017-12-01

## 2017-12-01 DIAGNOSIS — C84.00 MYCOSIS FUNGOIDES, UNSPECIFIED BODY REGION (H): ICD-10-CM

## 2017-12-01 NOTE — NURSING NOTE
Lyndsey Hagen comes into clinic today at the request of Dr Guillory Ordering Provider for UVA-1    This service provided today was under the supervising provider of the day Dr Moon, who was available if needed.    Pooja Jordan RN

## 2017-12-01 NOTE — MR AVS SNAPSHOT
After Visit Summary   12/1/2017    Lyndsey Hagen    MRN: [id number]           Patient Information     Date Of Birth          1950        Visit Information        Provider Department      12/1/2017 8:00 AM UC DERM LIGHT TREATMENT [facility] Dermatology        Today's Diagnoses     Mycosis fungoides, unspecified body region (H)           Follow-ups after your visit        Your next 10 appointments already scheduled     Dec 04, 2017  7:30 AM CST   (Arrive by 7:15 AM)   Derm Light Extended with UC DERM LIGHT TREATMENT   [facility])    [address] [place] [address]   [phone]            Dec 08, 2017  8:00 AM CST   (Arrive by 7:45 AM)   Derm Light Extended with UC DERM LIGHT TREATMENT   [facility])    [address] [place] [address]   [phone]            Dec 11, 2017  7:30 AM CST   (Arrive by 7:15 AM)   Derm Light Extended with UC DERM LIGHT TREATMENT   [facility])    [address] [place] [address]   [phone]            Dec 18, 2017  7:30 AM CST   (Arrive by 7:15 AM)   Derm Light Extended with UC DERM LIGHT TREATMENT   [facility])    [address] [place] [address]   [phone]            Dec 22, 2017  8:00 AM CST   (Arrive by 7:45 AM)   Derm Light Extended with UC DERM LIGHT TREATMENT   [facility])    [address] [place] [address]   [phone]            Dec 27, 2017 11:30 AM CST   (Arrive by 11:15 AM)   Return T-Cell Visit with Arlen Guillory MD   [facility])    [address] [place] [address]   [phone]              Who to contact      Please call your clinic at [phone] to:    Ask questions about your health    Make or cancel appointments    Discuss your medicines    Learn about your test results    Speak to your doctor   If you have compliments or concerns about an experience at your clinic, or if you wish to file a complaint, please contact [facility] Patient Relations at [phone] or email us at [email or web address]         Additional Information About Your Visit        [employer] Information     IvyDatet gives you secure access to your electronic health record. If you see a primary care provider, you can also send messages to your care team and make appointments. If you have questions, please call your primary care clinic.  If you do not have a primary care provider, please call [phone] and they will assist you.      Combined Effort is an electronic gateway that provides easy, online access to your medical records. With Combined [employer], you can request a clinic appointment, read your test results, renew a prescription or communicate with your care team.     To access your existing account, please contact your [facility] or call [phone] for assistance.        Care EveryWhere ID     This is your Care EveryWhere ID. This could be used by other organizations to access your [place] medical records  [id number]         Blood Pressure from Last 3 Encounters:   09/19/17 109/73   02/20/17 118/75   11/21/16 120/75    Weight from Last 3 Encounters:   05/23/16 73.5 kg (162 lb)   04/25/16 72.6 kg (160 lb)   02/22/16 76.1 kg (167 lb 12.8 oz)              We Performed the Following     CHARGE - PHOTOTHERAPY - UVA1     PROCEDURE - PHOTOTHERAPY UVA1        Primary Care Provider Office Phone # Fax #    April Rosalva [phone]       Formerly [facility] [address] [place] [address]        Equal Access to Services     KARTHIK PAULA AH: Cole Capellan,  anaidda марина, niru kaadrian ozuna, manuel jimenezjair ah. So [facility] [phone].    ATENCIÓN: Si gabby herring, tiene a bustillos disposición servicios gratuitos de asistencia lingüística. Aden al [phone].    We comply with applicable federal civil rights laws and [place] laws. We do not discriminate on the basis of race, color, national origin, age, disability, sex, sexual orientation, or gender identity.            Thank you!     Thank you for choosing [facility] DERMATOLOGY  for your care. Our goal is always to provide you with excellent care. Hearing back from our patients is one way we can continue to improve our services. Please take a few minutes to complete the written survey that you may receive in the mail after your visit with us. Thank you!             Your Updated Medication List - Protect others around you: Learn how to safely use, store and throw away your medicines at www.disposemymeds.org.          This list is accurate as of: 12/1/17  8:47 AM.  Always use your most recent med list.                   Brand Name Dispense Instructions for use Diagnosis    CO Q-10 PO      Take 1 tablet by mouth daily.        KRILL OIL PO      Take  by mouth daily.        MULTIVITAMINS PO      Take  by mouth daily.        SOLUBLE FIBER/PROBIOTICS PO      Take 2 tablets by mouth daily.        TARGRETIN 1 % Gel   Generic drug:  Bexarotene     60 g    APPLY EVERY OTHER NIGHT TO EVERY NIGHT AS DIRECTED    CTCL (cutaneous T-cell lymphoma) (H)       triamcinolone 0.1 % cream    KENALOG    454 g    Use in morning to rash    CTCL (cutaneous T-cell lymphoma) (H)       VITAMIN D-3 PO      Take 1 tablet by mouth daily.

## 2017-12-01 NOTE — PROGRESS NOTES
[facility] Dermatology Phototherapy Record  1. Lyndsey Hagen is a 67 year old female is here today for phototherapy (UVA-1) treatment for Mycosis fungoides, unspecified body region .        Changes or new medications since last treatment:   NO    New medical conditions or problems since last treatment:   NO    Any problems with last phototherapy treatment?    NO    2. The patient tolerated phototherapy without complication    Patient will return  for next UVA treatment, per protocol.     Patient to see provider every 4-12 weeks for follow-up during treatment.      All questions and concerns discussed with patient in clinic today.      Pooja Jordan RN

## 2017-12-04 ENCOUNTER — OFFICE VISIT (OUTPATIENT)
Dept: DERMATOLOGY | Facility: CLINIC | Age: 67
End: 2017-12-04

## 2017-12-04 DIAGNOSIS — C84.00 MYCOSIS FUNGOIDES, UNSPECIFIED BODY REGION (H): ICD-10-CM

## 2017-12-04 NOTE — MR AVS SNAPSHOT
After Visit Summary   12/4/2017    Lyndsey Hagen    MRN: [id number]           Patient Information     Date Of Birth          1950        Visit Information        Provider Department      12/4/2017 7:30 AM UC DERM LIGHT TREATMENT [facility] Dermatology        Today's Diagnoses     Mycosis fungoides, unspecified body region (H)           Follow-ups after your visit        Your next 10 appointments already scheduled     Dec 08, 2017  8:00 AM CST   (Arrive by 7:45 AM)   Derm Light Extended with UC DERM LIGHT TREATMENT   [facility] ([facility])    [address] [place] [address]   [phone]            Dec 11, 2017  7:30 AM CST   (Arrive by 7:15 AM)   Derm Light Extended with UC DERM LIGHT TREATMENT   [facility] ([facility])    [address] [place] [address]   [phone]            Dec 18, 2017  7:30 AM CST   (Arrive by 7:15 AM)   Derm Light Extended with UC DERM LIGHT TREATMENT   [facility] ([facility])    [address] [place] [address]   [phone]            Dec 22, 2017  8:00 AM CST   (Arrive by 7:45 AM)   Derm Light Extended with UC DERM LIGHT TREATMENT   [facility] ([facility])    [address] [place] [address]   [phone]            Dec 27, 2017 11:30 AM CST   (Arrive by 11:15 AM)   Return T-Cell Visit with Arlen Guillory MD   [facility])    [address] [place] [address] [phone]              Who to contact     Please call your clinic at [phone] to:    Ask questions about your health    Make or cancel appointments    Discuss your medicines    Learn about your test results    Speak to your doctor   If you have compliments or concerns about an experience  at your clinic, or if you wish to file a complaint, please contact [facility] Patient Relations at [phone] or email us at [email or web address]         Additional Information About Your Visit        [employer] Information     Firefly Mobilet gives you secure access to your electronic health record. If you see a primary care provider, you can also send messages to your care team and make appointments. If you have questions, please call your primary care clinic.  If you do not have a primary care provider, please call [phone] and they will assist you.      [email or web address] is an electronic gateway that provides easy, online access to your medical records. With [email or web address], you can request a clinic appointment, read your test results, renew a prescription or communicate with your care team.     To access your existing account, please contact your [facility] or call [phone] for assistance.        Care EveryWhere ID     This is your Care EveryWhere ID. This could be used by other organizations to access your [place] medical records  [id number]         Blood Pressure from Last 3 Encounters:   09/19/17 109/73   02/20/17 118/75   11/21/16 120/75    Weight from Last 3 Encounters:   05/23/16 73.5 kg (162 lb)   04/25/16 72.6 kg (160 lb)   02/22/16 76.1 kg (167 lb 12.8 oz)              We Performed the Following     CHARGE - PHOTOTHERAPY - UVA1     PROCEDURE - PHOTOTHERAPY UVA1        Primary Care Provider Office Phone # Fax #    April Rosalva [phone]       [address] [address]        Equal Access to Services     KARTHIK PAULA : Hadii aad ku hadasho Soomaali, waaxda luqadaha, qaybta kaalmada adeegyada, manuel valentin. So [facility] [phone].    ATENCIÓN: Si habla español, tiene a bustillos disposición servicios gratuitos de asistencia lingüística. Llame al [phone].    We comply with  applicable federal civil rights laws and [place] laws. We do not discriminate on the basis of race, color, national origin, age, disability, sex, sexual orientation, or gender identity.            Thank you!     Thank you for choosing [facility] DERMATOLOGY  for your care. Our goal is always to provide you with excellent care. Hearing back from our patients is one way we can continue to improve our services. Please take a few minutes to complete the written survey that you may receive in the mail after your visit with us. Thank you!             Your Updated Medication List - Protect others around you: Learn how to safely use, store and throw away your medicines at www.disposemymeds.org.          This list is accurate as of: 12/4/17  8:03 AM.  Always use your most recent med list.                   Brand Name Dispense Instructions for use Diagnosis    CO Q-10 PO      Take 1 tablet by mouth daily.        KRILL OIL PO      Take  by mouth daily.        MULTIVITAMINS PO      Take  by mouth daily.        SOLUBLE FIBER/PROBIOTICS PO      Take 2 tablets by mouth daily.        TARGRETIN 1 % Gel   Generic drug:  Bexarotene     60 g    APPLY EVERY OTHER NIGHT TO EVERY NIGHT AS DIRECTED    CTCL (cutaneous T-cell lymphoma) (H)       triamcinolone 0.1 % cream    KENALOG    454 g    Use in morning to rash    CTCL (cutaneous T-cell lymphoma) (H)       VITAMIN D-3 PO      Take 1 tablet by mouth daily.

## 2017-12-04 NOTE — NURSING NOTE
Lyndsey Hagen comes into clinic today at the request of Dr. Guillory Ordering Provider for UVA1    This service provided today was under the supervising provider of the day Dr. Vincent, who was available if needed.    Stephany Mercer

## 2017-12-04 NOTE — PROGRESS NOTES
[facility] Dermatology Phototherapy Record  1. Lyndsey Hagen is a 67 year old female is here today for phototherapy (UVA1) treatment forMycosis fungoides, unspecified body region .        Changes or new medications since last treatment:   NO    New medical conditions or problems since last treatment:   NO    Any problems with last phototherapy treatment?    NO    2. The patient tolerated phototherapy without complication    Patient will return for next UVA1 treatment, per protocol.     Patient to see provider every 4-12 weeks for follow-up during treatment.      All questions and concerns discussed with patient in clinic today.      Stephany Mercer

## 2017-12-08 ENCOUNTER — OFFICE VISIT (OUTPATIENT)
Dept: DERMATOLOGY | Facility: CLINIC | Age: 67
End: 2017-12-08

## 2017-12-08 DIAGNOSIS — C84.00 MYCOSIS FUNGOIDES, UNSPECIFIED BODY REGION (H): ICD-10-CM

## 2017-12-08 NOTE — PROGRESS NOTES
[facility] Dermatology Phototherapy Record  1. Lyndsey Hagen is a 67 year old female is here today for phototherapy (UVA-1) treatment for Mycosis fungoides, unspecified body region.        Changes or new medications since last treatment:   NO    New medical conditions or problems since last treatment:   NO    Any problems with last phototherapy treatment?    NO    2. The patient tolerated phototherapy without complication    Patient will return for next UVA-1 treatment, per protocol.     Patient to see provider every 4-12 weeks for follow-up during treatment.      All questions and concerns discussed with patient in clinic today.      Pooja Jordan RN

## 2017-12-08 NOTE — MR AVS SNAPSHOT
After Visit Summary   12/8/2017    Lyndsey Hagen    MRN: [id number]           Patient Information     Date Of Birth          1950        Visit Information        Provider Department      12/8/2017 8:00 AM UC DERM LIGHT TREATMENT [facility] Dermatology        Today's Diagnoses     Mycosis fungoides, unspecified body region (H)           Follow-ups after your visit        Your next 10 appointments already scheduled     Dec 11, 2017  7:30 AM CST   (Arrive by 7:15 AM)   Derm Light Extended with UC DERM LIGHT TREATMENT   [facility] Dermatology ([facility])    [address] [place] [address] [phone]            Dec 18, 2017  7:30 AM CST   (Arrive by 7:15 AM)   Derm Light Extended with UC DERM LIGHT TREATMENT   [facility] ([facility])    [address] [place] [address] [phone]            Dec 22, 2017  8:00 AM CST   (Arrive by 7:45 AM)   Derm Light Extended with UC DERM LIGHT TREATMENT   [facility])    [address] [place] [address] [phone]            Dec 27, 2017 11:30 AM CST   (Arrive by 11:15 AM)   Return T-Cell Visit with Arlen Guillory MD   [facility])    [address] [place] [id number] [phone]              Who to contact     Please call your clinic at [phone] to:    Ask questions about your health    Make or cancel appointments    Discuss your medicines    Learn about your test results    Speak to your doctor   If you have compliments or concerns about an experience at your clinic, or if you wish to file a complaint, please contact [facility] Physicians Patient Relations at [phone] or email us at [email or web address]         Additional Information About Your Visit        MyChart  Information     Purveyour gives you secure access to your electronic health record. If you see a primary care provider, you can also send messages to your care team and make appointments. If you have questions, please call your primary care clinic.  If you do not have a primary care provider, please call [phone] and they will assist you.      Purveyour is an electronic gateway that provides easy, online access to your medical records. With Pur[employer], you can request a clinic appointment, read your test results, renew a prescription or communicate with your care team.     To access your existing account, please contact your [facility] Physicians Clinic or call [phone] for assistance.        Care EveryWhere ID     This is your Care EveryWhere ID. This could be used by other organizations to access your [place] medical records  [id number]         Blood Pressure from Last 3 Encounters:   09/19/17 109/73   02/20/17 118/75   11/21/16 120/75    Weight from Last 3 Encounters:   05/23/16 73.5 kg (162 lb)   04/25/16 72.6 kg (160 lb)   02/22/16 76.1 kg (167 lb 12.8 oz)              We Performed the Following     PROCEDURE - PHOTOTHERAPY UVA1        Primary Care Provider Office Phone # Fax #    April Rosalva [phone]       [address] [address]        Equal Access to Services     KARTHIK PAULA : Hadii aad ku hadasho Soomaali, waaxda luqadaha, qaybta kaalmada adeegyada, waxay tr valentin. So [facility] [phone].    ATENCIÓN: Si habla español, tiene a bustillos disposición servicios gratuitos de asistencia lingüística. Aden al [phone].    We comply with applicable federal civil rights laws and [place] laws. We do not discriminate on the basis of race, color, national origin, age, disability, sex, sexual orientation, or gender identity.            Thank you!     Thank you for choosing [facility]  for your care. Our goal is  always to provide you with excellent care. Hearing back from our patients is one way we can continue to improve our services. Please take a few minutes to complete the written survey that you may receive in the mail after your visit with us. Thank you!             Your Updated Medication List - Protect others around you: Learn how to safely use, store and throw away your medicines at www.disposemymeds.org.          This list is accurate as of: 12/8/17  8:43 AM.  Always use your most recent med list.                   Brand Name Dispense Instructions for use Diagnosis    CO Q-10 PO      Take 1 tablet by mouth daily.        KRILL OIL PO      Take  by mouth daily.        MULTIVITAMINS PO      Take  by mouth daily.        SOLUBLE FIBER/PROBIOTICS PO      Take 2 tablets by mouth daily.        TARGRETIN 1 % Gel   Generic drug:  Bexarotene     60 g    APPLY EVERY OTHER NIGHT TO EVERY NIGHT AS DIRECTED    CTCL (cutaneous T-cell lymphoma) (H)       triamcinolone 0.1 % cream    KENALOG    454 g    Use in morning to rash    CTCL (cutaneous T-cell lymphoma) (H)       VITAMIN D-3 PO      Take 1 tablet by mouth daily.

## 2017-12-08 NOTE — NURSING NOTE
Lyndsey Hagen comes into clinic today at the request of Dr Guillory Ordering Provider for UVA-1    This service provided today was under the supervising provider of the day Dr Merlos, who was available if needed.    Pooja Jordan RN

## 2017-12-11 ENCOUNTER — OFFICE VISIT (OUTPATIENT)
Dept: DERMATOLOGY | Facility: CLINIC | Age: 67
End: 2017-12-11

## 2017-12-11 ENCOUNTER — COMMUNICATION - HEALTHEAST (OUTPATIENT)
Dept: INTERNAL MEDICINE | Facility: CLINIC | Age: 67
End: 2017-12-11

## 2017-12-11 DIAGNOSIS — C84.00 MYCOSIS FUNGOIDES, UNSPECIFIED BODY REGION (H): ICD-10-CM

## 2017-12-11 DIAGNOSIS — C84.A0 CTCL (CUTANEOUS T-CELL LYMPHOMA) (H): Primary | ICD-10-CM

## 2017-12-11 NOTE — NURSING NOTE
[facility] Dermatology Phototherapy Record  1. Lyndsey Hagen is a 67 year old female is here today for phototherapy (UVA1) treatment for CTCL.        Changes or new medications since last treatment:   NO    New medical conditions or problems since last treatment:   NO    Any problems with last phototherapy treatment?    NO    2. The patient tolerated phototherapy without complication    Patient will return for next UVA1 treatment, per protocol.     Patient to see provider every 4-12 weeks for follow-up during treatment.      All questions and concerns discussed with patient in clinic today.      Jordyn Ricci      Lyndsey Hagen comes into clinic today at the request of [place] Ordering Provider for UVA1      This service provided today was under the supervising provider of the amy Vincent, who was available if needed.    Jordyn Ricci

## 2017-12-11 NOTE — MR AVS SNAPSHOT
After Visit Summary   12/11/2017    Lyndsey Hagen    MRN: [id number]           Patient Information     Date Of Birth          1950        Visit Information        Provider Department      12/11/2017 7:30 AM UC DERM LIGHT TREATMENT [facility]        Today's Diagnoses     CTCL (cutaneous T-cell lymphoma) (H)    -  1    Mycosis fungoides, unspecified body region (H)           Follow-ups after your visit        Your next 10 appointments already scheduled     Dec 18, 2017  7:30 AM CST   (Arrive by 7:15 AM)   Derm Light Extended with UC DERM LIGHT TREATMENT   [facility] ([facility])    [address] [place] [id number] [phone]            Dec 22, 2017  8:00 AM CST   (Arrive by 7:45 AM)   Derm Light Extended with UC DERM LIGHT TREATMENT   [facility])    [address] [place] [id number] [phone]            Dec 27, 2017 11:30 AM CST   (Arrive by 11:15 AM)   Return T-Cell Visit with Arlen Guillory MD   [facility])    [address] [place] [id number] [phone]              Who to contact     Please call your clinic at [phone] to:    Ask questions about your health    Make or cancel appointments    Discuss your medicines    Learn about your test results    Speak to your doctor   If you have compliments or concerns about an experience at your clinic, or if you wish to file a complaint, please contact [facility] Patient Relations at [phone] or email us at [email or web address]         Additional Information About Your Visit        MyChart Information     [employer] gives you secure access to your electronic health record. If you see a primary care provider, you can also send messages to your care team and make appointments. If you have  questions, please call your primary care clinic.  If you do not have a primary care provider, please call [phone] and they will assist you.      [employer] is an electronic gateway that provides easy, online access to your medical records. With [employer], you can request a clinic appointment, read your test results, renew a prescription or communicate with your care team.     To access your existing account, please contact your [facility] or call [phone] for assistance.        Care EveryWhere ID     This is your Care EveryWhere ID. This could be used by other organizations to access your [place] medical records  [id number]         Blood Pressure from Last 3 Encounters:   09/19/17 109/73   02/20/17 118/75   11/21/16 120/75    Weight from Last 3 Encounters:   05/23/16 73.5 kg (162 lb)   04/25/16 72.6 kg (160 lb)   02/22/16 76.1 kg (167 lb 12.8 oz)              We Performed the Following     CHARGE - PHOTOTHERAPY - UVA1     PROCEDURE - PHOTOTHERAPY UVA1        Primary Care Provider Office Phone # Fax #    April Rosalva [phone]       [facility] [address] [place] [address]        Equal Access to Services     KARTHIK PAULA : Hadii aad ku hadasho Soomaali, waaxda luqadaha, qaybta kaalmada adeegyada, manuel valentin. So [facility] [phone].    ATENCIÓN: Si habla español, tiene a bustillos disposición servicios gratuitos de asistencia lingüística. Llame al [phone].    We comply with applicable federal civil rights laws and [place] laws. We do not discriminate on the basis of race, color, national origin, age, disability, sex, sexual orientation, or gender identity.            Thank you!     Thank you for choosing [facility]  for your care. Our goal is always to provide you with excellent care. Hearing back from our patients is one way we can continue to improve our services. Please take a few minutes to complete  the written survey that you may receive in the mail after your visit with us. Thank you!             Your Updated Medication List - Protect others around you: Learn how to safely use, store and throw away your medicines at www.disposemymeds.org.          This list is accurate as of: 12/11/17  8:45 AM.  Always use your most recent med list.                   Brand Name Dispense Instructions for use Diagnosis    CO Q-10 PO      Take 1 tablet by mouth daily.        KRILL OIL PO      Take  by mouth daily.        MULTIVITAMINS PO      Take  by mouth daily.        SOLUBLE FIBER/PROBIOTICS PO      Take 2 tablets by mouth daily.        TARGRETIN 1 % Gel   Generic drug:  Bexarotene     60 g    APPLY EVERY OTHER NIGHT TO EVERY NIGHT AS DIRECTED    CTCL (cutaneous T-cell lymphoma) (H)       triamcinolone 0.1 % cream    KENALOG    454 g    Use in morning to rash    CTCL (cutaneous T-cell lymphoma) (H)       VITAMIN D-3 PO      Take 1 tablet by mouth daily.

## 2017-12-15 ENCOUNTER — RECORDS - HEALTHEAST (OUTPATIENT)
Dept: MAMMOGRAPHY | Facility: CLINIC | Age: 67
End: 2017-12-15

## 2017-12-15 DIAGNOSIS — Z12.31 ENCOUNTER FOR SCREENING MAMMOGRAM FOR MALIGNANT NEOPLASM OF BREAST: ICD-10-CM

## 2017-12-18 ENCOUNTER — OFFICE VISIT (OUTPATIENT)
Dept: DERMATOLOGY | Facility: CLINIC | Age: 67
End: 2017-12-18
Payer: COMMERCIAL

## 2017-12-18 DIAGNOSIS — C84.00 MYCOSIS FUNGOIDES, UNSPECIFIED BODY REGION (H): ICD-10-CM

## 2017-12-18 LAB — MAMMOGRAM: NORMAL

## 2017-12-18 NOTE — PROGRESS NOTES
[facility] Phototherapy Record  1. Lyndsey Hagen is a 67 year old female is here today for phototherapy (UVA-1) treatment for Mycosis fungoides, unspecified body region (H).        Changes or new medications since last treatment:   NO    New medical conditions or problems since last treatment:   NO    Any problems with last phototherapy treatment?    NO    2. The patient tolerated phototherapy without complication    Patient will return for next UVA treatment, per protocol.     Patient to see provider every 4-12 weeks for follow-up during treatment.      All questions and concerns discussed with patient in clinic today.      Pooja Jordan RN

## 2017-12-18 NOTE — MR AVS SNAPSHOT
After Visit Summary   12/18/2017    Lyndsey Hagen    MRN: [id number]           Patient Information     Date Of Birth          1950        Visit Information        Provider Department      12/18/2017 7:30 AM UC DERM LIGHT TREATMENT [facility]        Today's Diagnoses     Mycosis fungoides, unspecified body region (H)           Follow-ups after your visit        Your next 10 appointments already scheduled     Dec 22, 2017  8:00 AM CST   (Arrive by 7:45 AM)   Derm Light Extended with UC DERM LIGHT TREATMENT   [facility] ([facility])    [address] [place] [address] [phone]            Dec 27, 2017 11:30 AM CST   (Arrive by 11:15 AM)   Return T-Cell Visit with Arlen Guillory MD   [facility] ([facility])    [address] [place] [address] [phone]              Who to contact     Please call your clinic at [phone] to:    Ask questions about your health    Make or cancel appointments    Discuss your medicines    Learn about your test results    Speak to your doctor   If you have compliments or concerns about an experience at your clinic, or if you wish to file a complaint, please contact [facility] Patient Relations at [phone] or email us at [email or web address]         Additional Information About Your Visit        MyChart Information     [employer] gives you secure access to your electronic health record. If you see a primary care provider, you can also send messages to your care team and make appointments. If you have questions, please call your primary care clinic.  If you do not have a primary care provider, please call [phone] and they will assist you.      [employer] is an electronic gateway that provides easy, online access to your medical records. With [employer], you can request a clinic appointment,  read your test results, renew a prescription or communicate with your care team.     To access your existing account, please contact your [facility] or call [phone] for assistance.        Care EveryWhere ID     This is your Care EveryWhere ID. This could be used by other organizations to access your [place] medical records  [id number]         Blood Pressure from Last 3 Encounters:   09/19/17 109/73   02/20/17 118/75   11/21/16 120/75    Weight from Last 3 Encounters:   05/23/16 73.5 kg (162 lb)   04/25/16 72.6 kg (160 lb)   02/22/16 76.1 kg (167 lb 12.8 oz)              We Performed the Following     CHARGE - PHOTOTHERAPY - UVA1     PROCEDURE - PHOTOTHERAPY UVA1        Primary Care Provider Office Phone # Fax #    April Rosalva [phone]       [facility] [address] [place] [address]        Equal Access to Services     KARTHIK PAULA : Hadii aad ku hadasho Soomaali, waaxda luqadaha, qaybta kaalmada adeegyada, waxay coltenin haybeny miranda . So [facility] [phone].    ATENCIÓN: Si habla español, tiene a bustillos disposición servicios gratuitos de asistencia lingüística. Llame al [phone].    We comply with applicable federal civil rights laws and [place] laws. We do not discriminate on the basis of race, color, national origin, age, disability, sex, sexual orientation, or gender identity.            Thank you!     Thank you for choosing [facility]  for your care. Our goal is always to provide you with excellent care. Hearing back from our patients is one way we can continue to improve our services. Please take a few minutes to complete the written survey that you may receive in the mail after your visit with us. Thank you!             Your Updated Medication List - Protect others around you: Learn how to safely use, store and throw away your medicines at www.disposemymeds.org.          This list is accurate as of: 12/18/17   8:29 AM.  Always use your most recent med list.                   Brand Name Dispense Instructions for use Diagnosis    CO Q-10 PO      Take 1 tablet by mouth daily.        KRILL OIL PO      Take  by mouth daily.        MULTIVITAMINS PO      Take  by mouth daily.        SOLUBLE FIBER/PROBIOTICS PO      Take 2 tablets by mouth daily.        TARGRETIN 1 % Gel   Generic drug:  Bexarotene     60 g    APPLY EVERY OTHER NIGHT TO EVERY NIGHT AS DIRECTED    CTCL (cutaneous T-cell lymphoma) (H)       triamcinolone 0.1 % cream    KENALOG    454 g    Use in morning to rash    CTCL (cutaneous T-cell lymphoma) (H)       VITAMIN D-3 PO      Take 1 tablet by mouth daily.

## 2017-12-18 NOTE — NURSING NOTE
Lyndsey Hagen comes into clinic today at the request of Dr Guillory Ordering Provider for UVA-1    This service provided today was under the supervising provider of the day Dr Vincent, who was available if needed.    Pooja Jordan RN

## 2017-12-19 ENCOUNTER — OFFICE VISIT - HEALTHEAST (OUTPATIENT)
Dept: INTERNAL MEDICINE | Facility: CLINIC | Age: 67
End: 2017-12-19

## 2017-12-19 ENCOUNTER — COMMUNICATION - HEALTHEAST (OUTPATIENT)
Dept: INTERNAL MEDICINE | Facility: CLINIC | Age: 67
End: 2017-12-19

## 2017-12-19 ENCOUNTER — AMBULATORY - HEALTHEAST (OUTPATIENT)
Dept: INTERNAL MEDICINE | Facility: CLINIC | Age: 67
End: 2017-12-19

## 2017-12-19 DIAGNOSIS — C84.A0 CUTANEOUS T-CELL LYMPHOMA, UNSPECIFIED BODY REGION (H): ICD-10-CM

## 2017-12-19 DIAGNOSIS — Z79.899 MEDICATION MANAGEMENT: ICD-10-CM

## 2017-12-19 DIAGNOSIS — Z00.00 WELCOME TO MEDICARE PREVENTIVE VISIT: ICD-10-CM

## 2017-12-19 DIAGNOSIS — E55.9 VITAMIN D DEFICIENCY: ICD-10-CM

## 2017-12-19 LAB
ATRIAL RATE - MUSE: 73 BPM
CHOLEST SERPL-MCNC: 268 MG/DL
DIASTOLIC BLOOD PRESSURE - MUSE: NORMAL MMHG
FASTING STATUS PATIENT QL REPORTED: YES
HDLC SERPL-MCNC: 61 MG/DL
INTERPRETATION ECG - MUSE: NORMAL
LDLC SERPL CALC-MCNC: 172 MG/DL
P AXIS - MUSE: 68 DEGREES
PR INTERVAL - MUSE: 204 MS
QRS DURATION - MUSE: 76 MS
QT - MUSE: 414 MS
QTC - MUSE: 456 MS
R AXIS - MUSE: 67 DEGREES
SYSTOLIC BLOOD PRESSURE - MUSE: NORMAL MMHG
T AXIS - MUSE: 48 DEGREES
TRIGL SERPL-MCNC: 176 MG/DL
VENTRICULAR RATE- MUSE: 73 BPM

## 2017-12-19 ASSESSMENT — MIFFLIN-ST. JEOR: SCORE: 1308.34

## 2017-12-21 ENCOUNTER — COMMUNICATION - HEALTHEAST (OUTPATIENT)
Dept: INTERNAL MEDICINE | Facility: CLINIC | Age: 67
End: 2017-12-21

## 2017-12-22 ENCOUNTER — OFFICE VISIT (OUTPATIENT)
Dept: DERMATOLOGY | Facility: CLINIC | Age: 67
End: 2017-12-22
Payer: COMMERCIAL

## 2017-12-22 DIAGNOSIS — C84.00 MYCOSIS FUNGOIDES, UNSPECIFIED BODY REGION (H): ICD-10-CM

## 2017-12-22 NOTE — NURSING NOTE
Lyndsey Hagen comes into clinic today at the request of Dr Guillory Ordering Provider for UVA-1.    This service provided today was under the supervising provider of the day Dr Merlos, who was available if needed.    Pooja Jordan RN

## 2017-12-22 NOTE — MR AVS SNAPSHOT
After Visit Summary   12/22/2017    Lyndsey Hagen    MRN: [id number]           Patient Information     Date Of Birth          1950        Visit Information        Provider Department      12/22/2017 8:00 AM UC DERM LIGHT TREATMENT [facility]        Today's Diagnoses     Mycosis fungoides, unspecified body region (H)           Follow-ups after your visit        Your next 10 appointments already scheduled     Dec 27, 2017 11:30 AM CST   (Arrive by 11:15 AM)   Return T-Cell Visit with Arlen Guillory MD   [facility] ([facility])    [address] [place] [address] [phone]              Who to contact     Please call your clinic at [phone] to:    Ask questions about your health    Make or cancel appointments    Discuss your medicines    Learn about your test results    Speak to your doctor   If you have compliments or concerns about an experience at your clinic, or if you wish to file a complaint, please contact [facility] Patient Relations at [phone] or email us at [email or web address]         Additional Information About Your Visit        MyChart Information     [employer] gives you secure access to your electronic health record. If you see a primary care provider, you can also send messages to your care team and make appointments. If you have questions, please call your primary care clinic.  If you do not have a primary care provider, please call [phone] and they will assist you.      [employer] is an electronic gateway that provides easy, online access to your medical records. With [employer], you can request a clinic appointment, read your test results, renew a prescription or communicate with your care team.     To access your existing account, please contact your [facility] or call [phone] for assistance.        Care EveryWhere ID      This is your Care EveryWhere ID. This could be used by other organizations to access your [place] medical records  [id number]         Blood Pressure from Last 3 Encounters:   09/19/17 109/73   02/20/17 118/75   11/21/16 120/75    Weight from Last 3 Encounters:   05/23/16 73.5 kg (162 lb)   04/25/16 72.6 kg (160 lb)   02/22/16 76.1 kg (167 lb 12.8 oz)              We Performed the Following     CHARGE - PHOTOTHERAPY - UVA1     PROCEDURE - PHOTOTHERAPY UVA1        Primary Care Provider Office Phone # Fax #    April Rosalva [phone]       [place] [address]        Equal Access to Services     KARTHIK PAULA : Hadii camden ku hadasho Sogigiali, waaxda luqadaha, qaybta kaalmada adeegyada, waxay idiin britney valentin. So [facility] [phone].    ATENCIÓN: Si habla español, tiene a bustillos disposición servicios gratuitos de asistencia lingüística. LlOhio[facility] [phone].    We comply with applicable federal civil rights laws and [place] laws. We do not discriminate on the basis of race, color, national origin, age, disability, sex, sexual orientation, or gender identity.            Thank you!     Thank you for choosing [facility]  for your care. Our goal is always to provide you with excellent care. Hearing back from our patients is one way we can continue to improve our services. Please take a few minutes to complete the written survey that you may receive in the mail after your visit with us. Thank you!             Your Updated Medication List - Protect others around you: Learn how to safely use, store and throw away your medicines at www.disposemymeds.org.          This list is accurate as of: 12/22/17  8:30 AM.  Always use your most recent med list.                   Brand Name Dispense Instructions for use Diagnosis    CO Q-10 PO      Take 1 tablet by mouth daily.        KRILL OIL PO      Take  by mouth daily.        MULTIVITAMINS PO      Take   by mouth daily.        SOLUBLE FIBER/PROBIOTICS PO      Take 2 tablets by mouth daily.        TARGRETIN 1 % Gel   Generic drug:  Bexarotene     60 g    APPLY EVERY OTHER NIGHT TO EVERY NIGHT AS DIRECTED    CTCL (cutaneous T-cell lymphoma) (H)       triamcinolone 0.1 % cream    KENALOG    454 g    Use in morning to rash    CTCL (cutaneous T-cell lymphoma) (H)       VITAMIN D-3 PO      Take 1 tablet by mouth daily.

## 2017-12-22 NOTE — PROGRESS NOTES
[facility] Phototherapy Record  1. Lyndsey Hagen is a 67 year old female is here today for phototherapy (UVA-1) treatment for Mycosis fungoides, unspecified body region .        Changes or new medications since last treatment:   NO    New medical conditions or problems since last treatment:   NO    Any problems with last phototherapy treatment?    NO    2. The patient tolerated phototherapy without complication    Patient will return for next UVA treatment, per protocol.     Patient to see provider every 4-12 weeks for follow-up during treatment.      All questions and concerns discussed with patient in clinic today.      Pooja Jordan RN

## 2017-12-27 ENCOUNTER — OFFICE VISIT (OUTPATIENT)
Dept: DERMATOLOGY | Facility: CLINIC | Age: 67
End: 2017-12-27
Payer: COMMERCIAL

## 2017-12-27 ENCOUNTER — RECORDS - HEALTHEAST (OUTPATIENT)
Dept: ADMINISTRATIVE | Facility: OTHER | Age: 67
End: 2017-12-27

## 2017-12-27 VITALS — SYSTOLIC BLOOD PRESSURE: 138 MMHG | DIASTOLIC BLOOD PRESSURE: 80 MMHG | HEART RATE: 80 BPM

## 2017-12-27 DIAGNOSIS — C84.00 MYCOSIS FUNGOIDES, UNSPECIFIED BODY REGION (H): ICD-10-CM

## 2017-12-27 DIAGNOSIS — C84.00 MYCOSIS FUNGOIDES, UNSPECIFIED BODY REGION (H): Primary | ICD-10-CM

## 2017-12-27 ASSESSMENT — PAIN SCALES - GENERAL: PAINLEVEL: NO PAIN (0)

## 2017-12-27 NOTE — PROGRESS NOTES
[facility] Phototherapy Record  1. Lyndsey Hagen is a 67 year old female is here today for phototherapy (UVB) treatment for Mycosis fungoides, unspecified body region.        Changes or new medications since last treatment:   NO    New medical conditions or problems since last treatment:   NO    Any problems with last phototherapy treatment?    NO    2. The patient tolerated phototherapy without complication    Patient will return on  for next UVB treatment, per protocol.     Patient to see provider every 4-12 weeks for follow-up during treatment.      All questions and concerns discussed with patient in clinic today.      Patrica Fuentes      Lyndsey Hagen comes into clinic today at the request of Dr. Guillory Ordering Provider for UVA1        This service provided today was under the supervising provider of the day Dr. Rader, who was available if needed.    Patrica Fuentes

## 2017-12-27 NOTE — MR AVS SNAPSHOT
After Visit Summary   12/27/2017    Lyndsey Hagen    MRN: [id number]           Patient Information     Date Of Birth          1950        Visit Information        Provider Department      12/27/2017 11:30 AM Arlen Guillory MD [facility]        Today's Diagnoses     Mycosis fungoides, unspecified body region (H)    -  1       Follow-ups after your visit        Your next 10 appointments already scheduled     Jan 03, 2018  7:20 AM CST   (Arrive by 7:05 AM)   Derm Light Extended with UC DERM LIGHT TREATMENT   [facility])    [address] [place] [address]   [phone]            Jan 12, 2018 10:00 AM CST   (Arrive by 9:45 AM)   Derm Light Extended with UC DERM LIGHT TREATMENT   [facility])    [address] [place] [address]   [phone]            Jan 18, 2018  9:50 AM CST   (Arrive by 9:35 AM)   Derm Light Extended with UC DERM LIGHT TREATMENT   [facility])    [address] [place] [address]   [phone]            Jan 25, 2018  9:50 AM CST   (Arrive by 9:35 AM)   Derm Light Extended with UC DERM LIGHT TREATMENT   [facility])    [address] [place] [address]   [phone]            Feb 12, 2018  8:00 AM CST   (Arrive by 7:45 AM)   Derm Light Extended with UC DERM LIGHT TREATMENT   [facility])    [address] [place] [address]   [phone]            Mar 01, 2018  8:00 AM CST   (Arrive by 7:45 AM)   Derm Light Extended with UC DERM LIGHT TREATMENT   [facility])    [address] [place] [address]   [phone]            Mar 05, 2018   7:30 AM CST   (Arrive by 7:15 AM)   Derm Light Extended with UC DERM LIGHT TREATMENT   [facility] ([facility])    [address] [address]   [phone]            Mar 12, 2018  7:30 AM CDT   (Arrive by 7:15 AM)   Derm Light Extended with UC DERM LIGHT TREATMENT   [facility] ([facility])    [address] [address] [phone]            Mar 19, 2018  7:30 AM CDT   (Arrive by 7:15 AM)   Derm Light Extended with UC DERM LIGHT TREATMENT   [facility] ([facility])    [address] [address]   [phone]            Mar 26, 2018  7:30 AM CDT   (Arrive by 7:15 AM)   Derm Light Extended with UC DERM LIGHT TREATMENT   [facility] ([facility])    [address] [id number] [phone]              Who to contact     Please call your clinic at [phone] to:    Ask questions about your health    Make or cancel appointments    Discuss your medicines    Learn about your test results    Speak to your doctor   If you have compliments or concerns about an experience at your clinic, or if you wish to file a complaint, please contact [facility] Patient Relations at [phone] or email us at [email or web address]         Additional Information About Your Visit        [employer] Information     [employer] gives you secure access to your electronic health record. If you see a primary care provider, you can also send messages to your care team and make appointments. If you have questions, please call your primary care clinic.  If you do not have a primary care provider, please call [phone] and they will assist you.      [employer] is an electronic gateway that provides easy, online access to your medical records. With  [employer], you can request a clinic appointment, read your test results, renew a prescription or communicate with your care team.     To access your existing account, please contact your [facility] or call [phone] for assistance.        Care EveryWhere ID     This is your Care EveryWhere ID. This could be used by other organizations to access your [place] medical records  [id number]        Your Vitals Were     Pulse                   80            Blood Pressure from Last 3 Encounters:   12/27/17 138/80   09/19/17 109/73   02/20/17 118/75    Weight from Last 3 Encounters:   05/23/16 73.5 kg (162 lb)   04/25/16 72.6 kg (160 lb)   02/22/16 76.1 kg (167 lb 12.8 oz)              Today, you had the following     No orders found for display       Primary Care Provider Office Phone # Fax #    April Rosalva [phone]       [facility] [address] [place] [address]        Equal Access to Services     KARTHIK PAULA : Hadii aad ku hadasho Soomaali, waaxda luqadaha, qaybta kaalmada adeegyada, waxay idiin hayaan adeeg kharafred la'turnern . So [facility] [phone].    ATENCIÓN: Si habla español, tiene a bustillos disposición servicios gratuitos de asistencia lingüística. Hipolitoame al [phone].    We comply with applicable federal civil rights laws and [place] laws. We do not discriminate on the basis of race, color, national origin, age, disability, sex, sexual orientation, or gender identity.            Thank you!     Thank you for choosing [facility] DERMAT[facility]  for your care. Our goal is always to provide you with excellent care. Hearing back from our patients is one way we can continue to improve our services. Please take a few minutes to complete the written survey that you may receive in the mail after your visit with us. Thank you!             Your Updated Medication List - Protect others around you: Learn how to safely use, store and throw away your medicines at  www.disposemymeds.org.          This list is accurate as of: 12/27/17 12:17 PM.  Always use your most recent med list.                   Brand Name Dispense Instructions for use Diagnosis    CO Q-10 PO      Take 1 tablet by mouth daily.        KRILL OIL PO      Take  by mouth daily.        MULTIVITAMINS PO      Take  by mouth daily.        SOLUBLE FIBER/PROBIOTICS PO      Take 2 tablets by mouth daily.        TARGRETIN 1 % Gel   Generic drug:  Bexarotene     60 g    APPLY EVERY OTHER NIGHT TO EVERY NIGHT AS DIRECTED    CTCL (cutaneous T-cell lymphoma) (H)       triamcinolone 0.1 % cream    KENALOG    454 g    Use in morning to rash    CTCL (cutaneous T-cell lymphoma) (H)       VITAMIN D-3 PO      Take 1 tablet by mouth daily.

## 2017-12-27 NOTE — PROGRESS NOTES
[facility] Dermatology Note      Dermatology Problem List:  1. CTCL, stage IIB  -Previously treated with PUVA with good response. Treated at [place] prior to establishing care here. Stopped due to difficulty percuring oxsoralen and risks associated with PUVA.  Now on UVA1 treatment  -Temporarily discontinued UVA1 (4/26/17) due to insurance  -Targretin 1% gel (added on 5/23/16) with good response.   -Triamcinolone 0.1% cream as needed for irritation from targretin gel.    Encounter Date: Dec 27, 2017    CC:  Chief Complaint   Patient presents with     Derm Problem     Lyndsey is here today for a T-cell follow up. No areas of concern.          History of Present Illness:  Ms. Lyndsey Hagen is a 67 year old female with past medical history of CTCL who presents as a follow-up for skin check. She was last seen on 09/19/2017 when she was re-started on UVA1 treatment. She reports one spot on her back that she has noticed since her last visit. No other areas of concern per patient. She has been using her targretin gel as needed.     Past Medical History:   Patient Active Problem List   Diagnosis     Cutaneous T-cell lymphoma (H)     Past Medical History:   Diagnosis Date     Cutaneous T-cell lymphoma (H)      Past Surgical History:   Procedure Laterality Date     NO HISTORY OF SURGERY  10/28/31    derm       Social History:  The patient is a retired in [employer]    Family History:  There is a family history of non-melanoma skin cancer in the patient's mother and sisters .    Medications:  Current Outpatient Prescriptions   Medication Sig Dispense Refill     TARGRETIN 1 % GEL APPLY EVERY OTHER NIGHT TO EVERY NIGHT AS DIRECTED 60 g 5     triamcinolone (KENALOG) 0.1 % cream Use in morning to rash 454 g 5     Multiple Vitamin (MULTIVITAMINS PO) Take  by mouth daily.       Probiotic Product (SOLUBLE FIBER/PROBIOTICS PO) Take 2 tablets by mouth daily.       KRILL OIL PO Take  by mouth daily.       Cholecalciferol  (VITAMIN D-3 PO) Take 1 tablet by mouth daily.       Coenzyme Q10 (CO Q-10 PO) Take 1 tablet by mouth daily.       Allergies   Allergen Reactions     Nkda [No Known Drug Allergies]          Review of Systems:  -As per HPI  -Constitutional: The patient denies fatigue, fevers, chills, unintended weight loss, and night sweats.  -HEENT: Patient denies nonhealing oral sores.  -Skin: As above in HPI. No additional skin concerns.    Physical exam:  Vitals: /80 (Cuff Size: Adult Regular)  Pulse 80  GEN: This is a well developed, well-nourished female in no acute distress, in a pleasant mood.    SKIN: Full skin, which includes the head/face, both arms, chest, back, abdomen,both legs, genitalia and/or groin buttocks, digits and/or nails, was examined.  - Seborrheic keratosis on the back (Brown stuck on patch)  - scattered hypopigmented patches and plaques on trunk and extremities  - scattered light brown pigmented macules without irregularity consistent on the torso and extremities  -No other lesions of concern on areas examined.   LYMPH: No cervical, submandibular, axillary or inguinal adenopathy    Impression/Plan:  1. CTCL; stage IIB Overall patient doing well and has responded well to the UVA1 treatment. Has several hypopigmented patches and plaques on exam. She has been doing twice weekly UVA1, her last treatment was on 12/22/17.     Continue UVA1 phototherapy at medium dose but will reduce the frequency to once weekly. One treatment to be given in clinic today.      Continue targretin 1% gel as needed to active lesions    Continue daily whole body moisturizing     Follow-up in 3 months, earlier for new or changing lesions.     Staff Involved:  MD Priyanka Spann MD MPH  PGY3- [facility], Family Medicine  Pager- [phone]    .I, Arlen Guillory MD, saw this patient with the resident and agree with the resident s findings and plan of care as documented in the resident s note.

## 2017-12-27 NOTE — NURSING NOTE
Dermatology Rooming Note    Lyndsey Hagen's goals for this visit include:   Chief Complaint   Patient presents with     Derm Problem     Lyndsey is here today for a T-cell follow up. No areas of concern.      Brianna Escamilla MA

## 2017-12-27 NOTE — MR AVS SNAPSHOT
After Visit Summary   12/27/2017    Lyndsey Hagen    MRN: [id number]           Patient Information     Date Of Birth          1950        Visit Information        Provider Department      12/27/2017 12:00 PM UC DERM LIGHT TREATMENT [facility] Dermatology        Today's Diagnoses     Mycosis fungoides, unspecified body region (H)           Follow-ups after your visit        Your next 10 appointments already scheduled     Jan 03, 2018  7:20 AM CST   (Arrive by 7:05 AM)   Derm Light Extended with UC DERM LIGHT TREATMENT   [facility])    [address] [address]   [phone]            Jan 12, 2018 10:00 AM CST   (Arrive by 9:45 AM)   Derm Light Extended with UC DERM LIGHT TREATMENT   [facility])    [address] [place] [address]   [phone]            Jan 18, 2018  9:50 AM CST   (Arrive by 9:35 AM)   Derm Light Extended with UC DERM LIGHT TREATMENT   [facility])    [address] [place] [address]   [phone]            Jan 25, 2018  9:50 AM CST   (Arrive by 9:35 AM)   Derm Light Extended with UC DERM LIGHT TREATMENT   [facility])    [address] [place] [address]   [phone]            Feb 12, 2018  8:00 AM CST   (Arrive by 7:45 AM)   Derm Light Extended with UC DERM LIGHT TREATMENT   [facility])    [address] [place] [address]   [phone]            Mar 01, 2018  8:00 AM CST   (Arrive by 7:45 AM)   Derm Light Extended with UC DERM LIGHT TREATMENT   [facility])    [address] [place] [address]   [phone]            Mar 05, 2018  7:30 AM  CST   (Arrive by 7:15 AM)   Derm Light Extended with UC DERM LIGHT TREATMENT   [facility] ([facility])    [address] [address]   [phone]            Mar 12, 2018  7:30 AM CDT   (Arrive by 7:15 AM)   Derm Light Extended with UC DERM LIGHT TREATMENT   [facility] ([facility])    [address] [address] [phone]            Mar 19, 2018  7:30 AM CDT   (Arrive by 7:15 AM)   Derm Light Extended with UC DERM LIGHT TREATMENT   [facility] Dermatology ([facility])    [address] [address] [phone]            Mar 26, 2018  7:30 AM CDT   (Arrive by 7:15 AM)   Derm Light Extended with UC DERM LIGHT TREATMENT   [facility] ([facility])    [address] [place] [id number] [phone]              Who to contact     Please call your clinic at [phone] to:    Ask questions about your health    Make or cancel appointments    Discuss your medicines    Learn about your test results    Speak to your doctor   If you have compliments or concerns about an experience at your clinic, or if you wish to file a complaint, please contact [facility] Patient Relations at [phone] or email us at [email or web address]         Additional Information About Your Visit        Hint Inchart Information     OurCrowd gives you secure access to your electronic health record. If you see a primary care provider, you can also send messages to your care team and make appointments. If you have questions, please call your primary care clinic.  If you do not have a primary care provider, please call [phone] and they will assist you.      OurCrowd is an electronic gateway that provides easy, online access to your medical records. With Our[employer]d, you  can request a clinic appointment, read your test results, renew a prescription or communicate with your care team.     To access your existing account, please contact your [facility] or call [phone] for assistance.        Care EveryWhere ID     This is your Care EveryWhere ID. This could be used by other organizations to access your [place] medical records  [id number]         Blood Pressure from Last 3 Encounters:   12/27/17 138/80   09/19/17 109/73   02/20/17 118/75    Weight from Last 3 Encounters:   05/23/16 73.5 kg (162 lb)   04/25/16 72.6 kg (160 lb)   02/22/16 76.1 kg (167 lb 12.8 oz)              We Performed the Following     CHARGE - PHOTOTHERAPY - UVA1     PROCEDURE - PHOTOTHERAPY UVA1        Primary Care Provider Office Phone # Fax #    April Rosalva [phone]       [place] [address]        Equal Access to Services     KARTHIK PAULA : Hadii aad ku hadasho Soomaali, waaxda luqadaha, qaybta kaalmada adeegyada, waxay idiin hayturnern chery miranda . So [facility] [phone].    ATENCIÓN: Si bernardala español, tiene a bustillos disposición servicios gratuitos de asistencia lingüística. Llame al [phone].    We comply with applicable federal civil rights laws and [place] laws. We do not discriminate on the basis of race, color, national origin, age, disability, sex, sexual orientation, or gender identity.            Thank you!     Thank you for choosing [facility] DERMATOLOGY  for your care. Our goal is always to provide you with excellent care. Hearing back from our patients is one way we can continue to improve our services. Please take a few minutes to complete the written survey that you may receive in the mail after your visit with us. Thank you!             Your Updated Medication List - Protect others around you: Learn how to safely use, store and throw away your medicines at www.disposemymeds.org.          This  list is accurate as of: 12/27/17 12:48 PM.  Always use your most recent med list.                   Brand Name Dispense Instructions for use Diagnosis    CO Q-10 PO      Take 1 tablet by mouth daily.        KRILL OIL PO      Take  by mouth daily.        MULTIVITAMINS PO      Take  by mouth daily.        SOLUBLE FIBER/PROBIOTICS PO      Take 2 tablets by mouth daily.        TARGRETIN 1 % Gel   Generic drug:  Bexarotene     60 g    APPLY EVERY OTHER NIGHT TO EVERY NIGHT AS DIRECTED    CTCL (cutaneous T-cell lymphoma) (H)       triamcinolone 0.1 % cream    KENALOG    454 g    Use in morning to rash    CTCL (cutaneous T-cell lymphoma) (H)       VITAMIN D-3 PO      Take 1 tablet by mouth daily.

## 2017-12-27 NOTE — LETTER
12/27/2017       RE: Lyndsey Hagen  [address]  [place] [address]     Dear Colleague,    Thank you for referring your patient, Lyndsey Hagen, to the [facility] DERMATOLOGY at [facility]. Please see a copy of my visit note below.    [facility] Dermatology Note      Dermatology Problem List:  1. CTCL, stage IIB  -Previously treated with PUVA with good response. Treated at [place] prior to establishing care here. Stopped due to difficulty percuring oxsoralen and risks associated with PUVA.  Now on UVA1 treatment  -Temporarily discontinued UVA1 (4/26/17) due to insurance  -Targretin 1% gel (added on 5/23/16) with good response.   -Triamcinolone 0.1% cream as needed for irritation from targretin gel.    Encounter Date: Dec 27, 2017    CC:  Chief Complaint   Patient presents with     Derm Problem     Lyndsey is here today for a T-cell follow up. No areas of concern.      History of Present Illness:  Ms. Lyndsey Hagen is a 67 year old female with past medical history of CTCL who presents as a follow-up for skin check. She was last seen on 09/19/2017 when she was re-started on UVA1 treatment. She reports one spot on her back that she has noticed since her last visit. No other areas of concern per patient. She has been using her targretin gel as needed.     Past Medical History:   Patient Active Problem List   Diagnosis     Cutaneous T-cell lymphoma (H)     Past Medical History:   Diagnosis Date     Cutaneous T-cell lymphoma (H)      Past Surgical History:   Procedure Laterality Date     NO HISTORY OF SURGERY  10/28/31    derm     Social History:  The patient is a retired in [employer].    Family History:  There is a family history of non-melanoma skin cancer in the patient's mother and sisters .    Medications:  Current Outpatient Prescriptions   Medication Sig Dispense Refill     TARGRETIN 1 % GEL APPLY EVERY OTHER NIGHT TO EVERY NIGHT AS DIRECTED 60 g 5      triamcinolone (KENALOG) 0.1 % cream Use in morning to rash 454 g 5     Multiple Vitamin (MULTIVITAMINS PO) Take  by mouth daily.       Probiotic Product (SOLUBLE FIBER/PROBIOTICS PO) Take 2 tablets by mouth daily.       KRILL OIL PO Take  by mouth daily.       Cholecalciferol (VITAMIN D-3 PO) Take 1 tablet by mouth daily.       Coenzyme Q10 (CO Q-10 PO) Take 1 tablet by mouth daily.       Allergies   Allergen Reactions     Nkda [No Known Drug Allergies]      Review of Systems:  -As per HPI  -Constitutional: The patient denies fatigue, fevers, chills, unintended weight loss, and night sweats.  -HEENT: Patient denies nonhealing oral sores.  -Skin: As above in HPI. No additional skin concerns.    Physical exam:  Vitals: /80 (Cuff Size: Adult Regular)  Pulse 80  GEN: This is a well developed, well-nourished female in no acute distress, in a pleasant mood.    SKIN: Full skin, which includes the head/face, both arms, chest, back, abdomen,both legs, genitalia and/or groin buttocks, digits and/or nails, was examined.  - Seborrheic keratosis on the back (Brown stuck on patch)  - scattered hypopigmented patches and plaques on trunk and extremities  - scattered light brown pigmented macules without irregularity consistent on the torso and extremities  -No other lesions of concern on areas examined.   LYMPH: No cervical, submandibular, axillary or inguinal adenopathy    Impression/Plan:  1. CTCL; stage IIB Overall patient doing well and has responded well to the UVA1 treatment. Has several hypopigmented patches and plaques on exam. She has been doing twice weekly UVA1, her last treatment was on 12/22/17.     Continue UVA1 phototherapy at medium dose but will reduce the frequency to once weekly. One treatment to be given in clinic today.      Continue targretin 1% gel as needed to active lesions    Continue daily whole body moisturizing     Follow-up in 3 months, earlier for new or changing lesions.     Staff  Involved:  MD Priyanka Spann MD MPH  PGY3- [facility], Family Medicine  Pager- [phone]    .I, Arlen Guillory MD, saw this patient with the resident and agree with the resident s findings and plan of care as documented in the resident s note.    Again, thank you for allowing me to participate in the care of your patient.    Sincerely,  Arlen Guillory MD

## 2018-01-03 ENCOUNTER — OFFICE VISIT (OUTPATIENT)
Dept: DERMATOLOGY | Facility: CLINIC | Age: 68
End: 2018-01-03
Payer: COMMERCIAL

## 2018-01-03 DIAGNOSIS — C84.00 MYCOSIS FUNGOIDES, UNSPECIFIED BODY REGION (H): ICD-10-CM

## 2018-01-03 NOTE — NURSING NOTE
Lyndsey Hagen comes into clinic today at the request of Dr Guillory Ordering Provider for UVA-1    This service provided today was under the supervising provider of the day Dr Rader, who was available if needed.    Pooja Jordan RN

## 2018-01-03 NOTE — MR AVS SNAPSHOT
After Visit Summary   1/3/2018    Lyndsey Hagen    MRN: [id number]           Patient Information     Date Of Birth          1950        Visit Information        Provider Department      1/3/2018 7:20 AM UC DERM LIGHT TREATMENT [facility] Dermatology        Today's Diagnoses     Mycosis fungoides, unspecified body region (H)           Follow-ups after your visit        Your next 10 appointments already scheduled     Jan 12, 2018 10:00 AM CST   (Arrive by 9:45 AM)   Derm Light Extended with UC DERM LIGHT TREATMENT   [facility])    [address] [place] [address]   [phone]            Jan 18, 2018  9:50 AM CST   (Arrive by 9:35 AM)   Derm Light Extended with UC DERM LIGHT TREATMENT   [facility])    [address] [place] [address]   [phone]            Jan 25, 2018  9:50 AM CST   (Arrive by 9:35 AM)   Derm Light Extended with UC DERM LIGHT TREATMENT   [facility])    [address] [place] [address]   [phone]            Feb 12, 2018  8:00 AM CST   (Arrive by 7:45 AM)   Derm Light Extended with UC DERM LIGHT TREATMENT   [facility])    [address] [place] [address]   [phone]            Mar 01, 2018  8:00 AM CST   (Arrive by 7:45 AM)   Derm Light Extended with UC DERM LIGHT TREATMENT   [facility])    [address] [place] [address]   [phone]            Mar 05, 2018  7:30 AM CST   (Arrive by 7:15 AM)   Derm Light Extended with UC DERM LIGHT TREATMENT   [facility])    [address] [place] [address]   [phone]            Mar 12, 2018  7:30 AM CDT    (Arrive by 7:15 AM)   Derm Light Extended with UC DERM LIGHT TREATMENT   [facility] Dermatology ([facility])    [address] [address]   [phone]            Mar 19, 2018  7:30 AM CDT   (Arrive by 7:15 AM)   Derm Light Extended with UC DERM LIGHT TREATMENT   [facility] Dermatology ([facility])    [address] [address] [phone]            Mar 26, 2018  7:30 AM CDT   (Arrive by 7:15 AM)   Derm Light Extended with UC DERM LIGHT TREATMENT   [facility] Dermatology ([facility])    [address] [address] [phone]            Mar 27, 2018 11:30 AM CDT   (Arrive by 11:15 AM)   Return T-Cell Visit with Arlen Guillory MD   [facility] ([facility])    [address] [place] [id number] [phone]              Who to contact     Please call your clinic at [phone] to:    Ask questions about your health    Make or cancel appointments    Discuss your medicines    Learn about your test results    Speak to your doctor   If you have compliments or concerns about an experience at your clinic, or if you wish to file a complaint, please contact [facility] Patient Relations at [phone] or email us at [email or web address]         Additional Information About Your Visit        [employer] Point Partnershart Information     Cover gives you secure access to your electronic health record. If you see a primary care provider, you can also send messages to your care team and make appointments. If you have questions, please call your primary care clinic.  If you do not have a primary care provider, please call [phone] and they will assist you.      Cover is an electronic gateway that provides easy, online access to your medical records. With Cover, you  can request a clinic appointment, read your test results, renew a prescription or communicate with your care team.     To access your existing account, please contact your [facility] or call [phone] for assistance.        Care EveryWhere ID     This is your Care EveryWhere ID. This could be used by other organizations to access your [place] medical records  [id number]         Blood Pressure from Last 3 Encounters:   12/27/17 138/80   09/19/17 109/73   02/20/17 118/75    Weight from Last 3 Encounters:   05/23/16 73.5 kg (162 lb)   04/25/16 72.6 kg (160 lb)   02/22/16 76.1 kg (167 lb 12.8 oz)              We Performed the Following     CHARGE - PHOTOTHERAPY - UVA1     PROCEDURE - PHOTOTHERAPY UVA1        Primary Care Provider Office Phone # Fax #    April Rosalva [phone]       [place] [address]        Equal Access to Services     KARTHIK PAULA : Hadii aad ku hadasho Soomaali, waaxda luqadaha, qaybta kaalmada adeegyada, waxay idiin hayturnern chery miranda . So [facility] [phone].    ATENCIÓN: Si bernardala español, tiene a bustillos disposición servicios gratuitos de asistencia lingüística. Llame al [phone].    We comply with applicable federal civil rights laws and [place] laws. We do not discriminate on the basis of race, color, national origin, age, disability, sex, sexual orientation, or gender identity.            Thank you!     Thank you for choosing [facility] DERMATOLOGY  for your care. Our goal is always to provide you with excellent care. Hearing back from our patients is one way we can continue to improve our services. Please take a few minutes to complete the written survey that you may receive in the mail after your visit with us. Thank you!             Your Updated Medication List - Protect others around you: Learn how to safely use, store and throw away your medicines at www.disposemymeds.org.          This  list is accurate as of: 1/3/18  8:23 AM.  Always use your most recent med list.                   Brand Name Dispense Instructions for use Diagnosis    CO Q-10 PO      Take 1 tablet by mouth daily.        KRILL OIL PO      Take  by mouth daily.        MULTIVITAMINS PO      Take  by mouth daily.        SOLUBLE FIBER/PROBIOTICS PO      Take 2 tablets by mouth daily.        TARGRETIN 1 % Gel   Generic drug:  Bexarotene     60 g    APPLY EVERY OTHER NIGHT TO EVERY NIGHT AS DIRECTED    CTCL (cutaneous T-cell lymphoma) (H)       triamcinolone 0.1 % cream    KENALOG    454 g    Use in morning to rash    CTCL (cutaneous T-cell lymphoma) (H)       VITAMIN D-3 PO      Take 1 tablet by mouth daily.

## 2018-01-03 NOTE — PROGRESS NOTES
[facility] Dermatology Phototherapy Record  1. Lyndsey Hagen is a 67 year old female is here today for phototherapy (UVA-1) treatment for Mycosis fungoides, unspecified body region .        Changes or new medications since last treatment:   NO    New medical conditions or problems since last treatment:   NO    Any problems with last phototherapy treatment?    NO    2. The patient tolerated phototherapy without complication    Patient will return for next UVA treatment, per protocol.     Patient to see provider every 4-12 weeks for follow-up during treatment.      All questions and concerns discussed with patient in clinic today.      Pooja Jordan RN

## 2018-01-12 ENCOUNTER — OFFICE VISIT (OUTPATIENT)
Dept: DERMATOLOGY | Facility: CLINIC | Age: 68
End: 2018-01-12
Payer: COMMERCIAL

## 2018-01-12 DIAGNOSIS — C84.00 MYCOSIS FUNGOIDES, UNSPECIFIED BODY REGION (H): ICD-10-CM

## 2018-01-12 NOTE — PROGRESS NOTES
[facility] Dermatology Phototherapy Record  1. Lyndsey Hagen is a 67 year old female is here today for phototherapy (UVA-1) treatment for Mycosis fungoides, unspecified body region .        Changes or new medications since last treatment:   NO    New medical conditions or problems since last treatment:   NO    Any problems with last phototherapy treatment?    NO    2. The patient tolerated phototherapy without complication    Patient will return for next UVB treatment, per protocol.     Patient to see provider every 4-12 weeks for follow-up during treatment.      All questions and concerns discussed with patient in clinic today.      Pooja Jordan RN

## 2018-01-12 NOTE — MR AVS SNAPSHOT
After Visit Summary   1/12/2018    Lyndsey Hagen    MRN: [id number]           Patient Information     Date Of Birth          1950        Visit Information        Provider Department      1/12/2018 10:00 AM UC DERM LIGHT TREATMENT [facility] Dermatology        Today's Diagnoses     Mycosis fungoides, unspecified body region (H)           Follow-ups after your visit        Your next 10 appointments already scheduled     Jan 18, 2018  9:50 AM CST   (Arrive by 9:35 AM)   Derm Light Extended with UC DERM LIGHT TREATMENT   [facility])    [address] [place] [address]   [phone]            Jan 25, 2018  9:50 AM CST   (Arrive by 9:35 AM)   Derm Light Extended with UC DERM LIGHT TREATMENT   [facility])    [address] [place] [address]   [phone]            Feb 12, 2018  8:00 AM CST   (Arrive by 7:45 AM)   Derm Light Extended with UC DERM LIGHT TREATMENT   [facility])    [address] [place] [address]   [phone]            Mar 01, 2018  8:00 AM CST   (Arrive by 7:45 AM)   Derm Light Extended with UC DERM LIGHT TREATMENT   [facility])    [address] [place] [address]   [phone]            Mar 05, 2018  7:30 AM CST   (Arrive by 7:15 AM)   Derm Light Extended with UC DERM LIGHT TREATMENT   [facility])    [address] [place] [address]   [phone]            Mar 12, 2018  7:30 AM CDT   (Arrive by 7:15 AM)   Derm Light Extended with UC DERM LIGHT TREATMENT   [facility])    [address] [place] [address]   [phone]            Mar 19, 2018  7:30 AM CDT    (Arrive by 7:15 AM)   Derm Light Extended with UC DERM LIGHT TREATMENT   [facility] Dermatology ([facility])    [address] [address] [phone]            Mar 26, 2018  7:30 AM CDT   (Arrive by 7:15 AM)   Derm Light Extended with UC DERM LIGHT TREATMENT   [facility] Dermatology ([facility])    [address] [id number] [phone]            Mar 27, 2018 11:30 AM CDT   (Arrive by 11:15 AM)   Return T-Cell Visit with Arlen Guillory MD   [facility] Dermatology ([facility])    [address] [id number] [phone]              Who to contact     Please call your clinic at [phone] to:    Ask questions about your health    Make or cancel appointments    Discuss your medicines    Learn about your test results    Speak to your doctor   If you have compliments or concerns about an experience at your clinic, or if you wish to file a complaint, please contact [facility] Physicians Patient Relations at [phone] or email us at [email or web address]         Additional Information About Your Visit        XetalharNEXGRID Information     [employer] gives you secure access to your electronic health record. If you see a primary care provider, you can also send messages to your care team and make appointments. If you have questions, please call your primary care clinic.  If you do not have a primary care provider, please call [phone] and they will assist you.      MakeMeReach is an electronic gateway that provides easy, online access to your medical records. With MakeMeReach, you can request a clinic appointment, read your test results, renew a prescription or communicate with your care team.     To access your existing account, please contact your [facility] Clinic or call [phone] for  assistance.        Care EveryWhere ID     This is your Care EveryWhere ID. This could be used by other organizations to access your [place] medical records  [id number]         Blood Pressure from Last 3 Encounters:   12/27/17 138/80   09/19/17 109/73   02/20/17 118/75    Weight from Last 3 Encounters:   05/23/16 73.5 kg (162 lb)   04/25/16 72.6 kg (160 lb)   02/22/16 76.1 kg (167 lb 12.8 oz)              We Performed the Following     CHARGE - PHOTOTHERAPY - UVA1     PROCEDURE - PHOTOTHERAPY UVA1        Primary Care Provider Office Phone # Fax #    April Rosalva [phone]       [facility] [address] [place] [address]        Equal Access to Services     KARTHIK PAULA : Hadii aad ku hadasho Soomaali, waaxda luqadaha, qaybta kaalmada adeegyada, manuel miranda . So [facility] [phone].    ATENCIÓN: Si habla español, tiene a bustillos disposición servicios gratuitos de asistencia lingüística. Llame al [phone].    We comply with applicable federal civil rights laws and [place] laws. We do not discriminate on the basis of race, color, national origin, age, disability, sex, sexual orientation, or gender identity.            Thank you!     Thank you for choosing [facility] DERMATOLOGY  for your care. Our goal is always to provide you with excellent care. Hearing back from our patients is one way we can continue to improve our services. Please take a few minutes to complete the written survey that you may receive in the mail after your visit with us. Thank you!             Your Updated Medication List - Protect others around you: Learn how to safely use, store and throw away your medicines at www.disposemymeds.org.          This list is accurate as of: 1/12/18 12:24 PM.  Always use your most recent med list.                   Brand Name Dispense Instructions for use Diagnosis    CO Q-10 PO      Take 1 tablet by mouth daily.        KRILL OIL PO      Take  by mouth  daily.        MULTIVITAMINS PO      Take  by mouth daily.        SOLUBLE FIBER/PROBIOTICS PO      Take 2 tablets by mouth daily.        TARGRETIN 1 % Gel   Generic drug:  Bexarotene     60 g    APPLY EVERY OTHER NIGHT TO EVERY NIGHT AS DIRECTED    CTCL (cutaneous T-cell lymphoma) (H)       triamcinolone 0.1 % cream    KENALOG    454 g    Use in morning to rash    CTCL (cutaneous T-cell lymphoma) (H)       VITAMIN D-3 PO      Take 1 tablet by mouth daily.

## 2018-01-18 ENCOUNTER — OFFICE VISIT (OUTPATIENT)
Dept: DERMATOLOGY | Facility: CLINIC | Age: 68
End: 2018-01-18
Payer: COMMERCIAL

## 2018-01-18 DIAGNOSIS — C84.00 MYCOSIS FUNGOIDES, UNSPECIFIED BODY REGION (H): ICD-10-CM

## 2018-01-18 NOTE — NURSING NOTE
Lyndsey Hagen comes into clinic today at the request of Dr. Guillory Ordering Provider for UVA1    This service provided today was under the supervising provider of the day Dr. Guillory, who was available if needed.    Stephany Mercer

## 2018-01-18 NOTE — PROGRESS NOTES
[facility] Dermatology Phototherapy Record  1. Lyndsey Hagen is a 67 year old female is here today for phototherapy (UVA1) treatment for Mycosis fungoides, unspecified body region .        Changes or new medications since last treatment:   NO    New medical conditions or problems since last treatment:   NO    Any problems with last phototherapy treatment?    NO    2. The patient tolerated phototherapy without complication    Patient will return for next UVA1 treatment, per protocol.     Patient to see provider every 4-12 weeks for follow-up during treatment.      All questions and concerns discussed with patient in clinic today.      Stephany Mercer

## 2018-01-18 NOTE — MR AVS SNAPSHOT
After Visit Summary   1/18/2018    Lyndsey Hagen    MRN: [id number]           Patient Information     Date Of Birth          1950        Visit Information        Provider Department      1/18/2018 9:50 AM UC DERM LIGHT TREATMENT [facility] Dermatology        Today's Diagnoses     Mycosis fungoides, unspecified body region (H)           Follow-ups after your visit        Your next 10 appointments already scheduled     Jan 25, 2018  9:50 AM CST   (Arrive by 9:35 AM)   Derm Light Extended with UC DERM LIGHT TREATMENT   [facility])    [address] [place] [address]   [phone]            Feb 12, 2018  8:00 AM CST   (Arrive by 7:45 AM)   Derm Light Extended with UC DERM LIGHT TREATMENT   [facility])    [address] [place] [address]   [phone]            Mar 01, 2018  8:00 AM CST   (Arrive by 7:45 AM)   Derm Light Extended with UC DERM LIGHT TREATMENT   [facility])    [address] [place] [address]   [phone]            Mar 05, 2018  7:30 AM CST   (Arrive by 7:15 AM)   Derm Light Extended with UC DERM LIGHT TREATMENT   [facility])    [address] [place] [address]   [phone]            Mar 12, 2018  7:30 AM CDT   (Arrive by 7:15 AM)   Derm Light Extended with UC DERM LIGHT TREATMENT   [facility])    [address] [place] [address]   [phone]            Mar 19, 2018  7:30 AM CDT   (Arrive by 7:15 AM)   Derm Light Extended with UC DERM LIGHT TREATMENT   [facility])    [address] [place] [address]   [phone]            Mar 26, 2018  7:30 AM CDT    (Arrive by 7:15 AM)   Derm Light Extended with  DERM LIGHT TREATMENT   [facility] Dermatology ([facility])    [address] [facility]  3rd [facility] [id number] [phone]            Mar 27, 2018 11:30 AM CDT   (Arrive by 11:15 AM)   Return T-Cell Visit with Arlen Guillory MD   [facility] Dermatology ([facility])    [address] [facility]  [address] [facility] [address] [phone]              Who to contact     Please call your clinic at [phone] to:    Ask questions about your health    Make or cancel appointments    Discuss your medicines    Learn about your test results    Speak to your doctor   If you have compliments or concerns about an experience at your clinic, or if you wish to file a complaint, please contact [facility] Physicians Patient Relations at [phone] or email us at [email or web address]         Additional Information About Your Visit        ExcordaharOpenChime Information     [employer] gives you secure access to your electronic health record. If you see a primary care provider, you can also send messages to your care team and make appointments. If you have questions, please call your primary care clinic.  If you do not have a primary care provider, please call [phone] and they will assist you.      Sevcon is an electronic gateway that provides easy, online access to your medical records. With Sevcon, you can request a clinic appointment, read your test results, renew a prescription or communicate with your care team.     To access your existing account, please contact your [facility] Phys[facility] Clinic or call [phone] for assistance.        Care EveryWhere ID     This is your Care EveryWhere ID. This could be used by other organizations to access your [place] medical records  [id number]         Blood Pressure from Last 3 Encounters:   12/27/17 138/80   09/19/17 109/73    02/20/17 118/75    Weight from Last 3 Encounters:   05/23/16 73.5 kg (162 lb)   04/25/16 72.6 kg (160 lb)   02/22/16 76.1 kg (167 lb 12.8 oz)              We Performed the Following     CHARGE - PHOTOTHERAPY - UVA1     PROCEDURE - PHOTOTHERAPY UVA1        Primary Care Provider Office Phone # Fax #    April Rosalva [phone]       [facility] [address] [place] [address]        Equal Access to Services     KARTHIK PAULA : Hadii aad ku hadasho Soomaali, waaxda luqadaha, qaybta kaalmada adeegyada, waxay idiin hayaan chery miranda . So [facility] [phone].    ATENCIÓN: Si habla español, tiene a bustillos disposición servicios gratuitos de asistencia lingüística. Ll[facility] [phone].    We comply with applicable federal civil rights laws and [place] laws. We do not discriminate on the basis of race, color, national origin, age, disability, sex, sexual orientation, or gender identity.            Thank you!     Thank you for choosing [facility] DERMATOLOGY  for your care. Our goal is always to provide you with excellent care. Hearing back from our patients is one way we can continue to improve our services. Please take a few minutes to complete the written survey that you may receive in the mail after your visit with us. Thank you!             Your Updated Medication List - Protect others around you: Learn how to safely use, store and throw away your medicines at www.disposemymeds.org.          This list is accurate as of: 1/18/18 10:47 AM.  Always use your most recent med list.                   Brand Name Dispense Instructions for use Diagnosis    CO Q-10 PO      Take 1 tablet by mouth daily.        KRILL OIL PO      Take  by mouth daily.        MULTIVITAMINS PO      Take  by mouth daily.        SOLUBLE FIBER/PROBIOTICS PO      Take 2 tablets by mouth daily.        TARGRETIN 1 % Gel   Generic drug:  Bexarotene     60 g    APPLY EVERY OTHER NIGHT TO EVERY NIGHT AS DIRECTED    CTCL  (cutaneous T-cell lymphoma) (H)       triamcinolone 0.1 % cream    KENALOG    454 g    Use in morning to rash    CTCL (cutaneous T-cell lymphoma) (H)       VITAMIN D-3 PO      Take 1 tablet by mouth daily.

## 2018-01-25 ENCOUNTER — OFFICE VISIT (OUTPATIENT)
Dept: DERMATOLOGY | Facility: CLINIC | Age: 68
End: 2018-01-25
Payer: COMMERCIAL

## 2018-01-25 DIAGNOSIS — C84.00 MYCOSIS FUNGOIDES, UNSPECIFIED BODY REGION (H): ICD-10-CM

## 2018-01-25 NOTE — PROGRESS NOTES
[facility] Dermatology Phototherapy Record  1. Lyndsey Hagen is a 67 year old female is here today for phototherapy (UVA1) treatment for Mycosis fungoides, unspecified body region.        Changes or new medications since last treatment:   NO    New medical conditions or problems since last treatment:   NO    Any problems with last phototherapy treatment?    NO    2. The patient tolerated phototherapy without complication    Patient will return for next UVA1 treatment, per protocol.     Patient to see provider every 4-12 weeks for follow-up during treatment.      All questions and concerns discussed with patient in clinic today.      Stephany Mercer

## 2018-01-25 NOTE — MR AVS SNAPSHOT
After Visit Summary   1/25/2018    Lyndsey Hagen    MRN: [id number]           Patient Information     Date Of Birth          1950        Visit Information        Provider Department      1/25/2018 9:50 AM UC DERM LIGHT TREATMENT [facility] Dermatology        Today's Diagnoses     Mycosis fungoides, unspecified body region (H)           Follow-ups after your visit        Your next 10 appointments already scheduled     Feb 12, 2018  8:00 AM CST   (Arrive by 7:45 AM)   Derm Light Extended with UC DERM LIGHT TREATMENT   [facility])    [address] [place] [address]   [phone]            Mar 01, 2018  8:00 AM CST   (Arrive by 7:45 AM)   Derm Light Extended with UC DERM LIGHT TREATMENT   [facility])    [address] [place] [address]   [phone]            Mar 05, 2018  7:30 AM CST   (Arrive by 7:15 AM)   Derm Light Extended with UC DERM LIGHT TREATMENT   [facility])    [address] [place] [address]   [phone]            Mar 12, 2018  7:30 AM CDT   (Arrive by 7:15 AM)   Derm Light Extended with UC DERM LIGHT TREATMENT   [facility])    [address] [place] [address]   [phone]            Mar 19, 2018  7:30 AM CDT   (Arrive by 7:15 AM)   Derm Light Extended with UC DERM LIGHT TREATMENT   [facility])    [address] [place] [address]   [phone]            Mar 26, 2018  7:30 AM CDT   (Arrive by 7:15 AM)   Derm Light Extended with UC DERM LIGHT TREATMENT   [facility])    [address] [place] [address]   [phone]            Mar 27, 2018 11:30 AM CDT    (Arrive by 11:15 AM)   Return T-Cell Visit with Arlen Guillory MD   [facility] Dermatology ([facility])    [address] [facility]  [address] [facility] [address] [phone]              Who to contact     Please call your clinic at [phone] to:    Ask questions about your health    Make or cancel appointments    Discuss your medicines    Learn about your test results    Speak to your doctor   If you have compliments or concerns about an experience at your clinic, or if you wish to file a complaint, please contact [facility] Physicians Patient Relations at [phone] or email us at [email or web address]         Additional Information About Your Visit        Dial2DoharChatwala Information     [employer] gives you secure access to your electronic health record. If you see a primary care provider, you can also send messages to your care team and make appointments. If you have questions, please call your primary care clinic.  If you do not have a primary care provider, please call [phone] and they will assist you.      [employer] is an electronic gateway that provides easy, online access to your medical records. With [employer], you can request a clinic appointment, read your test results, renew a prescription or communicate with your care team.     To access your existing account, please contact your [facility] Clinic or call [phone] for assistance.        Care EveryWhere ID     This is your Care EveryWhere ID. This could be used by other organizations to access your [place] medical records  [id number]         Blood Pressure from Last 3 Encounters:   12/27/17 138/80   09/19/17 109/73   02/20/17 118/75    Weight from Last 3 Encounters:   05/23/16 73.5 kg (162 lb)   04/25/16 72.6 kg (160 lb)   02/22/16 76.1 kg (167 lb 12.8 oz)              We Performed the Following     CHARGE - PHOTOTHERAPY - UVA1     PROCEDURE - PHOTOTHERAPY UVA1         Primary Care Provider Office Phone # Fax #    April Rosalva [phone]       [facility] [address] [id number]        Equal Access to Services     KARTHIK PAULA : Hadii aad ku haddestinee Capellan, waniloda luqadaha, qaybta kaalmada sulma, manuel jimenezbeny valentin. So [facility] [phone].    ATENCIÓN: Si habla español, tiene a bustillos disposición servicios gratuitos de asistencia lingüística. Llame al [phone].    We comply with applicable federal civil rights laws and [place] laws. We do not discriminate on the basis of race, color, national origin, age, disability, sex, sexual orientation, or gender identity.            Thank you!     Thank you for choosing [facility] DERMATOLOGY  for your care. Our goal is always to provide you with excellent care. Hearing back from our patients is one way we can continue to improve our services. Please take a few minutes to complete the written survey that you may receive in the mail after your visit with us. Thank you!             Your Updated Medication List - Protect others around you: Learn how to safely use, store and throw away your medicines at www.disposemymeds.org.          This list is accurate as of 1/25/18 10:41 AM.  Always use your most recent med list.                   Brand Name Dispense Instructions for use Diagnosis    CO Q-10 PO      Take 1 tablet by mouth daily.        KRILL OIL PO      Take  by mouth daily.        MULTIVITAMINS PO      Take  by mouth daily.        SOLUBLE FIBER/PROBIOTICS PO      Take 2 tablets by mouth daily.        TARGRETIN 1 % Gel   Generic drug:  Bexarotene     60 g    APPLY EVERY OTHER NIGHT TO EVERY NIGHT AS DIRECTED    CTCL (cutaneous T-cell lymphoma) (H)       triamcinolone 0.1 % cream    KENALOG    454 g    Use in morning to rash    CTCL (cutaneous T-cell lymphoma) (H)       VITAMIN D-3 PO      Take 1 tablet by mouth daily.

## 2018-02-12 ENCOUNTER — OFFICE VISIT (OUTPATIENT)
Dept: DERMATOLOGY | Facility: CLINIC | Age: 68
End: 2018-02-12
Payer: COMMERCIAL

## 2018-02-12 DIAGNOSIS — C84.00 MYCOSIS FUNGOIDES, UNSPECIFIED BODY REGION (H): ICD-10-CM

## 2018-02-12 NOTE — NURSING NOTE
Lyndsey Hagen comes into clinic today at the request of Dr Guillory Ordering Provider for VA-1    This service provided today was under the supervising provider of the day Dr Rader, who was available if needed.    Pooja Jordan RN

## 2018-02-12 NOTE — PROGRESS NOTES
[facility] Dermatology Phototherapy Record  1. Lyndsey Hagen is a 67 year old female is here today for phototherapy (UVA-1) treatment for Mycosis fungoides, unspecified body region.        Changes or new medications since last treatment:   NO    New medical conditions or problems since last treatment:   NO    Any problems with last phototherapy treatment?    NO    2. The patient tolerated phototherapy without complication    Patient will return for next UVA treatment, per protocol.     Patient to see provider every 4-12 weeks for follow-up during treatment.      All questions and concerns discussed with patient in clinic today.      Pooja Jordan RN

## 2018-02-12 NOTE — MR AVS SNAPSHOT
After Visit Summary   2/12/2018    Lyndsey Hagen    MRN: [id number]           Patient Information     Date Of Birth          1950        Visit Information        Provider Department      2/12/2018 8:00 AM UC DERM LIGHT TREATMENT [facility]        Today's Diagnoses     Mycosis fungoides, unspecified body region (H)           Follow-ups after your visit        Your next 10 appointments already scheduled     Mar 01, 2018  8:00 AM CST   (Arrive by 7:45 AM)   Derm Light Extended with UC DERM LIGHT TREATMENT   [facility] ([facility])    [address] [place] [address]   [phone]            Mar 05, 2018  7:30 AM CST   (Arrive by 7:15 AM)   Derm Light Extended with UC DERM LIGHT TREATMENT   [facility])    [address] [place] [address]   [phone]            Mar 12, 2018  7:30 AM CDT   (Arrive by 7:15 AM)   Derm Light Extended with UC DERM LIGHT TREATMENT   [facility])    [address] [place] [address]   [phone]            Mar 19, 2018  7:30 AM CDT   (Arrive by 7:15 AM)   Derm Light Extended with UC DERM LIGHT TREATMENT   [facility] ([facility])    [address] [place] [address]   [phone]            Mar 26, 2018  7:30 AM CDT   (Arrive by 7:15 AM)   Derm Light Extended with UC DERM LIGHT TREATMENT   [facility])    [address] [place] [address]   [phone]            Mar 27, 2018 11:30 AM CDT   (Arrive by 11:15 AM)   Return T-Cell Visit with Arlen Guillory MD   [facility])    [address] [place] [address]   [phone]              Who to contact      Please call your clinic at [phone] to:    Ask questions about your health    Make or cancel appointments    Discuss your medicines    Learn about your test results    Speak to your doctor            Additional Information About Your Visit        Buddytrukhart Information     [employer] gives you secure access to your electronic health record. If you see a primary care provider, you can also send messages to your care team and make appointments. If you have questions, please call your primary care clinic.  If you do not have a primary care provider, please call [phone] and they will assist you.      [employer] is an electronic gateway that provides easy, online access to your medical records. With [employer], you can request a clinic appointment, read your test results, renew a prescription or communicate with your care team.     To access your existing account, please contact your [facility] or call [phone] for assistance.        Care EveryWhere ID     This is your Care EveryWhere ID. This could be used by other organizations to access your [facility] medical records  [id number]         Blood Pressure from Last 3 Encounters:   12/27/17 138/80   09/19/17 109/73   02/20/17 118/75    Weight from Last 3 Encounters:   05/23/16 73.5 kg (162 lb)   04/25/16 72.6 kg (160 lb)   02/22/16 76.1 kg (167 lb 12.8 oz)              We Performed the Following     CHARGE - PHOTOTHERAPY - UVA1     PROCEDURE - PHOTOTHERAPY UVA1        Primary Care Provider Office Phone # Fax #    April Rosalva [phone]       [facility] [address] [place] [address]        Equal Access to Services     KARTHIK PAULA : Hadii aad ku hadasho Soomaali, waaxda luqadaha, qaybta kaalmada adeegyada, waxay tr valentin. So [facility] [phone].    ATENCIÓN: Si habla español, tiene a bustillos disposición servicios gratuitos de asistencia lingüística. Llame al [phone].    We comply  with applicable federal civil rights laws and [place] laws. We do not discriminate on the basis of race, color, national origin, age, disability, sex, sexual orientation, or gender identity.            Thank you!     Thank you for choosing [facility]  for your care. Our goal is always to provide you with excellent care. Hearing back from our patients is one way we can continue to improve our services. Please take a few minutes to complete the written survey that you may receive in the mail after your visit with us. Thank you!             Your Updated Medication List - Protect others around you: Learn how to safely use, store and throw away your medicines at www.disposemymeds.org.          This list is accurate as of 2/12/18  8:25 AM.  Always use your most recent med list.                   Brand Name Dispense Instructions for use Diagnosis    CO Q-10 PO      Take 1 tablet by mouth daily.        KRILL OIL PO      Take  by mouth daily.        MULTIVITAMINS PO      Take  by mouth daily.        SOLUBLE FIBER/PROBIOTICS PO      Take 2 tablets by mouth daily.        TARGRETIN 1 % Gel   Generic drug:  Bexarotene     60 g    APPLY EVERY OTHER NIGHT TO EVERY NIGHT AS DIRECTED    CTCL (cutaneous T-cell lymphoma) (H)       triamcinolone 0.1 % cream    KENALOG    454 g    Use in morning to rash    CTCL (cutaneous T-cell lymphoma) (H)       VITAMIN D-3 PO      Take 1 tablet by mouth daily.

## 2018-03-01 ENCOUNTER — OFFICE VISIT (OUTPATIENT)
Dept: DERMATOLOGY | Facility: CLINIC | Age: 68
End: 2018-03-01
Payer: COMMERCIAL

## 2018-03-01 DIAGNOSIS — C84.00 MYCOSIS FUNGOIDES, UNSPECIFIED BODY REGION (H): ICD-10-CM

## 2018-03-01 NOTE — PROGRESS NOTES
[facility] Phototherapy Record  1. Lyndsey Hagen is a 67 year old female is here today for phototherapy (UVA-1) treatment for Mycosis fungoides, unspecified body region.        Changes or new medications since last treatment:   NO    New medical conditions or problems since last treatment:   NO    Any problems with last phototherapy treatment?    NO    2. The patient tolerated phototherapy without complication    Patient will return for next UVA treatment, per protocol.     Patient to see provider every 4-12 weeks for follow-up during treatment.      All questions and concerns discussed with patient in clinic today.      Pooja Jordan RN

## 2018-03-01 NOTE — NURSING NOTE
Lyndsey Hagen comes into clinic today at the request of Dr Guillory Ordering Provider for UVA-1    This service provided today was under the supervising provider of the day Dr Guillory, who was available if needed.    Pooja Jordan RN

## 2018-03-01 NOTE — MR AVS SNAPSHOT
After Visit Summary   3/1/2018    Lyndsey Hagen    MRN: [id number]           Patient Information     Date Of Birth          1950        Visit Information        Provider Department      3/1/2018 8:00 AM UC DERM LIGHT TREATMENT [facility]        Today's Diagnoses     Mycosis fungoides, unspecified body region (H)           Follow-ups after your visit        Your next 10 appointments already scheduled     Mar 05, 2018  7:30 AM CST   (Arrive by 7:15 AM)   Derm Light Extended with UC DERM LIGHT TREATMENT   [facility] ([facility])    [address] [place] [address]   [phone]            Mar 12, 2018  7:30 AM CDT   (Arrive by 7:15 AM)   Derm Light Extended with UC DERM LIGHT TREATMENT   [facility] ([facility])    [address] [place] [address]   [phone]            Mar 19, 2018  7:30 AM CDT   (Arrive by 7:15 AM)   Derm Light Extended with UC DERM LIGHT TREATMENT   [facility] ([facility])    [address] [place] [address]   [phone]            Mar 26, 2018  7:30 AM CDT   (Arrive by 7:15 AM)   Derm Light Extended with UC DERM LIGHT TREATMENT   [facility] ([facility])    [address] [place] [address] [phone]            Mar 27, 2018 11:30 AM CDT   (Arrive by 11:15 AM)   Return T-Cell Visit with Arlen Guillory MD   [facility])    [address] [place] [address] [phone]              Who to contact     Please call your clinic at [phone] to:    Ask questions about your health    Make or cancel appointments    Discuss your medicines    Learn about your test results    Speak to your doctor            Additional Information About Your Visit         S[employer] Information     [employer] gives you secure access to your electronic health record. If you see a primary care provider, you can also send messages to your care team and make appointments. If you have questions, please call your primary care clinic.  If you do not have a primary care provider, please call [phone] and they will assist you.      [employer] is an electronic gateway that provides easy, online access to your medical records. With [employer], you can request a clinic appointment, read your test results, renew a prescription or communicate with your care team.     To access your existing account, please contact your [facility] or call [phone] for assistance.        Care EveryWhere ID     This is your Care EveryWhere ID. This could be used by other organizations to access your [facility] medical records  [id number]         Blood Pressure from Last 3 Encounters:   12/27/17 138/80   09/19/17 109/73   02/20/17 118/75    Weight from Last 3 Encounters:   05/23/16 73.5 kg (162 lb)   04/25/16 72.6 kg (160 lb)   02/22/16 76.1 kg (167 lb 12.8 oz)              We Performed the Following     CHARGE - PHOTOTHERAPY - UVA1     PROCEDURE - PHOTOTHERAPY UVA1        Primary Care Provider Office Phone # Fax #    April Rosalva [phone]       [facility] [address] [place] [address]        Equal Access to Services     KARTHIK PAULA : Hadii camden riverao Soduarte, waaxda luqadaha, qaybta kaalmada manuel ozuna . So [facility] [phone].    ATENCIÓN: Si habla español, tiene a bustillos disposición servicios gratuitos de asistencia lingüística. Aden al [phone].    We comply with applicable federal civil rights laws and [place] laws. We do not discriminate on the basis of race, color, national origin, age, disability, sex, sexual orientation, or gender identity.            Thank you!     Thank you for choosing [facility]  for your care. Our goal is always to provide you with excellent care. Hearing back from our patients is one way we can continue to improve our services. Please take a few minutes to complete the written survey that you may receive in the mail after your visit with us. Thank you!             Your Updated Medication List - Protect others around you: Learn how to safely use, store and throw away your medicines at www.disposemymeds.org.          This list is accurate as of 3/1/18  9:01 AM.  Always use your most recent med list.                   Brand Name Dispense Instructions for use Diagnosis    CO Q-10 PO      Take 1 tablet by mouth daily.        KRILL OIL PO      Take  by mouth daily.        MULTIVITAMINS PO      Take  by mouth daily.        SOLUBLE FIBER/PROBIOTICS PO      Take 2 tablets by mouth daily.        TARGRETIN 1 % Gel   Generic drug:  Bexarotene     60 g    APPLY EVERY OTHER NIGHT TO EVERY NIGHT AS DIRECTED    CTCL (cutaneous T-cell lymphoma) (H)       triamcinolone 0.1 % cream    KENALOG    454 g    Use in morning to rash    CTCL (cutaneous T-cell lymphoma) (H)       VITAMIN D-3 PO      Take 1 tablet by mouth daily.

## 2018-03-05 ENCOUNTER — OFFICE VISIT (OUTPATIENT)
Dept: DERMATOLOGY | Facility: CLINIC | Age: 68
End: 2018-03-05
Payer: COMMERCIAL

## 2018-03-05 DIAGNOSIS — C84.00 MYCOSIS FUNGOIDES, UNSPECIFIED BODY REGION (H): ICD-10-CM

## 2018-03-05 NOTE — MR AVS SNAPSHOT
After Visit Summary   3/5/2018    Lyndsey Hagen    MRN: [id number]           Patient Information     Date Of Birth          1950        Visit Information        Provider Department      3/5/2018 7:30 AM UC DERM LIGHT TREATMENT [facility]        Today's Diagnoses     Mycosis fungoides, unspecified body region (H)           Follow-ups after your visit        Your next 10 appointments already scheduled     Mar 12, 2018  7:30 AM CDT   (Arrive by 7:15 AM)   Derm Light Extended with UC DERM LIGHT TREATMENT   [facility] ([facility])    [address] [place] [address]   [phone]            Mar 19, 2018  7:30 AM CDT   (Arrive by 7:15 AM)   Derm Light Extended with UC DERM LIGHT TREATMENT   [facility] ([facility])    [address] [place] [address]   [phone]            Mar 26, 2018  7:30 AM CDT   (Arrive by 7:15 AM)   Derm Light Extended with UC DERM LIGHT TREATMENT   [facility])    [address] [place] [address]   [phone]            Mar 27, 2018 11:30 AM CDT   (Arrive by 11:15 AM)   Return T-Cell Visit with Arlen Guillory MD   [facility])    [address] [place] [address] [phone]              Who to contact     Please call your clinic at [phone] to:    Ask questions about your health    Make or cancel appointments    Discuss your medicines    Learn about your test results    Speak to your doctor            Additional Information About Your Visit        MyChart Information     [employer] gives you secure access to your electronic health record. If you see a primary care provider, you can also send messages to your care team and make appointments. If you have questions, please call your primary care  clinic.  If you do not have a primary care provider, please call [phone] and they will assist you.      [employer] is an electronic gateway that provides easy, online access to your medical records. With [employer], you can request a clinic appointment, read your test results, renew a prescription or communicate with your care team.     To access your existing account, please contact your [facility] or call [phone] for assistance.        Care EveryWhere ID     This is your Care EveryWhere ID. This could be used by other organizations to access your [facility] medical records  [id number]         Blood Pressure from Last 3 Encounters:   12/27/17 138/80   09/19/17 109/73   02/20/17 118/75    Weight from Last 3 Encounters:   05/23/16 73.5 kg (162 lb)   04/25/16 72.6 kg (160 lb)   02/22/16 76.1 kg (167 lb 12.8 oz)              We Performed the Following     CHARGE - PHOTOTHERAPY - UVA1     PROCEDURE - PHOTOTHERAPY UVA1        Primary Care Provider Office Phone # Fax #    April Rosalva [phone]       [facility] [address] [place] [address]        Equal Access to Services     KATRHIK PAULA AH: Hadii aad ku hadasho Sogigiali, waaxda luqadaha, qaybta kaalmada adeegyada, waxay tr valentin. So [facility] [phone].    ATENCIÓN: Si habla español, tiene a bustillos disposición servicios gratuitos de asistencia lingüística. Llame al [phone].    We comply with applicable federal civil rights laws and [place] laws. We do not discriminate on the basis of race, color, national origin, age, disability, sex, sexual orientation, or gender identity.            Thank you!     Thank you for choosing [facility]  for your care. Our goal is always to provide you with excellent care. Hearing back from our patients is one way we can continue to improve our services. Please take a few minutes to complete the written survey that you may receive in  the mail after your visit with us. Thank you!             Your Updated Medication List - Protect others around you: Learn how to safely use, store and throw away your medicines at www.disposemymeds.org.          This list is accurate as of 3/5/18  7:48 AM.  Always use your most recent med list.                   Brand Name Dispense Instructions for use Diagnosis    CO Q-10 PO      Take 1 tablet by mouth daily.        KRILL OIL PO      Take  by mouth daily.        MULTIVITAMINS PO      Take  by mouth daily.        SOLUBLE FIBER/PROBIOTICS PO      Take 2 tablets by mouth daily.        TARGRETIN 1 % Gel   Generic drug:  Bexarotene     60 g    APPLY EVERY OTHER NIGHT TO EVERY NIGHT AS DIRECTED    CTCL (cutaneous T-cell lymphoma) (H)       triamcinolone 0.1 % cream    KENALOG    454 g    Use in morning to rash    CTCL (cutaneous T-cell lymphoma) (H)       VITAMIN D-3 PO      Take 1 tablet by mouth daily.

## 2018-03-05 NOTE — NURSING NOTE
Lyndsey Hagen comes into clinic today at the request of Dr. Guillory Ordering Provider for UVA-1.        This service provided today was under the supervising provider of the day Dr. Rader, who was available if needed.    Isela Grossman LPN

## 2018-03-05 NOTE — PROGRESS NOTES
[facility] Dermat[facility] Phototherapy Record  1. Lyndsey Hagen is a 67 year old female is here today for phototherapy (UVA-1) treatment for mycosis fungoides.        Changes or new medications since last treatment:   NO    New medical conditions or problems since last treatment:   NO    Any problems with last phototherapy treatment?    NO    2. The patient tolerated phototherapy without complication    Patient will return for next UVA-1 treatment, per protocol.     Patient to see provider every 4-12 weeks for follow-up during treatment.      All questions and concerns discussed with patient in clinic today.      Isela Grossman LPN

## 2018-03-12 ENCOUNTER — RECORDS - HEALTHEAST (OUTPATIENT)
Dept: ADMINISTRATIVE | Facility: OTHER | Age: 68
End: 2018-03-12

## 2018-03-12 ENCOUNTER — OFFICE VISIT (OUTPATIENT)
Dept: DERMATOLOGY | Facility: CLINIC | Age: 68
End: 2018-03-12
Payer: COMMERCIAL

## 2018-03-12 VITALS — HEART RATE: 72 BPM | SYSTOLIC BLOOD PRESSURE: 136 MMHG | DIASTOLIC BLOOD PRESSURE: 94 MMHG

## 2018-03-12 DIAGNOSIS — C84.00 MYCOSIS FUNGOIDES, UNSPECIFIED BODY REGION (H): Primary | ICD-10-CM

## 2018-03-12 ASSESSMENT — PAIN SCALES - GENERAL: PAINLEVEL: NO PAIN (0)

## 2018-03-12 NOTE — LETTER
3/12/2018       RE: Lyndsey Hagen  [address]  [place] [address]     Dear Colleague,    Thank you for referring your patient, Lyndsey Hgaen, to the [facility] DERMATOLOG[facility] at [facility]. Please see a copy of my visit note below.    [facility] Dermatology Note      Dermatology Problem List:  1. CTCL, stage IIB  -Previously treated with PUVA with good response. Treated at [facility] prior to establishing care here. Stopped due to difficulty percuring oxsoralen and risks associated with PUVA.   Restarted on UVA1 with clinical remission.  Discontinue UVA1 3/12/2018  -Temporarily discontinued UVA1 (4/26/17) due to insurance  -Targretin 1% gel (added on 5/23/16) with good response.   -Triamcinolone 0.1% cream as needed for irritation from targretin gel.    CC:   Chief Complaint   Patient presents with     Skin Check     Lyndsey is here for skin check for t-cell         Encounter Date: Mar 12, 2018    History of Present Illness:  Ms. Lyndsey Hagen is a 67 year old female who returns for follow up of stage IIB CTCL.  Since our last visit 3 months ago, she has been travelling to [place], [place], and [place].  She has missed several weeks of UVA1 phototherapy but her skin is doing very well.  She has not noted any active lesions.  She does have upcoming travel to [place] in the next few weeks.  She has Targretin gel and triamcinolone to use topically but no areas to apply them to at this time.  Three weeks ago she had a viral URI but feels well at this time.    Past Medical History:   Patient Active Problem List   Diagnosis     Cutaneous T-cell lymphoma (H)     Past Medical History:   Diagnosis Date     Cutaneous T-cell lymphoma (H)      Past Surgical History:   Procedure Laterality Date     NO HISTORY OF SURGERY  10/28/31    derm       Social History:      Family History:      Medications:  Current Outpatient Prescriptions   Medication Sig Dispense Refill     TARGRETIN 1 %  GEL APPLY EVERY OTHER NIGHT TO EVERY NIGHT AS DIRECTED 60 g 5     triamcinolone (KENALOG) 0.1 % cream Use in morning to rash 454 g 5     Multiple Vitamin (MULTIVITAMINS PO) Take  by mouth daily.       Probiotic Product (SOLUBLE FIBER/PROBIOTICS PO) Take 2 tablets by mouth daily.       KRILL OIL PO Take  by mouth daily.       Cholecalciferol (VITAMIN D-3 PO) Take 1 tablet by mouth daily.       Coenzyme Q10 (CO Q-10 PO) Take 1 tablet by mouth daily.       Allergies   Allergen Reactions     Nkda [No Known Drug Allergies]          Review of Systems:  -As per HPI    -Skin: As above in HPI. No additional skin concerns.    Physical exam:  Vitals: BP (!) 136/94  Pulse 72  GEN: This is a well developed, well-nourished female in no acute distress, in a pleasant mood.    SKIN: Full skin, which includes the head/face, both arms, chest, back, abdomen,both legs, genitalia and/or groin buttocks, digits and/or nails, was examined.  -There is xerosis of the body with deep tanning and white macules (IGH).   -No active lymphoma lesions  -Shoddy posterior cervical chain lymph nodes.  No submandibular, axillary or inguinal nodes by palpation.  -No other lesions of concern on areas examined.     Impression/Plan:  1. CTCL stage IIB - clinical remission    Will stop phototherapy at this time but have small threshold to restart as she has flared in past when phototherapy was discontinued.    TAC and Targretin gel prn    Discussed lymph nodes with patient.  Suspect viral URI as culprit.        CC Dr. Riggs on close of this encounter.  Follow-up in 3 months, earlier for new or changing lesions.       Staff Involved:  Staff Only      Arlen Guillory MD

## 2018-03-12 NOTE — LETTER
3/12/2018      RE: Lyndsey Hagen  [address]  [facility] [address]       [facility] Dermatology Note      Dermatology Problem List:  1. CTCL, stage IIB  -Previously treated with PUVA with good response. Treated at [place] prior to establishing care here. Stopped due to difficulty percuring oxsoralen and risks associated with PUVA.   Restarted on UVA1 with clinical remission.  Discontinue UVA1 3/12/2018  -Temporarily discontinued UVA1 (4/26/17) due to insurance  -Targretin 1% gel (added on 5/23/16) with good response.   -Triamcinolone 0.1% cream as needed for irritation from targretin gel.    CC:   Chief Complaint   Patient presents with     Skin Check     Lyndsey is here for skin check for t-cell         Encounter Date: Mar 12, 2018    History of Present Illness:  Ms. Lyndsey Hagen is a 67 year old female who returns for follow up of stage IIB CTCL.  Since our last visit 3 months ago, she has been travelling to [place], [place], and [place].  She has missed several weeks of UVA1 phototherapy but her skin is doing very well.  She has not noted any active lesions.  She does have upcoming travel to [place] in the next few weeks.  She has Targretin gel and triamcinolone to use topically but no areas to apply them to at this time.  Three weeks ago she had a viral URI but feels well at this time.    Past Medical History:   Patient Active Problem List   Diagnosis     Cutaneous T-cell lymphoma (H)     Past Medical History:   Diagnosis Date     Cutaneous T-cell lymphoma (H)      Past Surgical History:   Procedure Laterality Date     NO HISTORY OF SURGERY  10/28/31    derm       Social History:      Family History:      Medications:  Current Outpatient Prescriptions   Medication Sig Dispense Refill     TARGRETIN 1 % GEL APPLY EVERY OTHER NIGHT TO EVERY NIGHT AS DIRECTED 60 g 5     triamcinolone (KENALOG) 0.1 % cream Use in morning to rash 454 g 5     Multiple Vitamin (MULTIVITAMINS PO) Take  by mouth  daily.       Probiotic Product (SOLUBLE FIBER/PROBIOTICS PO) Take 2 tablets by mouth daily.       KRILL OIL PO Take  by mouth daily.       Cholecalciferol (VITAMIN D-3 PO) Take 1 tablet by mouth daily.       Coenzyme Q10 (CO Q-10 PO) Take 1 tablet by mouth daily.       Allergies   Allergen Reactions     Nkda [No Known Drug Allergies]          Review of Systems:  -As per HPI    -Skin: As above in HPI. No additional skin concerns.    Physical exam:  Vitals: BP (!) 136/94  Pulse 72  GEN: This is a well developed, well-nourished female in no acute distress, in a pleasant mood.    SKIN: Full skin, which includes the head/face, both arms, chest, back, abdomen,both legs, genitalia and/or groin buttocks, digits and/or nails, was examined.  -There is xerosis of the body with deep tanning and white macules (IGH).   -No active lymphoma lesions  -Shoddy posterior cervical chain lymph nodes.  No submandibular, axillary or inguinal nodes by palpation.  -No other lesions of concern on areas examined.     Impression/Plan:  1. CTCL stage IIB - clinical remission    Will stop phototherapy at this time but have small threshold to restart as she has flared in past when phototherapy was discontinued.    TAC and Targretin gel prn    Discussed lymph nodes with patient.  Suspect viral URI as culprit.        CC Dr. Riggs on close of this encounter.  Follow-up in 3 months, earlier for new or changing lesions.       Staff Involved:  Staff Only    Arlen Guillory MD

## 2018-03-12 NOTE — NURSING NOTE
Chief Complaint   Patient presents with     Skin Check     Lyndsey is here for skin check for t-cell     Pt roomed, vitals, meds, and allergies reviewed with pt. Pt ready for provider.  Eric Sommers, CMA

## 2018-03-12 NOTE — PROGRESS NOTES
[facility] Dermatology Note      Dermatology Problem List:  1. CTCL, stage IIB  -Previously treated with PUVA with good response. Treated at [place] prior to establishing care here. Stopped due to difficulty percuring oxsoralen and risks associated with PUVA.  Restarted on UVA1 with clinical remission.  Discontinue UVA1 3/12/2018  -Temporarily discontinued UVA1 (4/26/17) due to insurance  -Targretin 1% gel (added on 5/23/16) with good response.   -Triamcinolone 0.1% cream as needed for irritation from targretin gel.    CC:   Chief Complaint   Patient presents with     Skin Check     Lyndsey nowak here for skin check for t-cell         Encounter Date: Mar 12, 2018    History of Present Illness:  Ms. Lyndsey Hagen is a 67 year old female who returns for follow up of stage IIB CTCL.  Since our last visit 3 months ago, she has been travelling to [place], [place], and [place].  She has missed several weeks of UVA1 phototherapy but her skin is doing very well.  She has not noted any active lesions.  She does have upcoming travel to [place] in the next few weeks.  She has Targretin gel and triamcinolone to use topically but no areas to apply them to at this time.  Three weeks ago she had a viral URI but feels well at this time.    Past Medical History:   Patient Active Problem List   Diagnosis     Cutaneous T-cell lymphoma (H)     Past Medical History:   Diagnosis Date     Cutaneous T-cell lymphoma (H)      Past Surgical History:   Procedure Laterality Date     NO HISTORY OF SURGERY  10/28/31    derm       Social History:      Family History:      Medications:  Current Outpatient Prescriptions   Medication Sig Dispense Refill     TARGRETIN 1 % GEL APPLY EVERY OTHER NIGHT TO EVERY NIGHT AS DIRECTED 60 g 5     triamcinolone (KENALOG) 0.1 % cream Use in morning to rash 454 g 5     Multiple Vitamin (MULTIVITAMINS PO) Take  by mouth daily.       Probiotic Product (SOLUBLE FIBER/PROBIOTICS PO) Take 2 tablets by mouth  daily.       KRILL OIL PO Take  by mouth daily.       Cholecalciferol (VITAMIN D-3 PO) Take 1 tablet by mouth daily.       Coenzyme Q10 (CO Q-10 PO) Take 1 tablet by mouth daily.       Allergies   Allergen Reactions     Nkda [No Known Drug Allergies]          Review of Systems:  -As per HPI    -Skin: As above in HPI. No additional skin concerns.    Physical exam:  Vitals: BP (!) 136/94  Pulse 72  GEN: This is a well developed, well-nourished female in no acute distress, in a pleasant mood.    SKIN: Full skin, which includes the head/face, both arms, chest, back, abdomen,both legs, genitalia and/or groin buttocks, digits and/or nails, was examined.  -There is xerosis of the body with deep tanning and white macules (IGH).   -No active lymphoma lesions  -Shoddy posterior cervical chain lymph nodes.  No submandibular, axillary or inguinal nodes by palpation.  -No other lesions of concern on areas examined.     Impression/Plan:  1. CTCL stage IIB - clinical remission    Will stop phototherapy at this time but have small threshold to restart as she has flared in past when phototherapy was discontinued.    TAC and Targretin gel prn    Discussed lymph nodes with patient.  Suspect viral URI as culprit.        CC Dr. Riggs on close of this encounter.  Follow-up in 3 months, earlier for new or changing lesions.       Staff Involved:  Staff Only

## 2018-03-12 NOTE — MR AVS SNAPSHOT
After Visit Summary   3/12/2018    Lyndsey Hagen    MRN: [id number]           Patient Information     Date Of Birth          1950        Visit Information        Provider Department      3/12/2018 11:30 AM Arlen Guillory MD [facility] Dermatology        Today's Diagnoses     Mycosis fungoides, unspecified body region (H)    -  1       Follow-ups after your visit        Your next 10 appointments already scheduled     Jun 12, 2018 11:30 AM CDT   (Arrive by 11:15 AM)   Return T-Cell Visit with Arlen Guillory MD   [facility] Dermatology ([facility])    [address] [place] [address] [phone]              Who to contact     Please call your clinic at [phone] to:    Ask questions about your health    Make or cancel appointments    Discuss your medicines    Learn about your test results    Speak to your doctor            Additional Information About Your Visit        MyChart Information     [employer] gives you secure access to your electronic health record. If you see a primary care provider, you can also send messages to your care team and make appointments. If you have questions, please call your primary care clinic.  If you do not have a primary care provider, please call [phone] and they will assist you.      [employer] is an electronic gateway that provides easy, online access to your medical records. With [employer], you can request a clinic appointment, read your test results, renew a prescription or communicate with your care team.     To access your existing account, please contact your [facility] or call [phone] for assistance.        Care EveryWhere ID     This is your Care EveryWhere ID. This could be used by other organizations to access your [place] medical records  [id number]        Your Vitals Were     Pulse                   72            Blood Pressure from Last 3 Encounters:    03/12/18 (!) 136/94   12/27/17 138/80   09/19/17 109/73    Weight from Last 3 Encounters:   05/23/16 73.5 kg (162 lb)   04/25/16 72.6 kg (160 lb)   02/22/16 76.1 kg (167 lb 12.8 oz)              Today, you had the following     No orders found for display       Primary Care Provider Office Phone # Fax #    April Rosalva [phone]       [facility] [address] [facility] [address]        Equal Access to Services     [facility]: Hadii camden villarreal hadasho Soduarte, waaxda luqadaha, qaybta kaalmada ademilena, manuel miranda . So [facility] [phone].    ATENCIÓN: Si habla español, tiene a bustillos disposición servicios gratuitos de asistencia lingüística. [facility] [phone].    We comply with applicable federal civil rights laws and [place] laws. We do not discriminate on the basis of race, color, national origin, age, disability, sex, sexual orientation, or gender identity.            Thank you!     Thank you for choosing [facility] DERMATOLOGY  for your care. Our goal is always to provide you with excellent care. Hearing back from our patients is one way we can continue to improve our services. Please take a few minutes to complete the written survey that you may receive in the mail after your visit with us. Thank you!             Your Updated Medication List - Protect others around you: Learn how to safely use, store and throw away your medicines at www.disposemymeds.org.          This list is accurate as of 3/12/18 12:14 PM.  Always use your most recent med list.                   Brand Name Dispense Instructions for use Diagnosis    CO Q-10 PO      Take 1 tablet by mouth daily.        KRILL OIL PO      Take  by mouth daily.        MULTIVITAMINS PO      Take  by mouth daily.        SOLUBLE FIBER/PROBIOTICS PO      Take 2 tablets by mouth daily.        TARGRETIN 1 % Gel   Generic drug:  Bexarotene     60 g    APPLY EVERY OTHER NIGHT TO EVERY NIGHT AS DIRECTED     CTCL (cutaneous T-cell lymphoma) (H)       triamcinolone 0.1 % cream    KENALOG    454 g    Use in morning to rash    CTCL (cutaneous T-cell lymphoma) (H)       VITAMIN D-3 PO      Take 1 tablet by mouth daily.

## 2018-05-13 ENCOUNTER — RECORDS - HEALTHEAST (OUTPATIENT)
Dept: ADMINISTRATIVE | Facility: OTHER | Age: 68
End: 2018-05-13

## 2018-05-21 ENCOUNTER — RECORDS - HEALTHEAST (OUTPATIENT)
Dept: ADMINISTRATIVE | Facility: OTHER | Age: 68
End: 2018-05-21

## 2018-06-04 ENCOUNTER — RECORDS - HEALTHEAST (OUTPATIENT)
Dept: ADMINISTRATIVE | Facility: OTHER | Age: 68
End: 2018-06-04

## 2018-06-04 ENCOUNTER — COMMUNICATION - HEALTHEAST (OUTPATIENT)
Dept: INTERNAL MEDICINE | Facility: CLINIC | Age: 68
End: 2018-06-04

## 2018-06-04 DIAGNOSIS — Z78.0 MENOPAUSE: ICD-10-CM

## 2018-06-08 ENCOUNTER — RECORDS - HEALTHEAST (OUTPATIENT)
Dept: ADMINISTRATIVE | Facility: OTHER | Age: 68
End: 2018-06-08

## 2018-06-08 ENCOUNTER — RECORDS - HEALTHEAST (OUTPATIENT)
Dept: BONE DENSITY | Facility: CLINIC | Age: 68
End: 2018-06-08

## 2018-06-08 DIAGNOSIS — Z78.0 ASYMPTOMATIC MENOPAUSAL STATE: ICD-10-CM

## 2018-06-12 ENCOUNTER — OFFICE VISIT (OUTPATIENT)
Dept: DERMATOLOGY | Facility: CLINIC | Age: 68
End: 2018-06-12
Payer: COMMERCIAL

## 2018-06-12 ENCOUNTER — RECORDS - HEALTHEAST (OUTPATIENT)
Dept: ADMINISTRATIVE | Facility: OTHER | Age: 68
End: 2018-06-12

## 2018-06-12 VITALS — DIASTOLIC BLOOD PRESSURE: 83 MMHG | SYSTOLIC BLOOD PRESSURE: 127 MMHG | HEART RATE: 63 BPM

## 2018-06-12 DIAGNOSIS — C84.00 MYCOSIS FUNGOIDES, UNSPECIFIED BODY REGION (H): Primary | ICD-10-CM

## 2018-06-12 ASSESSMENT — PAIN SCALES - GENERAL: PAINLEVEL: NO PAIN (0)

## 2018-06-12 NOTE — NURSING NOTE
Dermatology Rooming Note    Lyndsey Hagen's goals for this visit include:   Chief Complaint   Patient presents with     Derm Problem     Lyndsey is here for a T cell skin check, states no concerns today.        Rhonda Cerrato LPN

## 2018-06-12 NOTE — MR AVS SNAPSHOT
After Visit Summary   6/12/2018    Lyndsey Hagen    MRN: [id number]           Patient Information     Date Of Birth          1950        Visit Information        Provider Department      6/12/2018 11:30 AM Arlen Guillory MD [facility] Dermatology        Today's Diagnoses     Mycosis fungoides, unspecified body region (H)    -  1       Follow-ups after your visit        Your next 10 appointments already scheduled     Sep 19, 2018 11:30 AM CDT   (Arrive by 11:15 AM)   Return T-Cell Visit with Arlen Guillory MD   [facility] Dermatology ([facility])    [address] [place] [address] [phone]              Who to contact     Please call your clinic at [phone] to:    Ask questions about your health    Make or cancel appointments    Discuss your medicines    Learn about your test results    Speak to your doctor            Additional Information About Your Visit        MyChart Information     [employer] gives you secure access to your electronic health record. If you see a primary care provider, you can also send messages to your care team and make appointments. If you have questions, please call your primary care clinic.  If you do not have a primary care provider, please call [phone] and they will assist you.      [employer] is an electronic gateway that provides easy, online access to your medical records. With [employer], you can request a clinic appointment, read your test results, renew a prescription or communicate with your care team.     To access your existing account, please contact your [facility] or call [phone] for assistance.        Care EveryWhere ID     This is your Care EveryWhere ID. This could be used by other organizations to access your [place] medical records  [id number]        Your Vitals Were     Pulse                   63            Blood Pressure from Last 3 Encounters:    06/12/18 127/83   03/12/18 (!) 136/94   12/27/17 138/80    Weight from Last 3 Encounters:   05/23/16 73.5 kg (162 lb)   04/25/16 72.6 kg (160 lb)   02/22/16 76.1 kg (167 lb 12.8 oz)              Today, you had the following     No orders found for display       Primary Care Provider Office Phone # Fax #    April Rosalva [phone]       [facility] [address] [facility] [address]        Equal Access to Services     [facility]: Hadii camden villarreal hadasho Soduarte, waaxda luqadaha, qaybta kaalmada ademilena, manuel miranda . So [facility] [phone].    ATENCIÓN: Si habla español, tiene a bustillos disposición servicios gratuitos de asistencia lingüística. [facility] [phone].    We comply with applicable federal civil rights laws and [place] laws. We do not discriminate on the basis of race, color, national origin, age, disability, sex, sexual orientation, or gender identity.            Thank you!     Thank you for choosing [facility] DERMATOLOGY  for your care. Our goal is always to provide you with excellent care. Hearing back from our patients is one way we can continue to improve our services. Please take a few minutes to complete the written survey that you may receive in the mail after your visit with us. Thank you!             Your Updated Medication List - Protect others around you: Learn how to safely use, store and throw away your medicines at www.disposemymeds.org.          This list is accurate as of 6/12/18 11:57 AM.  Always use your most recent med list.                   Brand Name Dispense Instructions for use Diagnosis    CO Q-10 PO      Take 1 tablet by mouth daily.        KRILL OIL PO      Take  by mouth daily.        MULTIVITAMINS PO      Take  by mouth daily.        SOLUBLE FIBER/PROBIOTICS PO      Take 2 tablets by mouth daily.        TARGRETIN 1 % Gel   Generic drug:  Bexarotene     60 g    APPLY EVERY OTHER NIGHT TO EVERY NIGHT AS DIRECTED     CTCL (cutaneous T-cell lymphoma) (H)       triamcinolone 0.1 % cream    KENALOG    454 g    Use in morning to rash    CTCL (cutaneous T-cell lymphoma) (H)       VITAMIN D-3 PO      Take 1 tablet by mouth daily.

## 2018-06-12 NOTE — PROGRESS NOTES
[facility] Dermatology Note      Dermatology Problem List:  1. CTCL, stage IIB  -Previously treated with PUVA with good response. Treated at [facility] prior to establishing care here. Stopped due to difficulty percuring oxsoralen and risks associated with PUVA.  Restarted on UVA1 with clinical remission.  Discontinue UVA1 3/12/2018  -Temporarily discontinued UVA1 (4/26/17) due to insurance  -Targretin 1% gel (added on 5/23/16) with good response.   -Triamcinolone 0.1% cream as needed for irritation from targretin gel.    CC:   Chief Complaint   Patient presents with     Derm Problem     Lyndsey is here for a T cell skin check, states no concerns today.          Encounter Date: Jun 12, 2018    History of Present Illness:  Ms. Lyndsey Hagen is a 67 year old female who returns for follow up of CTCL.  She feels her skin is doing well despite stopping UVA1 phototherapy.  She occasionally gets itchy spots and applies the Targretin gel to the areas and they resolve.  She did break her left wrist since our left visit and notes a recent red spot on her forearm after they removed the splint.  She has been monitoring her lymph nodes since our last visit and has not had any new lumps.    Past Medical History:   Patient Active Problem List   Diagnosis     Cutaneous T-cell lymphoma (H)     Past Medical History:   Diagnosis Date     Cutaneous T-cell lymphoma (H)      Past Surgical History:   Procedure Laterality Date     NO HISTORY OF SURGERY  10/28/31    derm           Medications:  Current Outpatient Prescriptions   Medication Sig Dispense Refill     Cholecalciferol (VITAMIN D-3 PO) Take 1 tablet by mouth daily.       Coenzyme Q10 (CO Q-10 PO) Take 1 tablet by mouth daily.       KRILL OIL PO Take  by mouth daily.       Multiple Vitamin (MULTIVITAMINS PO) Take  by mouth daily.       Probiotic Product (SOLUBLE FIBER/PROBIOTICS PO) Take 2 tablets by mouth daily.       TARGRETIN 1 % GEL APPLY EVERY OTHER NIGHT TO EVERY NIGHT  AS DIRECTED 60 g 5     triamcinolone (KENALOG) 0.1 % cream Use in morning to rash (Patient not taking: Reported on 6/12/2018) 454 g 5     Allergies   Allergen Reactions     Nkda [No Known Drug Allergies]          Review of Systems:  -As per HPI.    Physical exam:  Vitals: /83 (BP Location: Right arm, Patient Position: Chair, Cuff Size: Adult Regular)  Pulse 63  GEN: This is a well developed, well-nourished female in no acute distress, in a pleasant mood.    SKIN: Full skin, which includes the head/face, both arms, chest, back, abdomen,both legs, genitalia and/or groin buttocks, digits and/or nails, was examined.  -few red macules of upper back and arms.  Left wrist has a stuck on bright red papule  -There is no lymphadenopathy on palpation of cervical, post auricular, occipital, axillary, inguinal, and popliteal nodes.  -deeply tanned with white macules  -No other lesions of concern on areas examined.     Impression/Plan:  1. CTCL stage IIB- continues in clinical remission    Occasional red areas do not appear to be active lymphoma and spot on wrist has appearance of a BLK, but I have asked the patient to monitor this area since it is new    Hold phototherapy and continue Targretin gel and triamcinolone as needed    CC Dr. Riggs on close of this encounter.  Follow-up in 3 months, earlier for new or changing lesions.       Staff Involved:  Staff Only

## 2018-06-12 NOTE — LETTER
6/12/2018       RE: Lyndsey Hagen  [address]  [place] [address]     Dear Colleague,    Thank you for referring your patient, Lyndsey Hagen, to the [facility] DERMATOLOGY at [facility]. Please see a copy of my visit note below.    [facility] Dermatology Note      Dermatology Problem List:  1. CTCL, stage IIB  -Previously treated with PUVA with good response. Treated at [place] prior to establishing care here. Stopped due to difficulty percuring oxsoralen and risks associated with PUVA.  Restarted on UVA1 with clinical remission.  Discontinue UVA1 3/12/2018  -Temporarily discontinued UVA1 (4/26/17) due to insurance  -Targretin 1% gel (added on 5/23/16) with good response.   -Triamcinolone 0.1% cream as needed for irritation from targretin gel.    CC:   Chief Complaint   Patient presents with     Derm Problem     Lyndsey is here for a T cell skin check, states no concerns today.          Encounter Date: Jun 12, 2018    History of Present Illness:  Ms. Lyndsey Hagen is a 67 year old female who returns for follow up of CTCL.  She feels her skin is doing well despite stopping UVA1 phototherapy.  She occasionally gets itchy spots and applies the Targretin gel to the areas and they resolve.  She did break her left wrist since our left visit and notes a recent red spot on her forearm after they removed the splint.  She has been monitoring her lymph nodes since our last visit and has not had any new lumps.    Past Medical History:   Patient Active Problem List   Diagnosis     Cutaneous T-cell lymphoma (H)     Past Medical History:   Diagnosis Date     Cutaneous T-cell lymphoma (H)      Past Surgical History:   Procedure Laterality Date     NO HISTORY OF SURGERY  10/28/31    derm           Medications:  Current Outpatient Prescriptions   Medication Sig Dispense Refill     Cholecalciferol (VITAMIN D-3 PO) Take 1 tablet by mouth daily.       Coenzyme Q10 (CO Q-10 PO) Take 1  tablet by mouth daily.       KRILL OIL PO Take  by mouth daily.       Multiple Vitamin (MULTIVITAMINS PO) Take  by mouth daily.       Probiotic Product (SOLUBLE FIBER/PROBIOTICS PO) Take 2 tablets by mouth daily.       TARGRETIN 1 % GEL APPLY EVERY OTHER NIGHT TO EVERY NIGHT AS DIRECTED 60 g 5     triamcinolone (KENALOG) 0.1 % cream Use in morning to rash (Patient not taking: Reported on 6/12/2018) 454 g 5     Allergies   Allergen Reactions     Nkda [No Known Drug Allergies]          Review of Systems:  -As per HPI.    Physical exam:  Vitals: /83 (BP Location: Right arm, Patient Position: Chair, Cuff Size: Adult Regular)  Pulse 63  GEN: This is a well developed, well-nourished female in no acute distress, in a pleasant mood.    SKIN: Full skin, which includes the head/face, both arms, chest, back, abdomen,both legs, genitalia and/or groin buttocks, digits and/or nails, was examined.  -few red macules of upper back and arms.  Left wrist has a stuck on bright red papule  -There is no lymphadenopathy on palpation of cervical, post auricular, occipital, axillary, inguinal, and popliteal nodes.  -deeply tanned with white macules  -No other lesions of concern on areas examined.     Impression/Plan:  1. CTCL stage IIB- continues in clinical remission    Occasional red areas do not appear to be active lymphoma and spot on wrist has appearance of a BLK, but I have asked the patient to monitor this area since it is new    Hold phototherapy and continue Targretin gel and triamcinolone as needed    CC Dr. Riggs on close of this encounter.  Follow-up in 3 months, earlier for new or changing lesions.       Staff Involved:  Staff Only      Again, thank you for allowing me to participate in the care of your patient.      Sincerely,    Arlen Guillory MD

## 2018-07-09 ENCOUNTER — RECORDS - HEALTHEAST (OUTPATIENT)
Dept: ADMINISTRATIVE | Facility: OTHER | Age: 68
End: 2018-07-09

## 2018-08-27 ENCOUNTER — OFFICE VISIT (OUTPATIENT)
Dept: DERMATOLOGY | Facility: CLINIC | Age: 68
End: 2018-08-27
Payer: COMMERCIAL

## 2018-08-27 ENCOUNTER — RECORDS - HEALTHEAST (OUTPATIENT)
Dept: ADMINISTRATIVE | Facility: OTHER | Age: 68
End: 2018-08-27

## 2018-08-27 DIAGNOSIS — D48.9 NEOPLASM OF UNCERTAIN BEHAVIOR: Primary | ICD-10-CM

## 2018-08-27 DIAGNOSIS — C84.A0 CUTANEOUS T-CELL LYMPHOMA, UNSPECIFIED BODY REGION (H): ICD-10-CM

## 2018-08-27 ASSESSMENT — PAIN SCALES - GENERAL
PAINLEVEL: NO PAIN (0)
PAINLEVEL: NO PAIN (0)

## 2018-08-27 NOTE — LETTER
8/27/2018       RE: Lyndsey Hagen  [address]  [facility] [address]     Dear Colleague,    Thank you for referring your patient, Lyndsey Hagen, to the [facility] DERMATOLOGY at [facility]. Please see a copy of my visit note below.    [facility] Dermatology Note      Dermatology Problem List:  1. CTCL, stage IIB  - Previously treated with PUVA with good response. Treated at [place] prior to establishing care here. Stopped due to difficulty percuring oxsoralen and risks associated with PUVA.  Restarted on UVA1 with clinical remission.  Discontinue UVA1 3/12/2018  -Temporarily discontinued UVA1 (4/26/17) due to insurance  -Targretin 1% gel (added on 5/23/16) with good response.   -Triamcinolone 0.1% cream as needed for irritation from targretin gel.  2.   NUB, Right lower abdomen, bx 8/27/18  3. Lesion to monitor, left superior parietal scalp  CC:   Chief Complaint   Patient presents with     Derm Problem     Lyndsey is here for a T cell skin check, states no concerns today.        Encounter Date: Aug 27, 2018    History of Present Illness:  Ms. Lyndsey Hagen is a 67 year old female who returns for follow up of CTCL.  She is very happy with her skin today. She will occasionally use targretin, perhaps once/month to active areas but has not been using TAC. She is not concerned about any new areas or other skin lesions.  She denies any new or changing lesions. Generally feeling well in general state of health.    Past Medical History:   Patient Active Problem List   Diagnosis     Cutaneous T-cell lymphoma (H)     Past Medical History:   Diagnosis Date     Cutaneous T-cell lymphoma (H)      Past Surgical History:   Procedure Laterality Date     NO HISTORY OF SURGERY  10/28/31    derm           Medications:  Current Outpatient Prescriptions   Medication Sig Dispense Refill     Cholecalciferol (VITAMIN D-3 PO) Take 1 tablet by mouth daily.       Coenzyme Q10 (CO Q-10 PO) Take  1 tablet by mouth daily.       KRILL OIL PO Take  by mouth daily.       Multiple Vitamin (MULTIVITAMINS PO) Take  by mouth daily.       Probiotic Product (SOLUBLE FIBER/PROBIOTICS PO) Take 2 tablets by mouth daily.       TARGRETIN 1 % GEL APPLY EVERY OTHER NIGHT TO EVERY NIGHT AS DIRECTED 60 g 5     triamcinolone (KENALOG) 0.1 % cream Use in morning to rash 454 g 5     Allergies   Allergen Reactions     Nkda [No Known Drug Allergies]      Review of Systems:  -As per HPI.    Physical exam:  Vitals: There were no vitals taken for this visit.  GEN: This is a well developed, well-nourished female in no acute distress, in a pleasant mood.    SKIN: Full skin, which includes the head/face, both arms, chest, back, abdomen,both legs, genitalia and/or groin buttocks, digits and/or nails, was examined.  -few red macules of upper back and arms.   -There is no lymphadenopathy on palpation of cervical, post auricular, occipital, axillary, inguinal, and popliteal nodes.  - deeply tanned with white macules  - 4mm macule with areas of light and medium brown and grey-brown; regular reticular network with irregular borders  -stuck on papule left superior parietal scalp with deep brown pigment  - No other lesions of concern on areas examined.     Impression/Plan:  1. 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   NUB, Right lower abdomen  We offered monitoring vs biopsy but ultimately decided to biopsy given her PUVA history  Procedure Note: Biopsy by shave technique  The risks and benefits of the procedure were described to the patient. These include but are not limited to bleeding, infection, scar, incomplete removal, and non-diagnostic biopsy. Written informed consent was obtained. The right lower abdomen was cleansed with an alcohol pad and injected with 1% lidocaine with  epinephrine buffered with sodium bicarbonate. Once anesthesia was obtained, a biopsy was performed with Gilette blade. The tissue was placed in a labeled container with formalin and sent to pathology. Hemostasis was achieved with aluminum chloride. Vaseline and a bandage were applied to the wound. The patient tolerated the procedure well and was given post biopsy care instructions.    3. Lesion to monitor, left superior parietal scalp- Photo today.  Patient dyed hair yesterday and this may be just a SK with hair dye    CC Dr. Riggs on close of this encounter.  Follow-up in 3 months, earlier for new or changing lesions.     Staffed with Dr. Guillory    Staff Involved:  Resident (Chayito Perez MD) / Staff (as above)  .I, Arlen Guillory MD, saw this patient with the resident and agree with the resident s findings and plan of care as documented in the resident s note.  I was present for key portions of the procedure.     Again, thank you for allowing me to participate in the care of your patient.      Sincerely,    Arlen Guillory MD

## 2018-08-27 NOTE — NURSING NOTE
Lidocaine-1% injection   1mL once for one use, starting 8/27/2018 ending 8/27/2018,  2mL disp, R-0, injection  Injected by Dr. Perez

## 2018-08-27 NOTE — MR AVS SNAPSHOT
After Visit Summary   8/27/2018    Lyndsey Hagen    MRN: [id number]           Patient Information     Date Of Birth          1950        Visit Information        Provider Department      8/27/2018 10:00 AM Arlen Guillory MD [facility] Dermatology        Today's Diagnoses     Neoplasm of uncertain behavior    -  1      Care Instructions    Wound Care After a Biopsy    What is a skin biopsy?  A skin biopsy allows the doctor to examine a very small piece of tissue under the microscope to determine the diagnosis and the best treatment for the skin condition. A local anesthetic (numbing medicine)  is injected with a very small needle into the skin area to be tested. A small piece of skin is taken from the area. Sometimes a suture (stitch) is used.     What are the risks of a skin biopsy?  I will experience scar, bleeding, swelling, pain, crusting and redness. I may experience incomplete removal or recurrence. Risks of this procedure are excessive bleeding, bruising, infection, nerve damage, numbness, thick (hypertrophic or keloidal) scar and non-diagnostic biopsy.    How should I care for my wound for the first 24 hours?    Keep the wound dry and covered for 24 hours    If it bleeds, hold direct pressure on the area for 15 minutes. If bleeding does not stop then go to the emergency room    Avoid strenuous exercise the first 1-2 days or as your doctor instructs you    How should I care for the wound after 24 hours?    After 24 hours, remove the bandage    You may bathe or shower as normal    If you had a scalp biopsy, you can shampoo as usual and can use shower water to clean the biopsy site daily    Clean the wound twice a day with gentle soap and water    Do not scrub, be gentle    Apply white petroleum/Vaseline after cleaning the wound with a cotton swab or a clean finger, and keep the site covered with a Bandaid /bandage. Bandages are not necessary with a scalp biopsy    If you are unable to  cover the site with a Bandaid /bandage, re-apply ointment 2-3 times a day to keep the site moist. Moisture will help with healing    Avoid strenuous activity for first 1-2 days    Avoid lakes, rivers, pools, and oceans until the stitches are removed or the site is healed    How do I clean my wound?    Wash hands thoroughly with soap or use hand  before all wound care    Clean the wound with gentle soap and water    Apply white petroleum/Vaseline  to wound after it is clean    Replace the Bandaid /bandage to keep the wound covered for the first few days or as instructed by your doctor    If you had a scalp biopsy, warm shower water to the area on a daily basis should suffice    What should I use to clean my wound?     Cotton-tipped applicators (Qtips )    White petroleum jelly (Vaseline ). Use a clean new container and use Q-tips to apply.    Bandaids   as needed    Gentle soap     How should I care for my wound long term?    Do not get your wound dirty    Keep up with wound care for one week or until the area is healed.    A small scab will form and fall off by itself when the area is completely healed. The area will be red and will become pink in color as it heals. Sun protection is very important for how your scar will turn out. Sunscreen with an SPF 30 or greater is recommended once the area is healed.    If you have stitches, stitches need to be removed in 14 days. You may return to our clinic for this or you may have it done locally at your doctor s office.    You should have some soreness but it should be mild and slowly go away over several days. Talk to your doctor about using tylenol for pain,    When should I call my doctor?  If you have increased:     Pain or swelling    Pus or drainage (clear or slightly yellow drainage is ok)    Temperature over 100F    Spreading redness or warmth around wound    When will I hear about my results?  The biopsy results can take 2-3 weeks to come back. The clinic  will call you with the results, send you a [employer] message, or have you schedule a follow-up clinic or phone time to discuss the results. Contact our clinics if you do not hear from us in 3 weeks.     Who should I call with questions?    [facility]: [phone]     [facility]: [phone]    For urgent needs outside of business hours call the [facility] at [phone] and ask for the dermatology resident on call              Follow-ups after your visit        Follow-up notes from your care team     Return in about 3 months (around 11/27/2018).      Your next 10 appointments already scheduled     Nov 26, 2018 10:00 AM CST   (Arrive by 9:45 AM)   Return T-Cell Visit with Arlen Guillory MD   [facility] Dermatology ([facility])    [address] [address] [phone]              Who to contact     Please call your clinic at [phone] to:    Ask questions about your health    Make or cancel appointments    Discuss your medicines    Learn about your test results    Speak to your doctor            Additional Information About Your Visit        [employer] Information     [employer] gives you secure access to your electronic health record. If you see a primary care provider, you can also send messages to your care team and make appointments. If you have questions, please call your primary care clinic.  If you do not have a primary care provider, please call [phone] and they will assist you.      [employer] is an electronic gateway that provides easy, online access to your medical records. With [employer], you can request a clinic appointment, read your test results, renew a prescription or communicate with your care team.     To access your existing account, please contact your [facility] or call [phone] for assistance.        Care EveryWhere ID      This is your Care EveryWhere ID. This could be used by other organizations to access your [place] medical records  [id number]         Blood Pressure from Last 3 Encounters:   06/12/18 127/83   03/12/18 (!) 136/94   12/27/17 138/80    Weight from Last 3 Encounters:   05/23/16 73.5 kg (162 lb)   04/25/16 72.6 kg (160 lb)   02/22/16 76.1 kg (167 lb 12.8 oz)              We Performed the Following     BIOPSY SKIN/SUBQ/MUC MEM, SINGLE LESION     Dermatological path order and indications        Primary Care Provider Office Phone # Fax #    April Rosalav [phone]       [facility] [address] [facility] [address]        Equal Access to Services     KARTHIK PAULA : Hadii camden riverao Soduarte, waaxda luqadaha, qaybta kaalmada adeegyada, manuel miranda . So [facility] [phone].    ATENCIÓN: Si habla español, tiene a bustillos disposición servicios gratuitos de asistencia lingüística. Llame al [phone].    We comply with applicable federal civil rights laws and [place] laws. We do not discriminate on the basis of race, color, national origin, age, disability, sex, sexual orientation, or gender identity.            Thank you!     Thank you for choosing [facility] DERMATOLOGY  for your care. Our goal is always to provide you with excellent care. Hearing back from our patients is one way we can continue to improve our services. Please take a few minutes to complete the written survey that you may receive in the mail after your visit with us. Thank you!             Your Updated Medication List - Protect others around you: Learn how to safely use, store and throw away your medicines at www.disposemymeds.org.          This list is accurate as of 8/27/18 10:36 AM.  Always use your most recent med list.                   Brand Name Dispense Instructions for use Diagnosis    CO Q-10 PO      Take 1 tablet by mouth daily.        KRILL OIL PO      Take  by mouth daily.         MULTIVITAMINS PO      Take  by mouth daily.        SOLUBLE FIBER/PROBIOTICS PO      Take 2 tablets by mouth daily.        TARGRETIN 1 % Gel   Generic drug:  Bexarotene     60 g    APPLY EVERY OTHER NIGHT TO EVERY NIGHT AS DIRECTED    CTCL (cutaneous T-cell lymphoma) (H)       triamcinolone 0.1 % cream    KENALOG    454 g    Use in morning to rash    CTCL (cutaneous T-cell lymphoma) (H)       VITAMIN D-3 PO      Take 1 tablet by mouth daily.

## 2018-08-27 NOTE — PATIENT INSTRUCTIONS

## 2018-08-27 NOTE — PROGRESS NOTES
[facility] Dermatology Note      Dermatology Problem List:  1. CTCL, stage IIB  - Previously treated with PUVA with good response. Treated at [place] prior to establishing care here. Stopped due to difficulty percuring oxsoralen and risks associated with PUVA.  Restarted on UVA1 with clinical remission.  Discontinue UVA1 3/12/2018  -Temporarily discontinued UVA1 (4/26/17) due to insurance  -Targretin 1% gel (added on 5/23/16) with good response.   -Triamcinolone 0.1% cream as needed for irritation from targretin gel.  2.   NUB, Right lower abdomen, bx 8/27/18  3. Lesion to monitor, left superior parietal scalp  CC:   Chief Complaint   Patient presents with     Derm Problem     Lyndsey is here for a T cell skin check, states no concerns today.        Encounter Date: Aug 27, 2018    History of Present Illness:  Ms. Lyndsey Hagen is a 67 year old female who returns for follow up of CTCL.  She is very happy with her skin today. She will occasionally use targretin, perhaps once/month to active areas but has not been using TAC. She is not concerned about any new areas or other skin lesions.  She denies any new or changing lesions. Generally feeling well in general state of health.    Past Medical History:   Patient Active Problem List   Diagnosis     Cutaneous T-cell lymphoma (H)     Past Medical History:   Diagnosis Date     Cutaneous T-cell lymphoma (H)      Past Surgical History:   Procedure Laterality Date     NO HISTORY OF SURGERY  10/28/31    derm           Medications:  Current Outpatient Prescriptions   Medication Sig Dispense Refill     Cholecalciferol (VITAMIN D-3 PO) Take 1 tablet by mouth daily.       Coenzyme Q10 (CO Q-10 PO) Take 1 tablet by mouth daily.       KRILL OIL PO Take  by mouth daily.       Multiple Vitamin (MULTIVITAMINS PO) Take  by mouth daily.       Probiotic Product (SOLUBLE FIBER/PROBIOTICS PO) Take 2 tablets by mouth daily.       TARGRETIN 1 % GEL APPLY EVERY OTHER NIGHT TO EVERY  NIGHT AS DIRECTED 60 g 5     triamcinolone (KENALOG) 0.1 % cream Use in morning to rash 454 g 5     Allergies   Allergen Reactions     Nkda [No Known Drug Allergies]      Review of Systems:  -As per HPI.    Physical exam:  Vitals: There were no vitals taken for this visit.  GEN: This is a well developed, well-nourished female in no acute distress, in a pleasant mood.    SKIN: Full skin, which includes the head/face, both arms, chest, back, abdomen,both legs, genitalia and/or groin buttocks, digits and/or nails, was examined.  -few red macules of upper back and arms.   -There is no lymphadenopathy on palpation of cervical, post auricular, occipital, axillary, inguinal, and popliteal nodes.  - deeply tanned with white macules  - 4mm macule with areas of light and medium brown and grey-brown; regular reticular network with irregular borders  -stuck on papule left superior parietal scalp with deep brown pigment  - No other lesions of concern on areas examined.     Impression/Plan:  1. 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   NUB, Right lower abdomen  We offered monitoring vs biopsy but ultimately decided to biopsy given her PUVA history  Procedure Note: Biopsy by shave technique  The risks and benefits of the procedure were described to the patient. These include but are not limited to bleeding, infection, scar, incomplete removal, and non-diagnostic biopsy. Written informed consent was obtained. The right lower abdomen was cleansed with an alcohol pad and injected with 1% lidocaine with epinephrine buffered with sodium bicarbonate. Once anesthesia was obtained, a biopsy was performed with Gilette blade. The tissue was placed in a labeled container with formalin and sent to pathology. Hemostasis was achieved with aluminum chloride. Vaseline and a bandage  were applied to the wound. The patient tolerated the procedure well and was given post biopsy care instructions.    3. Lesion to monitor, left superior parietal scalp- Photo today.  Patient dyed hair yesterday and this may be just a SK with hair dye    CC Dr. Riggs on close of this encounter.  Follow-up in 3 months, earlier for new or changing lesions.     Staffed with Dr. Guillory    Staff Involved:  Resident (Chayito Perez MD) / Staff (as above)  .I, Arlen Guillory MD, saw this patient with the resident and agree with the resident s findings and plan of care as documented in the resident s note.  I was present for key portions of the procedure. KB

## 2018-08-30 LAB — COPATH REPORT: NORMAL

## 2018-10-18 ENCOUNTER — COMMUNICATION - HEALTHEAST (OUTPATIENT)
Dept: INTERNAL MEDICINE | Facility: CLINIC | Age: 68
End: 2018-10-18

## 2018-11-26 ENCOUNTER — OFFICE VISIT (OUTPATIENT)
Dept: DERMATOLOGY | Facility: CLINIC | Age: 68
End: 2018-11-26
Payer: COMMERCIAL

## 2018-11-26 VITALS — SYSTOLIC BLOOD PRESSURE: 118 MMHG | DIASTOLIC BLOOD PRESSURE: 78 MMHG | HEART RATE: 103 BPM

## 2018-11-26 DIAGNOSIS — C84.A0 CUTANEOUS T-CELL LYMPHOMA, UNSPECIFIED BODY REGION (H): Primary | ICD-10-CM

## 2018-11-26 RX ORDER — BEXAROTENE 1 G/100G
GEL TOPICAL
Qty: 60 G | Refills: 5 | Status: SHIPPED | OUTPATIENT
Start: 2018-11-26

## 2018-11-26 ASSESSMENT — PAIN SCALES - GENERAL: PAINLEVEL: NO PAIN (0)

## 2018-11-26 NOTE — PROGRESS NOTES
[facility] Dermatology Note      Dermatology Problem List:  1. CTCL, stage IIB  - Previously treated with PUVA with good response. Treated at [place] prior to establishing care here. Stopped due to difficulty percuring oxsoralen and risks associated with PUVA.  Restarted on UVA1 with clinical remission.  Discontinue UVA1 3/12/2018  -Temporarily discontinued UVA1 (4/26/17) due to insurance  -Targretin 1% gel (added on 5/23/16) with good response.   -Triamcinolone 0.1% cream as needed for irritation from targretin gel.    2. Lesion to monitor, left superior parietal scalp Photo 8/27/2018    Encounter Date: Nov 26, 2018    CC:   Chief Complaint   Patient presents with     Derm Problem     Lyndsey is here for a CTCL skin check, states she started to develop patches about 6 weeks ago on her left arm.           History of Present Illness:  Ms. Lyndsey Hagen is a 68 year old female who presents as a follow-up for CTCL, stage IIB. The patient was last seen 8/27/2018 when UVA-1 continued to be held (stopped March 2017). She reports that her skin continued to do very well up until the beginning of October when she developed one spot on the left lateral upper arm. She started using bexarotene gel to this area and seems to have responded well. At the beginning of November she also noted two additional spots on her left posterior upper arm/shoulder and one on the dorsal forearm. She has been using bexarotene to these spots as well and says that these are flattening out as well.   She says that she has no other areas of concern. She denies fevers, chills, night sweats, tender/swollen lymph nodes.       Past Medical History:   Patient Active Problem List   Diagnosis     Cutaneous T-cell lymphoma (H)     Past Medical History:   Diagnosis Date     Cutaneous T-cell lymphoma (H)      Past Surgical History:   Procedure Laterality Date     NO HISTORY OF SURGERY  10/28/31    derm       Social History:  Patient reports that she  has quit smoking. Her smoking use included Cigarettes. She has a 12.00 pack-year smoking history. She has never used smokeless tobacco.    Family History:  Family History   Problem Relation Age of Onset     Cancer Mother      skin cancer     Skin Cancer Mother      Melanoma No family hx of        Medications:  Current Outpatient Prescriptions   Medication Sig Dispense Refill     Cholecalciferol (VITAMIN D-3 PO) Take 1 tablet by mouth daily.       Coenzyme Q10 (CO Q-10 PO) Take 1 tablet by mouth daily.       KRILL OIL PO Take  by mouth daily.       Multiple Vitamin (MULTIVITAMINS PO) Take  by mouth daily.       Probiotic Product (SOLUBLE FIBER/PROBIOTICS PO) Take 2 tablets by mouth daily.       TARGRETIN 1 % GEL APPLY EVERY OTHER NIGHT TO EVERY NIGHT AS DIRECTED 60 g 5     triamcinolone (KENALOG) 0.1 % cream Use in morning to rash (Patient not taking: Reported on 11/26/2018) 454 g 5        Allergies   Allergen Reactions     Nkda [No Known Drug Allergies]          Review of Systems:  -As per HPI  -Constitutional: Otherwise feeling well today, in usual state of health.  -HEENT: Patient denies nonhealing oral sores.  -Skin: As above in HPI. No additional skin concerns.    Physical exam:  Vitals: /78 (BP Location: Right arm, Patient Position: Chair, Cuff Size: Adult Regular)  Pulse 103  GEN: This is a well developed, well-nourished female in no acute distress, in a pleasant mood.    SKIN: Full skin, which includes the head/face, both arms, chest, back, abdomen,both legs, genitalia and/or groin buttocks, digits and/or nails, was examined.  -there are three pink plaques with small amount of scale on the left arm: one on the left dorsal forearm, one on the left posterior upper arm/shoulder, one on the left lateral upper arm  -there are two reddish indurated plaques with scale on the left buttock  -nevus on the left vertex scalp remains unchanged from previous exam and compared with previous photos  No cervical,  submandibular, axillary or inguinal nodes by palpation  -No other lesions of concern on areas examined.     Impression/Plan:  1. CTCL stage IIB- continues in clinical remission    Continue holding phototherapy for now.     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  Lesion to monitor of left vertex scalp: no changes today based on previous exam and photos. Patient reassured, no treatment indicated.     Follow-up in 3 months, earlier for new or changing lesions.       Dr. Guillory staffed the patient.    Staff Involved:  Resident/Staff    Esdras Ceron MD, PhD  Medicine-Dermatology PGY-3  .I, Arlen Guillory MD, saw this patient with the resident and agree with the resident s findings and plan of care as documented in the resident s note.

## 2018-11-26 NOTE — PATIENT INSTRUCTIONS
- continue to use the Targretin (bexarotene) 1% gel to active areas of your cutaneous T cell lymphoma  - currently three spots on left arm and two spots on left buttock

## 2018-11-26 NOTE — LETTER
11/26/2018       RE: Lyndsey Hagen  [address]  [place] [address]     Dear Colleague,    Thank you for referring your patient, Lyndsey Hagen, to the [facility] DERMATOLOGY at [facility]. Please see a copy of my visit note below.    [facility] Dermatology Note      Dermatology Problem List:  1. CTCL, stage IIB  - Previously treated with PUVA with good response. Treated at [place] prior to establishing care here. Stopped due to difficulty percuring oxsoralen and risks associated with PUVA.  Restarted on UVA1 with clinical remission.  Discontinue UVA1 3/12/2018  -Temporarily discontinued UVA1 (4/26/17) due to insurance  -Targretin 1% gel (added on 5/23/16) with good response.   -Triamcinolone 0.1% cream as needed for irritation from targretin gel.    2. Lesion to monitor, left superior parietal scalp Photo 8/27/2018    Encounter Date: Nov 26, 2018    CC:   Chief Complaint   Patient presents with     Derm Problem     Lyndsey is here for a CTCL skin check, states she started to develop patches about 6 weeks ago on her left arm.           History of Present Illness:  Ms. Lyndsey Hagen is a 68 year old female who presents as a follow-up for CTCL, stage IIB. The patient was last seen 8/27/2018 when UVA-1 continued to be held (stopped March 2017). She reports that her skin continued to do very well up until the beginning of October when she developed one spot on the left lateral upper arm. She started using bexarotene gel to this area and seems to have responded well. At the beginning of November she also noted two additional spots on her left posterior upper arm/shoulder and one on the dorsal forearm. She has been using bexarotene to these spots as well and says that these are flattening out as well.   She says that she has no other areas of concern. She denies fevers, chills, night sweats, tender/swollen lymph nodes.       Past Medical History:   Patient Active Problem  List   Diagnosis     Cutaneous T-cell lymphoma (H)     Past Medical History:   Diagnosis Date     Cutaneous T-cell lymphoma (H)      Past Surgical History:   Procedure Laterality Date     NO HISTORY OF SURGERY  10/28/31    derm       Social History:  Patient reports that she has quit smoking. Her smoking use included Cigarettes. She has a 12.00 pack-year smoking history. She has never used smokeless tobacco.    Family History:  Family History   Problem Relation Age of Onset     Cancer Mother      skin cancer     Skin Cancer Mother      Melanoma No family hx of        Medications:  Current Outpatient Prescriptions   Medication Sig Dispense Refill     Cholecalciferol (VITAMIN D-3 PO) Take 1 tablet by mouth daily.       Coenzyme Q10 (CO Q-10 PO) Take 1 tablet by mouth daily.       KRILL OIL PO Take  by mouth daily.       Multiple Vitamin (MULTIVITAMINS PO) Take  by mouth daily.       Probiotic Product (SOLUBLE FIBER/PROBIOTICS PO) Take 2 tablets by mouth daily.       TARGRETIN 1 % GEL APPLY EVERY OTHER NIGHT TO EVERY NIGHT AS DIRECTED 60 g 5     triamcinolone (KENALOG) 0.1 % cream Use in morning to rash (Patient not taking: Reported on 11/26/2018) 454 g 5        Allergies   Allergen Reactions     Nkda [No Known Drug Allergies]          Review of Systems:  -As per HPI  -Constitutional: Otherwise feeling well today, in usual state of health.  -HEENT: Patient denies nonhealing oral sores.  -Skin: As above in HPI. No additional skin concerns.    Physical exam:  Vitals: /78 (BP Location: Right arm, Patient Position: Chair, Cuff Size: Adult Regular)  Pulse 103  GEN: This is a well developed, well-nourished female in no acute distress, in a pleasant mood.    SKIN: Full skin, which includes the head/face, both arms, chest, back, abdomen,both legs, genitalia and/or groin buttocks, digits and/or nails, was examined.  -there are three pink plaques with small amount of scale on the left arm: one on the left dorsal forearm,  one on the left posterior upper arm/shoulder, one on the left lateral upper arm  -there are two reddish indurated plaques with scale on the left buttock  -nevus on the left vertex scalp remains unchanged from previous exam and compared with previous photos  No cervical, submandibular, axillary or inguinal nodes by palpation  -No other lesions of concern on areas examined.     Impression/Plan:  1. CTCL stage IIB- continues in clinical remission    Continue holding phototherapy for now.     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  Lesion to monitor of left vertex scalp: no changes today based on previous exam and photos. Patient reassured, no treatment indicated.     Follow-up in 3 months, earlier for new or changing lesions.       Dr. Guillory staffed the patient.    Staff Involved:  Resident/Staff    Esdras Ceron MD, PhD  Medicine-Dermatology PGY-3    .I, Arlen Guillory MD, saw this patient with the resident and agree with the resident s findings and plan of care as documented in the resident s note.

## 2018-11-26 NOTE — NURSING NOTE
Dermatology Rooming Note    Lyndsey Hagen's goals for this visit include:   Chief Complaint   Patient presents with     Derm Problem     Lyndsey is here for a CTCL skin check, states she started to develop patches about 6 weeks ago on her left arm.         Rhonda Cerrato LPN

## 2018-11-26 NOTE — MR AVS SNAPSHOT
After Visit Summary   11/26/2018    Lyndsey Hagen    MRN: [id number]           Patient Information     Date Of Birth          1950        Visit Information        Provider Department      11/26/2018 10:00 AM Arlen Guillory MD [facility] Dermatology        Today's Diagnoses     Cutaneous T-cell lymphoma, unspecified body region (H)    -  1      Care Instructions    - continue to use the Targretin (bexarotene) 1% gel to active areas of your cutaneous T cell lymphoma  - currently three spots on left arm and two spots on left buttock          Follow-ups after your visit        Follow-up notes from your care team     Return in about 3 months (around 2/26/2019).      Your next 10 appointments already scheduled     Mar 04, 2019 11:30 AM CST   (Arrive by 11:15 AM)   Return T-Cell Visit with Arlen Guillory MD   [facility] Dermatology ([facility] [[email or web address] and [facility])    [address] [place] [id number] [phone]              Who to contact     Please call your clinic at [phone] to:    Ask questions about your health    Make or cancel appointments    Discuss your medicines    Learn about your test results    Speak to your doctor            Additional Information About Your Visit        [employer] Information     [employer] gives you secure access to your electronic health record. If you see a primary care provider, you can also send messages to your care team and make appointments. If you have questions, please call your primary care clinic.  If you do not have a primary care provider, please call [phone] and they will assist you.      [employer] is an electronic gateway that provides easy, online access to your medical records. With [employer], you can request a clinic appointment, read your test results, renew a prescription or communicate with your care team.     To access your existing account, please contact your [facility] or  call [phone] for assistance.        Care EveryWhere ID     This is your Care EveryWhere ID. This could be used by other organizations to access your [place] medical records  [id number]        Your Vitals Were     Pulse                   103            Blood Pressure from Last 3 Encounters:   11/26/18 118/78   06/12/18 127/83   03/12/18 (!) 136/94    Weight from Last 3 Encounters:   05/23/16 73.5 kg (162 lb)   04/25/16 72.6 kg (160 lb)   02/22/16 76.1 kg (167 lb 12.8 oz)              Today, you had the following     No orders found for display         Where to get your medicines      These medications were sent to [facility] - [place], [place] - [address]  [address], [facility] [address]    Hours:  Mon-Fri 8:30am-5:00pm Toll Free [phone] Phone:  [phone]     Bexarotene 1 % Gel          Primary Care Provider Office Phone # Fax #    April Rosalva [phone]       [facility] [address]  [place] [facility] [address]        Equal Access to Services     KARTHIK PAULA AH: Hadii camden riverao Soduarte, waaxda luqadaha, qaybta kaalmada adebrandonyada, manuel valentin. So [facility] [phone].    ATENCIÓN: Si habla español, tiene a bustillos disposición servicios gratuitos de asistencia lingüística. [facility] [phone].    We comply with applicable federal civil rights laws and [place] laws. We do not discriminate on the basis of race, color, national origin, age, disability, sex, sexual orientation, or gender identity.            Thank you!     Thank you for choosing [facility] DERMATOLOGY  for your care. Our goal is always to provide you with excellent care. Hearing back from our patients is one way we can continue to improve our services. Please take a few minutes to complete the written survey that you may receive in the mail after your visit with us. Thank you!             Your Updated Medication List - Protect  others around you: Learn how to safely use, store and throw away your medicines at www.disposemymeds.org.          This list is accurate as of 11/26/18 11:59 PM.  Always use your most recent med list.                   Brand Name Dispense Instructions for use Diagnosis    Bexarotene 1 % Gel    TARGRETIN    60 g    APPLY EVERY OTHER NIGHT TO EVERY NIGHT AS DIRECTED    Cutaneous T-cell lymphoma, unspecified body region (H)       CO Q-10 PO      Take 1 tablet by mouth daily.        KRILL OIL PO      Take  by mouth daily.        MULTIVITAMINS PO      Take  by mouth daily.        SOLUBLE FIBER/PROBIOTICS PO      Take 2 tablets by mouth daily.        triamcinolone 0.1 % cream    KENALOG    454 g    Use in morning to rash    CTCL (cutaneous T-cell lymphoma) (H)       VITAMIN D-3 PO      Take 1 tablet by mouth daily.

## 2018-12-11 ENCOUNTER — TELEPHONE (OUTPATIENT)
Dept: DERMATOLOGY | Facility: CLINIC | Age: 68
End: 2018-12-11

## 2018-12-11 NOTE — TELEPHONE ENCOUNTER
Pt enrolled in Medicare plan newly this and copay for 1 tube of Targretin was $3500+ for her. Pt unable to afford - calling clinic regarding cost. Pt amenable to free drug process. Starting application and mailing her the rest for completion/ submission.    [email or web address]

## 2018-12-19 NOTE — TELEPHONE ENCOUNTER
Spoke to patient and she just mailed in application to [employer] yesterday 12-18 will follow up on 12-21 to see if they received yet.

## 2018-12-31 NOTE — TELEPHONE ENCOUNTER
Calling [employer] at [phone] opt 4 to check receipt of pt's free drug application. Spoke to rep Nan. Verified pt's zip code, , and doctor's NPI to rep. Rep states jasvir processing for Medicare pts will not begin until  for 2019. Everything was rec'd per rep.

## 2019-01-18 NOTE — TELEPHONE ENCOUNTER
Calling [employer] at [phone] opt 4 to inquire. Spoke to rep Sarmiento. Jimmy is complete and still processing. Gel is in stock currently at the pharmacy.

## 2019-02-07 NOTE — TELEPHONE ENCOUNTER
Rudy have not gotten pt's approval so calling [employer] at [phone] opt 4 to inquire on pt's jimmy status. Spoke to rep Oropeza. Jimmy is still processing - same info writer was told on 1/18 (previous call). She states she will triage to a supervisor to expedite and decision should be srivastava in 3 days or less. Asked that they reach out to me directly also with decision status.

## 2019-02-14 NOTE — TELEPHONE ENCOUNTER
Free Drug Application Approved  Effective Dates Through 12/31/19  Patient notified? Yes  Additional Information

## 2019-03-18 ENCOUNTER — OFFICE VISIT (OUTPATIENT)
Dept: DERMATOLOGY | Facility: CLINIC | Age: 69
End: 2019-03-18
Payer: COMMERCIAL

## 2019-03-18 ENCOUNTER — RECORDS - HEALTHEAST (OUTPATIENT)
Dept: ADMINISTRATIVE | Facility: OTHER | Age: 69
End: 2019-03-18

## 2019-03-18 DIAGNOSIS — D48.5 NEOPLASM OF UNCERTAIN BEHAVIOR OF SKIN: ICD-10-CM

## 2019-03-18 DIAGNOSIS — D22.9 MULTIPLE BENIGN NEVI: ICD-10-CM

## 2019-03-18 DIAGNOSIS — C84.A0 CUTANEOUS T-CELL LYMPHOMA, UNSPECIFIED BODY REGION (H): Primary | ICD-10-CM

## 2019-03-18 RX ORDER — LIDOCAINE HYDROCHLORIDE AND EPINEPHRINE 10; 10 MG/ML; UG/ML
3 INJECTION, SOLUTION INFILTRATION; PERINEURAL ONCE
Status: ACTIVE | OUTPATIENT
Start: 2019-03-18

## 2019-03-18 ASSESSMENT — PAIN SCALES - GENERAL
PAINLEVEL: NO PAIN (0)
PAINLEVEL: NO PAIN (0)

## 2019-03-18 NOTE — PATIENT INSTRUCTIONS

## 2019-03-18 NOTE — NURSING NOTE
Dermatology Rooming Note    Lyndsey Xiao's goals for this visit include:   Chief Complaint   Patient presents with     Derm Problem     Lyndsey is here for a ctcl skin check. States no concerns.        Rhonda Cerrato LPN

## 2019-03-18 NOTE — PROGRESS NOTES
[facility] Dermatology Note      Dermatology Problem List:    1. CTCL, stage IIB  - Previously treated with PUVA with good response. Treated at [place] prior to establishing care here. Stopped due to difficulty percuring oxsoralen and risks associated with PUVA.  Restarted on UVA1 with clinical remission.  Discontinue UVA1 3/12/2018  -Temporarily discontinued UVA1 (4/26/17) due to insurance  -Targretin 1% gel (added on 5/23/16) with good response.   -Triamcinolone 0.1% cream as needed for irritation from targretin gel.    2. Lesion to monitor, left superior parietal scalp Photo 8/27/2018    3. NUB  -Right shin,. bx'ed 3/18/19    Encounter Date: Mar 18, 2019    CC:   Chief Complaint   Patient presents with     Derm Problem     Lyndsey is here for a ctcl skin check. States no concerns.          History of Present Illness:  Ms. Lyndsey Hagen is a 68 year old female who presents as a follow-up for CTCL. The patient was last seen 11/26/18. Since that time, the pt states she has been doing well. Specifically, she notes that she had developed 7 or 8 small red spots on her skin since the last visit, but they have all resolved with topical Targretin. She is not currently using targretin or TAC. She denies any recurrent fevers, chills, N/V, weight loss, or new lumps or bumps. She notes for the past three moths, she developed a new spot on the right shin. It itches, but it does not hurt or bleed. It has not resolved since it first appeared. She has put numerous medications on this, including targretin, but it has not resolved. Otherwise, the pt denies any other general or skin-specific complaints at this time.       Patient Active Problem List   Diagnosis     Cutaneous T-cell lymphoma (H)     Past Medical History:   Diagnosis Date     Cutaneous T-cell lymphoma (H)      Past Surgical History:   Procedure Laterality Date     NO HISTORY OF SURGERY  10/28/31    derm       Social History:  Patient reports that she has  quit smoking. Her smoking use included cigarettes. She has a 12.00 pack-year smoking history. she has never used smokeless tobacco.    Family History:  Family History   Problem Relation Age of Onset     Cancer Mother         skin cancer     Skin Cancer Mother      Melanoma No family hx of        Medications:  Current Outpatient Medications   Medication Sig Dispense Refill     Bexarotene (TARGRETIN) 1 % GEL APPLY EVERY OTHER NIGHT TO EVERY NIGHT AS DIRECTED 60 g 5     Cholecalciferol (VITAMIN D-3 PO) Take 1 tablet by mouth daily.       Coenzyme Q10 (CO Q-10 PO) Take 1 tablet by mouth daily.       KRILL OIL PO Take  by mouth daily.       Multiple Vitamin (MULTIVITAMINS PO) Take  by mouth daily.       Probiotic Product (SOLUBLE FIBER/PROBIOTICS PO) Take 2 tablets by mouth daily.       triamcinolone (KENALOG) 0.1 % cream Use in morning to rash (Patient not taking: Reported on 3/18/2019) 454 g 5        Allergies   Allergen Reactions     Nkda [No Known Drug Allergies]          Review of Systems:  -Constitutional: Otherwise feeling well today, in usual state of health.  -HEENT: Patient denies nonhealing oral sores.  -Skin: As above in HPI. No additional skin concerns.    Physical exam:  Vitals: There were no vitals taken for this visit.  GEN: This is a well developed, well-nourished female in no acute distress, in a pleasant mood.    SKIN: Full skin, which includes the head/face, both arms, chest, back, abdomen,both legs, genitalia and/or groin buttocks, digits and/or nails, was examined.  -Numerous hypopigmented macules on the trunk and legs  -No tumors or red, scaling patches on areas examined today  -Benign appearing nevus on the left vertex scalp  -There is a solitary purple papule on the right distal shin that on dermoscopy is hyperkeratotic and has traces of dried blood. It is not TTP  No cervical, submandibular or axillary lymph nodes by palpation.  -No other lesions of concern on areas examined.      Impression/Plan:    1. CTCL    At this time, the pt's dz is stable. Although she has had 7 opr 8 small patches since the last time she was here, they have resolved with Targretin, and she does not have any systemic symptoms. Because of this, there is not an indication to do phototherapy or to obtain blood work    Told pt to call us if she develops 5 of spots at the same time, or if her CTCL spots stop responding to Targretin      2. Neoplasm of uncertain behavior on the right shin. The differential diagnosis includes SCC vs DF.     Shave biopsy:  After discussion of benefits and risks including but not limited to bleeding/bruising, pain/swelling, infection, scar, incomplete removal, nerve damage/numbness, recurrence, and non-diagnostic biopsy, written consent, verbal consent and photographs were obtained. Time-out was performed. The area was cleaned with isopropyl alcohol. 0.5ml of 1% lidocaine with 1:100,000 epinephrine was injected to obtain adequate anesthesia. A shave biopsy was performed. Hemostasis was achieved with aluminium chloride. Vaseline and a sterile dressing were applied. The patient tolerated the procedure and no complications were noted. The patient was provided with verbal and written post care instructions.    3. Multiple clinically benign nevi on the trunk and on the scalp    No further intervention required. Patient to report changes.       CC ESTABLISHED PATIENT  No address on file on close of this encounter.  Follow-up in 6 months, earlier for new or changing lesions.       Dr. Guillory staffed the patient.    Staff Involved:  Resident(Elio)/Staff  .I was present for key portions of the procedure. Arlen Guillory MD  .I, Arlen Guillory MD, saw this patient with the resident and agree with the resident s findings and plan of care as documented in the resident s note.

## 2019-03-18 NOTE — LETTER
3/18/2019       RE: Lyndsey Hagen  [address]  [place] [address]     Dear Colleague,    Thank you for referring your patient, Lyndsey Hagen, to the [facility] DERMATOLOGY at [facility]. Please see a copy of my visit note below.    [facility] Dermatology Note      Dermatology Problem List:    1. CTCL, stage IIB  - Previously treated with PUVA with good response. Treated at [place] prior to establishing care here. Stopped due to difficulty percuring oxsoralen and risks associated with PUVA.  Restarted on UVA1 with clinical remission.  Discontinue UVA1 3/12/2018  -Temporarily discontinued UVA1 (4/26/17) due to insurance  -Targretin 1% gel (added on 5/23/16) with good response.   -Triamcinolone 0.1% cream as needed for irritation from targretin gel.    2. Lesion to monitor, left superior parietal scalp Photo 8/27/2018    3. NUB  -Right shin,. bx'ed 3/18/19    Encounter Date: Mar 18, 2019    CC:   Chief Complaint   Patient presents with     Derm Problem     Lyndsey is here for a ctcl skin check. States no concerns.          History of Present Illness:  Ms. Lyndsey Hagen is a 68 year old female who presents as a follow-up for CTCL. The patient was last seen 11/26/18. Since that time, the pt states she has been doing well. Specifically, she notes that she had developed 7 or 8 small red spots on her skin since the last visit, but they have all resolved with topical Targretin. She is not currently using targretin or TAC. She denies any recurrent fevers, chills, N/V, weight loss, or new lumps or bumps. She notes for the past three moths, she developed a new spot on the right shin. It itches, but it does not hurt or bleed. It has not resolved since it first appeared. She has put numerous medications on this, including targretin, but it has not resolved. Otherwise, the pt denies any other general or skin-specific complaints at this time.       Patient Active Problem List    Diagnosis     Cutaneous T-cell lymphoma (H)     Past Medical History:   Diagnosis Date     Cutaneous T-cell lymphoma (H)      Past Surgical History:   Procedure Laterality Date     NO HISTORY OF SURGERY  10/28/31    derm       Social History:  Patient reports that she has quit smoking. Her smoking use included cigarettes. She has a 12.00 pack-year smoking history. she has never used smokeless tobacco.    Family History:  Family History   Problem Relation Age of Onset     Cancer Mother         skin cancer     Skin Cancer Mother      Melanoma No family hx of        Medications:  Current Outpatient Medications   Medication Sig Dispense Refill     Bexarotene (TARGRETIN) 1 % GEL APPLY EVERY OTHER NIGHT TO EVERY NIGHT AS DIRECTED 60 g 5     Cholecalciferol (VITAMIN D-3 PO) Take 1 tablet by mouth daily.       Coenzyme Q10 (CO Q-10 PO) Take 1 tablet by mouth daily.       KRILL OIL PO Take  by mouth daily.       Multiple Vitamin (MULTIVITAMINS PO) Take  by mouth daily.       Probiotic Product (SOLUBLE FIBER/PROBIOTICS PO) Take 2 tablets by mouth daily.       triamcinolone (KENALOG) 0.1 % cream Use in morning to rash (Patient not taking: Reported on 3/18/2019) 454 g 5        Allergies   Allergen Reactions     Nkda [No Known Drug Allergies]          Review of Systems:  -Constitutional: Otherwise feeling well today, in usual state of health.  -HEENT: Patient denies nonhealing oral sores.  -Skin: As above in HPI. No additional skin concerns.    Physical exam:  Vitals: There were no vitals taken for this visit.  GEN: This is a well developed, well-nourished female in no acute distress, in a pleasant mood.    SKIN: Full skin, which includes the head/face, both arms, chest, back, abdomen,both legs, genitalia and/or groin buttocks, digits and/or nails, was examined.  -Numerous hypopigmented macules on the trunk and legs  -No tumors or red, scaling patches on areas examined today  -Benign appearing nevus on the left vertex  scalp  -There is a solitary purple papule on the right distal shin that on dermoscopy is hyperkeratotic and has traces of dried blood. It is not TTP  No cervical, submandibular or axillary lymph nodes by palpation.  -No other lesions of concern on areas examined.     Impression/Plan:    1. CTCL    At this time, the pt's dz is stable. Although she has had 7 opr 8 small patches since the last time she was here, they have resolved with Targretin, and she does not have any systemic symptoms. Because of this, there is not an indication to do phototherapy or to obtain blood work    Told pt to call us if she develops 5 of spots at the same time, or if her CTCL spots stop responding to Targretin      2. Neoplasm of uncertain behavior on the right shin. The differential diagnosis includes SCC vs DF.     Shave biopsy:  After discussion of benefits and risks including but not limited to bleeding/bruising, pain/swelling, infection, scar, incomplete removal, nerve damage/numbness, recurrence, and non-diagnostic biopsy, written consent, verbal consent and photographs were obtained. Time-out was performed. The area was cleaned with isopropyl alcohol. 0.5ml of 1% lidocaine with 1:100,000 epinephrine was injected to obtain adequate anesthesia. A shave biopsy was performed. Hemostasis was achieved with aluminium chloride. Vaseline and a sterile dressing were applied. The patient tolerated the procedure and no complications were noted. The patient was provided with verbal and written post care instructions.    3. Multiple clinically benign nevi on the trunk and on the scalp    No further intervention required. Patient to report changes.        ESTABLISHED PATIENT  No address on file on close of this encounter.  Follow-up in 6 months, earlier for new or changing lesions.       Dr. Guillory staffed the patient.    Staff Involved:  Resident(Elio)/Staff  .I was present for key portions of the procedure. Arlen Guillory MD  .PACO,  Arlen Guillory MD, saw this patient with the resident and agree with the resident s findings and plan of care as documented in the resident s note.    Again, thank you for allowing me to participate in the care of your patient.      Sincerely,    Arlen Guillory MD

## 2019-03-19 LAB — COPATH REPORT: NORMAL

## 2019-03-21 DIAGNOSIS — C44.92 SCC (SQUAMOUS CELL CARCINOMA): Primary | ICD-10-CM

## 2019-03-26 NOTE — TELEPHONE ENCOUNTER
FUTURE VISIT INFORMATION      FUTURE VISIT INFORMATION:    Date: 4/8    Time: 245p    Location: Derm Surg  REFERRAL INFORMATION:    Referring provider:  Dr. Wasserman    Referring providers clinic:  Derm    Reason for visit/diagnosis  SCC [place]    RECORDS REQUESTED FROM:       Clinic name Comments Records Status Imaging Status   [facility] 3/18/19 Pathology EPIC                                    Patient Name: RYAN OLIVEIRA   MR#: [id number]   Specimen #: [id number]   Collected: 3/18/2019   Received: 3/18/2019   Reported: 3/19/2019 13:36   Ordering Phy(s): OSCAR WASSERMAN     For improved result formatting, select 'View Enhanced Report Format' under    Linked Documents section.     SPECIMEN(S):   Shave, right shin     FINAL DIAGNOSIS:   Skin, right shin:   - Squamous cell carcinoma, well differentiated, extending to the deep   margin - (see description)

## 2019-04-08 ENCOUNTER — PRE VISIT (OUTPATIENT)
Dept: DERMATOLOGY | Facility: CLINIC | Age: 69
End: 2019-04-08

## 2019-04-08 ENCOUNTER — OFFICE VISIT (OUTPATIENT)
Dept: DERMATOLOGY | Facility: CLINIC | Age: 69
End: 2019-04-08
Payer: COMMERCIAL

## 2019-04-08 ENCOUNTER — RECORDS - HEALTHEAST (OUTPATIENT)
Dept: ADMINISTRATIVE | Facility: OTHER | Age: 69
End: 2019-04-08

## 2019-04-08 DIAGNOSIS — C44.722 SQUAMOUS CELL CARCINOMA OF LOWER LEG, RIGHT: Primary | ICD-10-CM

## 2019-04-08 ASSESSMENT — PAIN SCALES - GENERAL: PAINLEVEL: NO PAIN (0)

## 2019-04-08 NOTE — LETTER
4/8/2019       RE: Lyndsey Hagen  [address]  [place] [address]     Dear Colleague,    Thank you for referring your patient, Lyndsey Hagen, to the [facility] DERMATOLOGIC SURGERY at [facility]. Please see a copy of my visit note below.    [facility] Dermatology Note    Dermatology Surgery Clinic  [facility]  [address] [place] [address]    Dermatology Problem List:  1. CTCL, stage IIB  - Previously treated with PUVA with good response. Treated at [place] prior to establishing care here. Stopped due to difficulty percuring oxsoralen and risks associated with PUVA.  Restarted on UVA1 with clinical remission.  Discontinue UVA1 3/12/2018  -Temporarily discontinued UVA1 (4/26/17) due to insurance  -Targretin 1% gel (added on 5/23/16) with good response.   -Triamcinolone 0.1% cream as needed for irritation from targretin gel.  2. Lesion to monitor, left superior parietal scalp Photo 8/27/2018  3. SCC, R estrella. S/p Bx 03/18/2019. [facility] date TBD    Encounter Date: Apr 8, 2019    CC:  Chief Complaint   Patient presents with     Derm Problem     Lyndsey is here today for MOHS on her Right shin due to SCC       History of Present Illness:  Ms. Lyndsey Hagen is a 68 year old female, who is has CTCL, stage IIB, who presents today for a Mohs consultation regarding a squamous cell carcinoma on her right shin. This lesion was discovered and biopsied during her visit with Dr. Guillory on 03/18/2019. There were no other skin concerns at that time. Today she reports of an upcoming vacation this month she would like to schedule her surgery around it. Her last UVA treatment was at least a year ago, and has a history of PUVA. The patient is otherwise feeling well. There are no other skin concerns at this time.      Past Medical History:   Patient Active Problem List   Diagnosis     Cutaneous T-cell lymphoma (H)     Past Medical  History:   Diagnosis Date     Cutaneous T-cell lymphoma (H)      Past Surgical History:   Procedure Laterality Date     NO HISTORY OF SURGERY  10/28/31    derm       Social History:  Social History     Socioeconomic History     Marital status:      Spouse name: None     Number of children: None     Years of education: None     Highest education level: None   Occupational History     None   Social Needs     Financial resource strain: None     Food insecurity:     Worry: None     Inability: None     Transportation needs:     Medical: None     Non-medical: None   Tobacco Use     Smoking status: Former Smoker     Packs/day: 0.30     Years: 40.00     Pack years: 12.00     Types: Cigarettes     Smokeless tobacco: Never Used   Substance and Sexual Activity     Alcohol use: None     Drug use: None     Sexual activity: None   Lifestyle     Physical activity:     Days per week: None     Minutes per session: None     Stress: None   Relationships     Social connections:     Talks on phone: None     Gets together: None     Attends Mandaeism service: None     Active member of club or organization: None     Attends meetings of clubs or organizations: None     Relationship status: None     Intimate partner violence:     Fear of current or ex partner: None     Emotionally abused: None     Physically abused: None     Forced sexual activity: None   Other Topics Concern     Parent/sibling w/ CABG, MI or angioplasty before 65F 55M? Not Asked   Social History Narrative     None       Family History:  Family History   Problem Relation Age of Onset     Cancer Mother         skin cancer     Skin Cancer Mother      Melanoma No family hx of         Medications:  Current Outpatient Medications   Medication Sig Dispense Refill     Bexarotene (TARGRETIN) 1 % GEL APPLY EVERY OTHER NIGHT TO EVERY NIGHT AS DIRECTED 60 g 5     Cholecalciferol (VITAMIN D-3 PO) Take 1 tablet by mouth daily.       Coenzyme Q10 (CO Q-10 PO) Take 1 tablet by  mouth daily.       KRILL OIL PO Take  by mouth daily.       Multiple Vitamin (MULTIVITAMINS PO) Take  by mouth daily.       Probiotic Product (SOLUBLE FIBER/PROBIOTICS PO) Take 2 tablets by mouth daily.       triamcinolone (KENALOG) 0.1 % cream Use in morning to rash (Patient not taking: Reported on 3/18/2019) 454 g 5       Allergies   Allergen Reactions     Nkda [No Known Drug Allergies]        Review of Systems:  - As per HPI  - Otherwise nml state of health. No other skin concerns.    Physical exam:  Vitals: There were no vitals taken for this visit.  GEN: This is a well developed, well-nourished female in no acute distress, in a pleasant mood.    SKIN: Focused examination of the right leg was performed.  - 1.3 cm keratotic pink nodule, nodule R pretibial lower leg  - No popliteal lymphopathy.  - No other lesions of concern on areas examined.     Impression/Plan:  1. Squamous cell carcinoma of the R shin. Mohs surgery recommended.    Discussed nature of SCC with patient.     Risks, benefits, and process of Mohs micrographic surgery were discussed including possibility of damage to surrounding anatomical structures and infection. Patient is comfortable proceeding with the surgery; consent form was obtained. She will be scheduled at her convenience.      Follow-up as scheduled for [facility].       Staff Involved:    Scribe Disclosure  I, Roshan Alejandro, am serving as a scribe to document services personally performed by Dr. Kodi Cat, based on data collection and the provider's statements to me.     Attending Attestation  I attest that the Scribe recorded the interview and exam that I personally performed.  I have reviewed the note and edited it as necessary.    Again, thank you for allowing me to participate in the care of your patient.      Sincerely,    Kodi Cat MD

## 2019-04-08 NOTE — PROGRESS NOTES
[facility] Dermatology Note    Dermatology Surgery Clinic  [facility]  Clinics and Surgery Center  [address] [place] [id number]    Dermatology Problem List:  1. CTCL, stage IIB  - Previously treated with PUVA with good response. Treated at [place] prior to establishing care here. Stopped due to difficulty percuring oxsoralen and risks associated with PUVA.  Restarted on UVA1 with clinical remission.  Discontinue UVA1 3/12/2018  -Temporarily discontinued UVA1 (4/26/17) due to insurance  -Targretin 1% gel (added on 5/23/16) with good response.   -Triamcinolone 0.1% cream as needed for irritation from targretin gel.  2. Lesion to monitor, left superior parietal scalp Photo 8/27/2018  3. SCC, R estrella. S/p Bx 03/18/2019. [facility] date TBD    Encounter Date: Apr 8, 2019    CC:  Chief Complaint   Patient presents with     Derm Problem     Lyndsey is here today for MOHS on her Right shin due to SCC       History of Present Illness:  Ms. Lyndsey Hagen is a 68 year old female, who is has CTCL, stage IIB, who presents today for a Mohs consultation regarding a squamous cell carcinoma on her right shin. This lesion was discovered and biopsied during her visit with Dr. Guillory on 03/18/2019. There were no other skin concerns at that time. Today she reports of an upcoming vacation this month she would like to schedule her surgery around it. Her last UVA treatment was at least a year ago, and has a history of PUVA. The patient is otherwise feeling well. There are no other skin concerns at this time.      Past Medical History:   Patient Active Problem List   Diagnosis     Cutaneous T-cell lymphoma (H)     Past Medical History:   Diagnosis Date     Cutaneous T-cell lymphoma (H)      Past Surgical History:   Procedure Laterality Date     NO HISTORY OF SURGERY  10/28/31    derm       Social History:  Social History     Socioeconomic History     Marital status:      Spouse name: None      Number of children: None     Years of education: None     Highest education level: None   Occupational History     None   Social Needs     Financial resource strain: None     Food insecurity:     Worry: None     Inability: None     Transportation needs:     Medical: None     Non-medical: None   Tobacco Use     Smoking status: Former Smoker     Packs/day: 0.30     Years: 40.00     Pack years: 12.00     Types: Cigarettes     Smokeless tobacco: Never Used   Substance and Sexual Activity     Alcohol use: None     Drug use: None     Sexual activity: None   Lifestyle     Physical activity:     Days per week: None     Minutes per session: None     Stress: None   Relationships     Social connections:     Talks on phone: None     Gets together: None     Attends Yazidi service: None     Active member of club or organization: None     Attends meetings of clubs or organizations: None     Relationship status: None     Intimate partner violence:     Fear of current or ex partner: None     Emotionally abused: None     Physically abused: None     Forced sexual activity: None   Other Topics Concern     Parent/sibling w/ CABG, MI or angioplasty before 65F 55M? Not Asked   Social History Narrative     None       Family History:  Family History   Problem Relation Age of Onset     Cancer Mother         skin cancer     Skin Cancer Mother      Melanoma No family hx of         Medications:  Current Outpatient Medications   Medication Sig Dispense Refill     Bexarotene (TARGRETIN) 1 % GEL APPLY EVERY OTHER NIGHT TO EVERY NIGHT AS DIRECTED 60 g 5     Cholecalciferol (VITAMIN D-3 PO) Take 1 tablet by mouth daily.       Coenzyme Q10 (CO Q-10 PO) Take 1 tablet by mouth daily.       KRILL OIL PO Take  by mouth daily.       Multiple Vitamin (MULTIVITAMINS PO) Take  by mouth daily.       Probiotic Product (SOLUBLE FIBER/PROBIOTICS PO) Take 2 tablets by mouth daily.       triamcinolone (KENALOG) 0.1 % cream Use in morning to rash (Patient not  taking: Reported on 3/18/2019) 454 g 5       Allergies   Allergen Reactions     Nkda [No Known Drug Allergies]        Review of Systems:  - As per HPI  - Otherwise nml state of health. No other skin concerns.    Physical exam:  Vitals: There were no vitals taken for this visit.  GEN: This is a well developed, well-nourished female in no acute distress, in a pleasant mood.    SKIN: Focused examination of the right leg was performed.  - 1.3 cm keratotic pink nodule, nodule R pretibial lower leg  - No popliteal lymphopathy.  - No other lesions of concern on areas examined.     Impression/Plan:  1. Squamous cell carcinoma of the R shin. Mohs surgery recommended.    Discussed nature of SCC with patient.     Risks, benefits, and process of Mohs micrographic surgery were discussed including possibility of damage to surrounding anatomical structures and infection. Patient is comfortable proceeding with the surgery; consent form was obtained. She will be scheduled at her convenience.      Follow-up as scheduled for [facility].       Staff Involved:    Scribe Disclosure  I, Roshan Alejandro, am serving as a scribe to document services personally performed by Dr. Kodi Cat, based on data collection and the provider's statements to me.     Attending Attestation  I attest that the Scribe recorded the interview and exam that I personally performed.  I have reviewed the note and edited it as necessary.    Kodi Cat M.D.    Director of Dermatologic Surgery  Department of Dermatology  [facility]

## 2019-04-08 NOTE — PATIENT INSTRUCTIONS
Mohs Cutaneous Micrographic Surgery Unit  Kodi Cat MD      Pre-Surgical Instruction Sheet    1. Expect to be here majority of the morning.  The Mohs surgical procedure can be an extensive surgery requiring multiple stages. Each stage may take 1-2 hours.    ? You may eat breakfast  ? You may bring snacks or beverages with you  ? Take all normal medications morning of surgery -- unless otherwise indicated by your physician  ? If you have an artificial heart valve, or joint replacement within two years, we will prescribe an antibiotic. Please take one hour prior to surgery.    2. You will need a  to be with you if your surgical site is close to your eyes. You can get swelling/bruising immediately after surgery and will go home with a big bulky bandage that could obstruct your view of driving safely.    3. Wear comfortable clothing -- preferably a button down shirt or a loose fitted shirt. (to avoid pulling over pressure bandage off when you get home) If your surgery site is on your leg, try wearing loose fitted pants. If your surgery site is on your arm, try wearing a short-sleeve shirt.    4. Bring reading material or other items to help pass time. We do have internet access available if you own a laptop, iPad, etc.)    5. Women - do NOT wear make-up. We will be washing it off anyone needing surgery on the face    6. Do NOT stop blood thinning medication unless instructed to do so by your surgeon. This will include: Warfarin, Coumadin, Jantoven, Aspirin, Plavix and Pradaxa. If you are taking Warfarin or Coumadin, please have your INR blood test results sent to our office no more than 7 days prior to your surgery.     7. Tylenol is a good alternative to take for headaches and pain, and can be taken throughout your surgery and postoperative recovery.    8. If your surgery is on your trunk, arms, hands, legs, feet, fingernail or a toenail, please wash the area with Hibiclens, an over-the-counter antiseptic soap,  the night before and morning of your surgery.     If you have any questions, please feel free to contact us.    Phone numbers:  During business hours (M-F 8:00-4:30 p.m.)  [facility]:  [phone] - select option 1 for appt. desk  [phone] - select option 3 for nurse triage line  [facility]:   [phone] - goes straight to call center   ---------------------------------------------------------  Evenings/Weekends/Holidays  Hospital - [phone]   TTY for hearing dzhtoqsl-[phone]  *Ask  to page dermatologist on-call  Emergency Pjcy-[phone]  TTY for hearing impaired- [phone]

## 2019-04-08 NOTE — NURSING NOTE
Chief Complaint   Patient presents with     Derm Problem     Lyndsey is here today for MOHS on her Right shin due to SCC     Avis Leon LPN

## 2019-04-12 NOTE — TELEPHONE ENCOUNTER
FUTURE VISIT INFORMATION      FUTURE VISIT INFORMATION:    Date: 4/29    Time: 730a    Location: Derm Surg  REFERRAL INFORMATION:    Referring provider:  Dr. Guillory    Referring providers clinic:  Derm    Reason for visit/diagnosis  SCC    RECORDS REQUESTED FROM:       Clinic name Comments Records Status Imaging Status   Internal 3/18/19 Pathology EPIC

## 2019-04-29 ENCOUNTER — PRE VISIT (OUTPATIENT)
Dept: DERMATOLOGY | Facility: CLINIC | Age: 69
End: 2019-04-29

## 2019-04-29 ENCOUNTER — RECORDS - HEALTHEAST (OUTPATIENT)
Dept: ADMINISTRATIVE | Facility: OTHER | Age: 69
End: 2019-04-29

## 2019-04-29 ENCOUNTER — OFFICE VISIT (OUTPATIENT)
Dept: DERMATOLOGY | Facility: CLINIC | Age: 69
End: 2019-04-29
Payer: COMMERCIAL

## 2019-04-29 VITALS — DIASTOLIC BLOOD PRESSURE: 88 MMHG | SYSTOLIC BLOOD PRESSURE: 134 MMHG | HEART RATE: 82 BPM

## 2019-04-29 DIAGNOSIS — C44.722 SQUAMOUS CELL CARCINOMA OF RIGHT LOWER LEG: Primary | ICD-10-CM

## 2019-04-29 ASSESSMENT — PAIN SCALES - GENERAL
PAINLEVEL: NO PAIN (0)
PAINLEVEL: NO PAIN (0)

## 2019-04-29 NOTE — PROGRESS NOTES
[facility] Mohs Dermatologic Surgery Procedure Note    Dermatology Surgery Clinic  [facility]  Clinics and Surgery Center  [address] [place] [id number]    Date of Service:  Apr 29, 2019  Surgery: Mohs micrographic surgery    Surgeon: Stephania    Case 1  Repair Type: secondary  Repair Size: 1.5 x 1.6 cm  Suture Material: N/A  Tumor Type: SCC - Squamous cell carcinoma  Location: Right shin  Derm-Path Accession #: [id number]  PreOp Size: 0.8x1.1 cm  PostOp Size: 1.5x1.6 cm  Mohs Accession #:  IM  Level of Defect: fat    Procedure:  We discussed the principles of treatment and most likely complications including scarring, bleeding, infection, swelling, pain, crusting, nerve damage, large wound,  incomplete excision, wound dehiscence,  nerve damage, recurrence, and a second procedure may be recommended to obtain the best cosmetic or functional result.    Informed consent was obtained and the patient underwent the procedure as follows:  The patient was placed supine on the operating table.  The cancer was identified, outlined with a marker, and verified by the patient.  The entire surgical field was prepped with Hibiclens.  The surgical site was anesthetized using Lidocaine 1% with epi 1:100,000.    The area of clinically apparent tumor was not debulked. The layer of tissue was then surgically excised using a #15 blade and was then transferred onto a specimen sheet maintaining the orientation of the specimen. Hemostasis was obtained using heat cautery. The wound site was then covered with a dressing while the tissue samples were processed for examination.    The excised tissue was transported to the Mohs histology laboratory maintaining the tissue orientation.  The tissue specimen was relaxed so that the entire surgical margin was in a a single horizontal plane for sectioning and inked for precise mapping.  A precise reference map was drawn to reflect the sectioning of  the specimen, colored inking of the margins, and orientation on the patient. The tissue was processed using horizontal sectioning of the base and continuous peripheral margins.  The histopathologic sections were reviewed in conjunction with the reference map.    Total blocks: 1    Total slides:  3    There were no cancer cells visualized on examination, therefore Mohs surgery was complete.      EVALUATION OF MOHS DEFECT FOR RECONSTRUCTION: Second Intent     The patient is status post Mohs' micrographic surgery.  The surgical site was examined with attention to normal anatomic and functional relationships.  After consideration and discussion of multiple options with the patient, it was determined that healing by second intention would offer the best chance for preservation/restoration of all normal anatomic and functional relationships.  The patient verbalized understanding and agrees with this plan. It is also understood that should second intention healing be sub-optimal, additional procedures such as scar revision, steroid injection or dermabrasion may be recommended.    The open wound was cleaned with Hibiclens and a thick layer of ointment was applied.  A pressure dressing consisting of non-adherent gauze, gauze and hypafixwas applied.   Wound care was discussed with patient both orally and in writing.  The patient stated understanding and agreement of the course of care.      Staff Involved:    Scribe Disclosure  I, Roshan Alejandro, am serving as a scribe to document services personally performed by Dr. Kodi Cat, based on data collection and the provider's statements to me.     Attending attestation:  I personally performed the entire procedure.  I have reviewed the note and edited it as necessary, and agree with its contents.    Kodi Cat M.D.  Professor  Director of Dermatologic Surgery  Department of Dermatology  [facility]    Dermatology Surgery Clinic  [facility]  [facility] and Surgery [address] [address]

## 2019-04-29 NOTE — NURSING NOTE
The surgical site was dressed with Vaseline, Telfa and a pressure dressing wrapped with Coban. Wound care was gone over with the patient, all questions were answered.   Carmen Wyatt CMA

## 2019-04-29 NOTE — PATIENT INSTRUCTIONS
Wound Care Instructions  I will experience scar, altered skin color, bleeding, swelling, pain, crusting and redness. I may experience altered sensation. Risks are excessive bleeding, infection, muscle weakness, thick (hypertrophic or keloidal) scar, and recurrence,. A second procedure may be recommended to obtain the best cosmetic or functional result.  Possible complications of any surgical procedure are bleeding, infection, scarring, alteration in skin color and sensation, muscle weakness in the area, wound dehiscence or seperation, or recurrence of the lesion or disease. On occasion, after healing, a secondary procedure or revision may be recommended in order to obtain the best cosmetic or functional result.   After your surgery, a pressure bandage will be placed over the area that has sutures. This will help prevent bleeding. Please follow these instruction, as they will help you to prevent complications as your wound heals.  For the First 48 hours After Surgery:  1. Leave the pressure bandage on and keep it dry. If it should come loose, you may retape it, but do not take it off.  2. Relax and take it easy. Do not do any vigorous exercise, heavy lifting, or bending forward. This could cause the wound to bleed.  3. Post-operative pain is usually mild. You may take plain or extra strength Tylenol every 4 hours as needed (do not take more than 4,000mg in one day). Do not take any medicine that contains aspirin, ibuprofen or motrin unless you have been recommended these by a doctor.  Avoid alcohol and vitamin E as these may increase your tendency to bleed.  4. You may put an ice pack around the bandaged area for 20 minutes every 2-3 hours. This may help reduce swelling, bruising, and pain. Make sure the ice pack is waterproof so that the pressure bandage does not get wet.   5. You may see a small amount of drainage or blood on your pressure bandage. This is normal. However, if drainage or bleeding continues or  saturates the bandage, you will need to apply firm pressure over the bandage with a washcloth for 15 minutes. If bleeding continues after applying pressure for 15 minutes then go to the nearest emergency room.  48 Hours After Surgery  Carefully remove the bandage and start daily wound care and dressing changes. You may also now shower and get the wound wet. Wash wound with a mild soap and water.  Use caution when washing the wound. Be gentle and do not let the forceful shower stream hit the wound directly.  PAT dry.  Daily Wound Care:  1. Wash wound with a mild soap and water.  Use caution when washing the wound, be gentle and do not let the forceful shower stream hit the wound directly.  2. PAT DRY.  3. Apply Vaseline (from a new container or tube) over the suture line with a Q-tip. It is very important to keep the wound continuously moist, as wounds heal best in a moist environment.  4.  Keep the site covered until sutures are removed, you can cover it with a Telfa (non-stick) dressing and tape or a band-aid.    5. If you are unable to keep wound covered, you must apply Vaseline every 2 - 3 hours (while awake) to ensure it is being kept moist for optimal healing. A dressing overnight is recommended to keep the area moist.   Call Us If:  1. You have pain that is not controlled with Tylenol.  2. You have signs or symptoms of an infection, such as: fever over 100 degrees F, redness, warmth, or foul-smelling or yellow/creamy drainage from the wound.  Who should I call with questions?    [facility]: [phone]     [facility]: [phone]    For urgent needs outside of business hours call the [facility] at [phone] and ask for the dermatology resident on call

## 2019-04-29 NOTE — NURSING NOTE
Chief Complaint   Patient presents with     Skin Cancer     Lyndsey is here today to have MOHS on the right estrella for an SCC.      Carmen Wyatt, CMA

## 2019-04-29 NOTE — LETTER
4/29/2019       RE: Lyndsey Hagen  [address]  [place] [address]     Dear Colleague,    Thank you for referring your patient, Lyndsey Hagen, to the [facility] DERMATOLOGIC SURGERY at [facility]. Please see a copy of my visit note below.            Picture placed in chart for future reference.       [facility] Mohs Dermatologic Surgery Procedure Note    Dermatology Surgery Clinic  [facility]  Clinics and Surgery Center  [address] [place] [address]    Date of Service:  Apr 29, 2019  Surgery: Mohs micrographic surgery    Surgeon: Stephania    Case 1  Repair Type: secondary  Repair Size: 1.5 x 1.6 cm  Suture Material: N/A  Tumor Type: SCC - Squamous cell carcinoma  Location: Right shin  Derm-Path Accession #: [id number]  PreOp Size: 0.8x1.1 cm  PostOp Size: 1.5x1.6 cm  Mohs Accession #:  IM  Level of Defect: fat    Procedure:  We discussed the principles of treatment and most likely complications including scarring, bleeding, infection, swelling, pain, crusting, nerve damage, large wound,  incomplete excision, wound dehiscence,  nerve damage, recurrence, and a second procedure may be recommended to obtain the best cosmetic or functional result.    Informed consent was obtained and the patient underwent the procedure as follows:  The patient was placed supine on the operating table.  The cancer was identified, outlined with a marker, and verified by the patient.  The entire surgical field was prepped with Hibiclens.  The surgical site was anesthetized using Lidocaine 1% with epi 1:100,000.    The area of clinically apparent tumor was not debulked. The layer of tissue was then surgically excised using a #15 blade and was then transferred onto a specimen sheet maintaining the orientation of the specimen. Hemostasis was obtained using heat cautery. The wound site was then covered with a dressing while the tissue samples were  processed for examination.    The excised tissue was transported to the [facility] histology laboratory maintaining the tissue orientation.  The tissue specimen was relaxed so that the entire surgical margin was in a a single horizontal plane for sectioning and inked for precise mapping.  A precise reference map was drawn to reflect the sectioning of the specimen, colored inking of the margins, and orientation on the patient. The tissue was processed using horizontal sectioning of the base and continuous peripheral margins.  The histopathologic sections were reviewed in conjunction with the reference map.    Total blocks: 1    Total slides:  3    There were no cancer cells visualized on examination, therefore Mohs surgery was complete.      EVALUATION OF MOHS DEFECT FOR RECONSTRUCTION: Second Intent     The patient is status post Mohs' micrographic surgery.  The surgical site was examined with attention to normal anatomic and functional relationships.  After consideration and discussion of multiple options with the patient, it was determined that healing by second intention would offer the best chance for preservation/restoration of all normal anatomic and functional relationships.  The patient verbalized understanding and agrees with this plan. It is also understood that should second intention healing be sub-optimal, additional procedures such as scar revision, steroid injection or dermabrasion may be recommended.    The open wound was cleaned with Hibiclens and a thick layer of ointment was applied.  A pressure dressing consisting of non-adherent gauze, gauze and hypafixwas applied.   Wound care was discussed with patient both orally and in writing.  The patient stated understanding and agreement of the course of care.      Staff Involved:    Scribe Disclosure  I, Roshan Alejandro, am serving as a scribe to document services personally performed by Dr. Kodi Cat, based on data collection and the provider's  statements to me.     Attending attestation:  I personally performed the entire procedure.  I have reviewed the note and edited it as necessary, and agree with its contents.    Kodi Cat M.D.  Professor  Director of Dermatologic Surgery  Department of Dermatology  [facility]    Dermatology Surgery Clinic  [facility] Surgery [place] [phone]

## 2019-04-30 ENCOUNTER — TELEPHONE (OUTPATIENT)
Dept: DERMATOLOGY | Facility: CLINIC | Age: 69
End: 2019-04-30

## 2019-04-30 NOTE — TELEPHONE ENCOUNTER
Follow up call completed following Mohs procedure with Dr. Cat.     The following questions were asked:    What are you doing to manage your pain? Tylenol  Is it helping? Yes  Are you applying ice?  No  Have you had any noticeable bleeding through the bandage? No   Do you have any concerns? No         Please call [phone] option 3 if you have any additional questions.    Avis Leon LPN

## 2019-05-13 ENCOUNTER — OFFICE VISIT (OUTPATIENT)
Dept: DERMATOLOGY | Facility: CLINIC | Age: 69
End: 2019-05-13
Payer: COMMERCIAL

## 2019-05-13 ENCOUNTER — RECORDS - HEALTHEAST (OUTPATIENT)
Dept: ADMINISTRATIVE | Facility: OTHER | Age: 69
End: 2019-05-13

## 2019-05-13 DIAGNOSIS — C44.722 SQUAMOUS CELL CARCINOMA OF RIGHT LOWER LEG: Primary | ICD-10-CM

## 2019-05-13 ASSESSMENT — PAIN SCALES - GENERAL: PAINLEVEL: NO PAIN (0)

## 2019-05-13 NOTE — LETTER
5/13/2019       RE: Lyndsey Hagen  [address]  [place] [address]     Dear Colleague,    Thank you for referring your patient, Lyndsey Hagen, to the [facility] DERMATOLOGIC SURGERY at [facility]. Please see a copy of my visit note below.    [facility] Dermatology Post-operative Visit note:  Date: May 13, 2019   Dermatology Surgery Clinic  [facility] and [facility] Center  [address] [place], [place] [id number]  The patient presents for the follow up from their previous surgery Details are below.     Original Procedure Date: 04/29/2019   Reason for visit: MMS for SCC   Bleeding that required medical attention: none   Infection: none   Hospitalization for procedure within 30 days: none   Are you having any functional problems since the surgery: N/A     Mohs Case 1:  Procedure Location: R shin  Type of Repair: secondary intent  Is patient happy with appearance of scar: N/A  On 1-10 scale, with 10 being how the area looked before the surgery and 1 being the worst imaginable scar, how do you think it looks today?: N/A    Assessment and Plan: Well granulating surgical site on the shin; the wound has contracted to 2/3 of initial size. Instructed Pt to stop compression stocking, and begin use of regular bandages.    Instructed to continue daily dressing changes and application of petroleum jelly until healed over with new pink skin and all sutures are dissolved.     Recommended sunscreens SPF #50 or greater and protective clothing. Risk of scar dyspigmentation discussed.     Continue periodic self skin exams and report of any new or changing lesions.     Please refer to previous clinic note for details regarding treatment.    Follow up in 4 weeks.      Staff Involved:    Scribe Disclosure  I, Roshan Alejandro, am serving as a scribe to document services personally performed by Dr. Kodi Cat, based on data collection and the  provider's statements to me.     Attending Attestation  I attest that the Scribe recorded the interview and exam that I personally performed.  I have reviewed the note and edited it as necessary.    Kodi Cat M.D.  Professor  Director of Dermatologic Surgery  Department of Dermatology  [facility]

## 2019-05-13 NOTE — NURSING NOTE
Chief Complaint   Patient presents with     Derm Problem     Patient is here today for a 2 week post op Mohs right estrella.      Soraida Giles CMA

## 2019-05-13 NOTE — PROGRESS NOTES
[facility] Dermatology Post-operative Visit note:  Date: May 13, 2019   Dermatology Surgery Clinic  [facility] and [facility]  [address] [place] [address]  The patient presents for the follow up from their previous surgery Details are below.     Original Procedure Date: 04/29/2019   Reason for visit: MMS for SCC   Bleeding that required medical attention: none   Infection: none   Hospitalization for procedure within 30 days: none   Are you having any functional problems since the surgery: N/A     Mohs Case 1:  Procedure Location: R shin  Type of Repair: secondary intent  Is patient happy with appearance of scar: N/A  On 1-10 scale, with 10 being how the area looked before the surgery and 1 being the worst imaginable scar, how do you think it looks today?: N/A    Assessment and Plan: Well granulating surgical site on the shin; the wound has contracted to 2/3 of initial size. Instructed Pt to stop compression stocking, and begin use of regular bandages.    Instructed to continue daily dressing changes and application of petroleum jelly until healed over with new pink skin and all sutures are dissolved.     Recommended sunscreens SPF #50 or greater and protective clothing. Risk of scar dyspigmentation discussed.     Continue periodic self skin exams and report of any new or changing lesions.     Please refer to previous clinic note for details regarding treatment.    Follow up in 4 weeks.      Staff Involved:    Scribe Disclosure  I, Roshan Alejandro, am serving as a scribe to document services personally performed by Dr. Kodi Cat, based on data collection and the provider's statements to me.     Attending Attestation  I attest that the Scribe recorded the interview and exam that I personally performed.  I have reviewed the note and edited it as necessary.    Kodi Cat M.D.  Professor  Director of Dermatologic Surgery  Department of  Dermatology  [facility]

## 2019-05-21 ENCOUNTER — DOCUMENTATION ONLY (OUTPATIENT)
Dept: CARE COORDINATION | Facility: CLINIC | Age: 69
End: 2019-05-21

## 2019-06-06 ENCOUNTER — RECORDS - HEALTHEAST (OUTPATIENT)
Dept: ADMINISTRATIVE | Facility: OTHER | Age: 69
End: 2019-06-06

## 2019-06-06 ENCOUNTER — OFFICE VISIT (OUTPATIENT)
Dept: DERMATOLOGY | Facility: CLINIC | Age: 69
End: 2019-06-06
Payer: COMMERCIAL

## 2019-06-06 DIAGNOSIS — C44.722 SQUAMOUS CELL CARCINOMA OF RIGHT LOWER LEG: Primary | ICD-10-CM

## 2019-06-06 ASSESSMENT — PAIN SCALES - GENERAL: PAINLEVEL: NO PAIN (0)

## 2019-06-06 NOTE — NURSING NOTE
Chief Complaint   Patient presents with     Derm Problem     wound check R shin, reports more redness than usual, no pain     Melinda Akers, EMT

## 2019-06-06 NOTE — LETTER
6/6/2019       RE: Lyndsey Hagen  [address]  [place] [address]     Dear Colleague,    Thank you for referring your patient, Lyndsey Hagen, to the [facility] DERMATOLOGIC SURGERY at [facility]. Please see a copy of my visit note below.    [facility] Dermatology Post-operative Visit note:  Date: June 6, 2019  Dermatology Surgery Clinic  [facility]  Clinics and Surgery Center  [address] [place], [place] [id number]  The patient presents for the follow up from their previous surgery Details are below.     Original Procedure Date: 04/29/2019   Reason for visit: MMS for SCC   Bleeding that required medical attention: none   Infection: none   Hospitalization for procedure within 30 days: none   Are you having any functional problems since the surgery: N/A     Mohs Case 1:  Procedure Location: R shin  Type of Repair: secondary intent  Is patient happy with appearance of scar: N/A  On 1-10 scale, with 10 being how the area looked before the surgery and 1 being the worst imaginable scar, how do you think it looks today?: N/A    Assessment and Plan: Upon review, there was an almost completely healed surgical site, with a 6x6mm open area. Pt to continue wound care for 1 week, then can resume normal activity.    Recommended sunscreens SPF #50 or greater and protective clothing. Risk of scar dyspigmentation discussed.     Continue periodic self skin exams and report of any new or changing lesions.     Please refer to previous clinic note for details regarding treatment.    Follow up to follow up with General Dermatology.        Photo placed into patients chart note for further reference.       Staff Involved:    Scribe Disclosure  I, Roshan Alejandro, am serving as a scribe to document services personally performed by Dr. Kodi Cat, based on data collection and the provider's statements to me.     Attending Attestation  I attest that the  Samara recorded the interview and exam that I personally performed.  I have reviewed the note and edited it as necessary.    Kodi Cat M.D.  Professor  Director of Dermatologic Surgery  Department of Dermatology  [facility]

## 2019-06-06 NOTE — PROGRESS NOTES
[facility] Dermatology Post-operative Visit note:  Date: June 6, 2019  Dermatology Surgery Clinic  [facility]  Clinics and Surgery Center  [address] [place], [place] [address]  The patient presents for the follow up from their previous surgery Details are below.     Original Procedure Date: 04/29/2019   Reason for visit: MMS for SCC   Bleeding that required medical attention: none   Infection: none   Hospitalization for procedure within 30 days: none   Are you having any functional problems since the surgery: N/A     Mohs Case 1:  Procedure Location: R shin  Type of Repair: secondary intent  Is patient happy with appearance of scar: N/A  On 1-10 scale, with 10 being how the area looked before the surgery and 1 being the worst imaginable scar, how do you think it looks today?: N/A    Assessment and Plan: Upon review, there was an almost completely healed surgical site, with a 6x6mm open area. Pt to continue wound care for 1 week, then can resume normal activity.    Recommended sunscreens SPF #50 or greater and protective clothing. Risk of scar dyspigmentation discussed.     Continue periodic self skin exams and report of any new or changing lesions.     Please refer to previous clinic note for details regarding treatment.    Follow up to follow up with General Dermatology.        Photo placed into patients chart note for further reference.       Staff Involved:    Scribe Disclosure  I, Roshan Alejandro, am serving as a scribe to document services personally performed by Dr. Kodi Cat, based on data collection and the provider's statements to me.     Attending Attestation  I attest that the Scribe recorded the interview and exam that I personally performed.  I have reviewed the note and edited it as necessary.    Kodi Cat M.D.  Professor  Director of Dermatologic Surgery  Department of Dermatology  [facility]

## 2019-06-20 ENCOUNTER — COMMUNICATION - HEALTHEAST (OUTPATIENT)
Dept: INTERNAL MEDICINE | Facility: CLINIC | Age: 69
End: 2019-06-20

## 2019-07-11 ENCOUNTER — HOSPITAL ENCOUNTER (OUTPATIENT)
Dept: MAMMOGRAPHY | Facility: CLINIC | Age: 69
Discharge: HOME | End: 2019-07-11

## 2019-07-11 ENCOUNTER — OFFICE VISIT - HEALTHEAST (OUTPATIENT)
Dept: INTERNAL MEDICINE | Facility: CLINIC | Age: 69
End: 2019-07-11

## 2019-07-11 ENCOUNTER — HOSPITAL ENCOUNTER (OUTPATIENT)
Dept: ULTRASOUND IMAGING | Facility: CLINIC | Age: 69
Discharge: HOME | End: 2019-07-11

## 2019-07-11 ENCOUNTER — COMMUNICATION - HEALTHEAST (OUTPATIENT)
Dept: INTERNAL MEDICINE | Facility: CLINIC | Age: 69
End: 2019-07-11

## 2019-07-11 DIAGNOSIS — Z12.31 VISIT FOR SCREENING MAMMOGRAM: ICD-10-CM

## 2019-07-11 DIAGNOSIS — M85.89 OSTEOPENIA OF MULTIPLE SITES: ICD-10-CM

## 2019-07-11 DIAGNOSIS — E78.5 HYPERLIPIDEMIA LDL GOAL <130: ICD-10-CM

## 2019-07-11 DIAGNOSIS — Z82.49 FAMILY HISTORY OF ANEURYSM: ICD-10-CM

## 2019-07-11 DIAGNOSIS — Z12.4 SCREENING FOR CERVICAL CANCER: ICD-10-CM

## 2019-07-11 DIAGNOSIS — R31.29 MICROSCOPIC HEMATURIA: ICD-10-CM

## 2019-07-11 DIAGNOSIS — Z13.220 SCREENING FOR HYPERLIPIDEMIA: ICD-10-CM

## 2019-07-11 DIAGNOSIS — N89.8 VAGINAL DISCHARGE: ICD-10-CM

## 2019-07-11 DIAGNOSIS — C84.A0 CUTANEOUS T-CELL LYMPHOMA, UNSPECIFIED BODY REGION (H): ICD-10-CM

## 2019-07-11 DIAGNOSIS — Z00.00 ROUTINE GENERAL MEDICAL EXAMINATION AT A HEALTH CARE FACILITY: ICD-10-CM

## 2019-07-11 LAB
ALBUMIN UR-MCNC: NEGATIVE MG/DL
ANION GAP SERPL CALCULATED.3IONS-SCNC: 10 MMOL/L (ref 5–18)
APPEARANCE UR: CLEAR
BILIRUB UR QL STRIP: NEGATIVE
BUN SERPL-MCNC: 18 MG/DL (ref 8–22)
CALCIUM SERPL-MCNC: 10.6 MG/DL (ref 8.5–10.5)
CHLORIDE BLD-SCNC: 105 MMOL/L (ref 98–107)
CHOLEST SERPL-MCNC: 266 MG/DL
CLUE CELLS: NORMAL
CO2 SERPL-SCNC: 25 MMOL/L (ref 22–31)
COLOR UR AUTO: YELLOW
CREAT SERPL-MCNC: 0.87 MG/DL (ref 0.6–1.1)
ERYTHROCYTE [DISTWIDTH] IN BLOOD BY AUTOMATED COUNT: 11.8 % (ref 11–14.5)
FASTING STATUS PATIENT QL REPORTED: YES
GFR SERPL CREATININE-BSD FRML MDRD: >60 ML/MIN/1.73M2
GLUCOSE BLD-MCNC: 86 MG/DL (ref 70–125)
GLUCOSE UR STRIP-MCNC: NEGATIVE MG/DL
HCT VFR BLD AUTO: 46.1 % (ref 35–47)
HDLC SERPL-MCNC: 64 MG/DL
HGB BLD-MCNC: 15.6 G/DL (ref 12–16)
HGB UR QL STRIP: NEGATIVE
KETONES UR STRIP-MCNC: NEGATIVE MG/DL
LDLC SERPL CALC-MCNC: 181 MG/DL
LEUKOCYTE ESTERASE UR QL STRIP: NEGATIVE
MCH RBC QN AUTO: 33.1 PG (ref 27–34)
MCHC RBC AUTO-ENTMCNC: 33.9 G/DL (ref 32–36)
MCV RBC AUTO: 98 FL (ref 80–100)
NITRATE UR QL: NEGATIVE
PH UR STRIP: 6.5 [PH] (ref 5–8)
PLATELET # BLD AUTO: 285 THOU/UL (ref 140–440)
PMV BLD AUTO: 7.2 FL (ref 7–10)
POTASSIUM BLD-SCNC: 3.9 MMOL/L (ref 3.5–5)
RBC # BLD AUTO: 4.73 MILL/UL (ref 3.8–5.4)
SODIUM SERPL-SCNC: 140 MMOL/L (ref 136–145)
SP GR UR STRIP: 1.02 (ref 1–1.03)
TRICHOMONAS, WET PREP: NORMAL
TRIGL SERPL-MCNC: 104 MG/DL
UROBILINOGEN UR STRIP-ACNC: NORMAL
WBC: 6.2 THOU/UL (ref 4–11)
YEAST, WET PREP: NORMAL

## 2019-07-11 ASSESSMENT — MIFFLIN-ST. JEOR: SCORE: 1294.05

## 2019-07-12 LAB
HPV SOURCE: NORMAL
HUMAN PAPILLOMA VIRUS 16 DNA: NEGATIVE
HUMAN PAPILLOMA VIRUS 18 DNA: NEGATIVE
HUMAN PAPILLOMA VIRUS FINAL DIAGNOSIS: NORMAL
HUMAN PAPILLOMA VIRUS OTHER HR: NEGATIVE
SPECIMEN DESCRIPTION: NORMAL

## 2019-07-17 ENCOUNTER — COMMUNICATION - HEALTHEAST (OUTPATIENT)
Dept: INTERNAL MEDICINE | Facility: CLINIC | Age: 69
End: 2019-07-17

## 2019-07-17 LAB
BKR LAB AP ABNORMAL BLEEDING: NO
BKR LAB AP BIRTH CONTROL/HORMONES: NORMAL
BKR LAB AP CERVICAL APPEARANCE: NORMAL
BKR LAB AP GYN ADEQUACY: NORMAL
BKR LAB AP GYN INTERPRETATION: NORMAL
BKR LAB AP HPV REFLEX: NORMAL
BKR LAB AP LMP: NORMAL
BKR LAB AP PATIENT STATUS: NO
BKR LAB AP PREVIOUS ABNORMAL: NORMAL
BKR LAB AP PREVIOUS NORMAL: 2014
HIGH RISK?: NO
PATH REPORT.COMMENTS IMP SPEC: NORMAL
RESULT FLAG (HE HISTORICAL CONVERSION): NORMAL

## 2019-09-17 ENCOUNTER — OFFICE VISIT (OUTPATIENT)
Dept: DERMATOLOGY | Facility: CLINIC | Age: 69
End: 2019-09-17
Payer: COMMERCIAL

## 2019-09-17 DIAGNOSIS — Z85.828 HX OF SQUAMOUS CELL CARCINOMA OF SKIN: ICD-10-CM

## 2019-09-17 DIAGNOSIS — D22.9 MULTIPLE BENIGN NEVI: ICD-10-CM

## 2019-09-17 DIAGNOSIS — C84.A0 CUTANEOUS T-CELL LYMPHOMA, UNSPECIFIED BODY REGION (H): Primary | ICD-10-CM

## 2019-09-17 ASSESSMENT — PAIN SCALES - GENERAL: PAINLEVEL: NO PAIN (0)

## 2019-09-17 NOTE — PROGRESS NOTES
[facility] Dermatology Note      Dermatology Problem List:  1. CTCL, stage IIB  - Previously treated with PUVA with good response. Treated at [place] prior to establishing care here. Stopped due to difficulty percuring oxsoralen and risks associated with PUVA.  Restarted on UVA1 with clinical remission.  Discontinue UVA1 3/12/2018  -Temporarily discontinued UVA1 (4/26/17) due to insurance  -Targretin 1% gel (added on 5/23/16) with good response.   -Triamcinolone 0.1% cream as needed for irritation from targretin gel.  2. Lesion to monitor, left superior parietal scalp Photo 8/27/2018  3. 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          History of Present Illness:  Ms. Lyndsey Hagen is a 68 year old female who presents as a follow-up for CTCL. The patient was last seen 3/21/2019 when she was continued on Targretin 1% gel and triamcinolone 0.1% cream. She also had an SCC shaved on right shin which was subsequently referred to Dr. Cat.    She notes adequate control at present on targretin and triamcinolone.  Since last visit, she has noted 3 or 4 lesions.  She is been applying the targretin with adequate response.  She denies any lesions of concern.  She is healing well after Mohs.  She is otherwise endorsing good sun protection. Otherwise in normal state of health without changes to medications or recent hospitalization. No additional worrisome lesions; no bleeding, tender, enlarging or changing spots of concern.    Past Medical History:   Patient Active Problem List   Diagnosis     Cutaneous T-cell lymphoma (H)     Past Medical History:   Diagnosis Date     Cutaneous T-cell lymphoma (H)      Past Surgical History:   Procedure Laterality Date     NO HISTORY OF SURGERY  10/28/31    derm       Social  History:  Patient reports that she has quit smoking. Her smoking use included cigarettes. She has a 12.00 pack-year smoking history. She has never used smokeless tobacco.    Family History:  Family History   Problem Relation Age of Onset     Cancer Mother         skin cancer     Skin Cancer Mother      Melanoma No family hx of        Medications:  Current Outpatient Medications   Medication Sig Dispense Refill     Bexarotene (TARGRETIN) 1 % GEL APPLY EVERY OTHER NIGHT TO EVERY NIGHT AS DIRECTED 60 g 5     Cholecalciferol (VITAMIN D-3 PO) Take 1 tablet by mouth daily.       Coenzyme Q10 (CO Q-10 PO) Take 1 tablet by mouth daily.       KRILL OIL PO Take  by mouth daily.       Multiple Vitamin (MULTIVITAMINS PO) Take  by mouth daily.       Probiotic Product (SOLUBLE FIBER/PROBIOTICS PO) Take 2 tablets by mouth daily.       triamcinolone (KENALOG) 0.1 % cream Use in morning to rash 454 g 5        Allergies   Allergen Reactions     Nkda [No Known Drug Allergies]          Review of Systems:  -As per HPI  -Constitutional: Otherwise feeling well today, in usual state of health. No fevers chills night sweats; stable weight.  -HEME/Lymph: no LAD  -HEENT: Patient denies nonhealing oral sores.  -Skin: As above in HPI. No additional skin concerns.    Physical exam:  GEN: This is a well developed, well-nourished female in no acute distress, in a pleasant mood.    SKIN: Full skin, which includes the head/face, both arms, chest, back, abdomen,both legs, genitalia and/or groin buttocks, digits and/or nails, was examined.  -Regalado skin type: II-III  -Atrophic a mildly indurated plaque on right posterior shoulder with slight overlying scale  -Scaly annular plaques on the left superior back and shoulder  -Single indurrated and erythematous papule on the left posterior shoulder  -Waxy stuck on appearing papules with oily scale x 2 on the scalp  -Uniform appearing pigmented macule with true pigment network and w/o concerning  features on dermoscopy  -There are dome shaped bright red papules on the trunk.   -Multiple regular brown pigmented macules and papules are identified on the head, trunk and extremities.   -There are fine lines and dyspigmentation on sun exposed areas of the face and chest.  -Scattered brown macules on sun exposed areas.  -There are waxy stuck on tan to brown papules on the trunk and extremities .  -No cervical, supraclavicular, axillary or inguinal LAD  -No other lesions of concern on areas examined.     Impression/Plan:  1. CTCL: excellent control at present off UVA1; will continue present topical regiment with targretin spot treatment.    Continue Targretin 1% gel (added on 5/23/16) with good response    Continue triamcinolone 0.1% cream as needed for irritation from targretin gel    Continue to defer phototherapy and lab evaluation at present - should patient experience greater than 5 new lesion or any tumors, will expedite follow-up, but ok to space to 6 mo visits    2. 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 TBSE 9/17/19 reassuring.    ABCDs of melanoma were discussed and self skin checks were advised.     Sun precaution was advised including the use of sun screens of SPF 30 or higher, sun protective clothing, and avoidance of tanning beds.    Will continue TBSE with all follow-up visit given increased risk from above    3. Benign lesions (benign pigmented nevi, cherry angiomata, seborrheic keratoses)    ABCDs of melanoma were discussed and self skin checks were advised.     Reassurance provided and benign nature of condition discussed    Follow-up in 6 months, earlier for new or changing lesions.        staffed the patient.    Staff Involved:  Resident(Ravin)/Staff(above)  I, Arlen Guillory MD, saw this patient with the resident and agree with the resident s findings and plan of care as documented in the resident s note.

## 2019-09-17 NOTE — PATIENT INSTRUCTIONS
WE'll plan for 6 mo follow-up. Keep an eye out for any non-healing / bleeding spots or any new CTCL lesions. If you find any of the former or 5 or more CTCL lesions, call in and we'll see you sooner.

## 2019-09-17 NOTE — LETTER
9/17/2019       RE: Lyndsey Hagen  [address]  [place] [address]     Dear Colleague,    Thank you for referring your patient, Lyndsey Hagen, to the [facility] DERMATOLOGY at [facility]. Please see a copy of my visit note below.    [facility] Dermatology Note      Dermatology Problem List:  1. CTCL, stage IIB  - Previously treated with PUVA with good response. Treated at [place] prior to establishing care here. Stopped due to difficulty percuring oxsoralen and risks associated with PUVA.  Restarted on UVA1 with clinical remission.  Discontinue UVA1 3/12/2018  -Temporarily discontinued UVA1 (4/26/17) due to insurance  -Targretin 1% gel (added on 5/23/16) with good response.   -Triamcinolone 0.1% cream as needed for irritation from targretin gel.  2. Lesion to monitor, left superior parietal scalp Photo 8/27/2018  3. 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          History of Present Illness:  Ms. Lyndsey Hagen is a 68 year old female who presents as a follow-up for CTCL. The patient was last seen 3/21/2019 when she was continued on Targretin 1% gel and triamcinolone 0.1% cream. She also had an SCC shaved on right shin which was subsequently referred to Dr. Cat.    She notes adequate control at present on targretin and triamcinolone.  Since last visit, she has noted 3 or 4 lesions.  She is been applying the targretin with adequate response.  She denies any lesions of concern.  She is healing well after Mohs.  She is otherwise endorsing good sun protection. Otherwise in normal state of health without changes to medications or recent hospitalization. No additional worrisome lesions; no bleeding, tender, enlarging or changing spots of concern.    Past Medical History:    Patient Active Problem List   Diagnosis     Cutaneous T-cell lymphoma (H)     Past Medical History:   Diagnosis Date     Cutaneous T-cell lymphoma (H)      Past Surgical History:   Procedure Laterality Date     NO HISTORY OF SURGERY  10/28/31    derm       Social History:  Patient reports that she has quit smoking. Her smoking use included cigarettes. She has a 12.00 pack-year smoking history. She has never used smokeless tobacco.    Family History:  Family History   Problem Relation Age of Onset     Cancer Mother         skin cancer     Skin Cancer Mother      Melanoma No family hx of        Medications:  Current Outpatient Medications   Medication Sig Dispense Refill     Bexarotene (TARGRETIN) 1 % GEL APPLY EVERY OTHER NIGHT TO EVERY NIGHT AS DIRECTED 60 g 5     Cholecalciferol (VITAMIN D-3 PO) Take 1 tablet by mouth daily.       Coenzyme Q10 (CO Q-10 PO) Take 1 tablet by mouth daily.       KRILL OIL PO Take  by mouth daily.       Multiple Vitamin (MULTIVITAMINS PO) Take  by mouth daily.       Probiotic Product (SOLUBLE FIBER/PROBIOTICS PO) Take 2 tablets by mouth daily.       triamcinolone (KENALOG) 0.1 % cream Use in morning to rash 454 g 5        Allergies   Allergen Reactions     Nkda [No Known Drug Allergies]          Review of Systems:  -As per HPI  -Constitutional: Otherwise feeling well today, in usual state of health. No fevers chills night sweats; stable weight.  -HEME/Lymph: no LAD  -HEENT: Patient denies nonhealing oral sores.  -Skin: As above in HPI. No additional skin concerns.    Physical exam:  GEN: This is a well developed, well-nourished female in no acute distress, in a pleasant mood.    SKIN: Full skin, which includes the head/face, both arms, chest, back, abdomen,both legs, genitalia and/or groin buttocks, digits and/or nails, was examined.  -Regalado skin type: II-III  -Atrophic a mildly indurated plaque on right posterior shoulder with slight overlying scale  -Scaly annular plaques on  the left superior back and shoulder  -Single indurrated and erythematous papule on the left posterior shoulder  -Waxy stuck on appearing papules with oily scale x 2 on the scalp  -Uniform appearing pigmented macule with true pigment network and w/o concerning features on dermoscopy  -There are dome shaped bright red papules on the trunk.   -Multiple regular brown pigmented macules and papules are identified on the head, trunk and extremities.   -There are fine lines and dyspigmentation on sun exposed areas of the face and chest.  -Scattered brown macules on sun exposed areas.  -There are waxy stuck on tan to brown papules on the trunk and extremities .  -No cervical, supraclavicular, axillary or inguinal LAD  -No other lesions of concern on areas examined.     Impression/Plan:  1. CTCL: excellent control at present off UVA1; will continue present topical regiment with targretin spot treatment.    Continue Targretin 1% gel (added on 5/23/16) with good response    Continue triamcinolone 0.1% cream as needed for irritation from targretin gel    Continue to defer phototherapy and lab evaluation at present - should patient experience greater than 5 new lesion or any tumors, will expedite follow-up, but ok to space to 6 mo visits    2. 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 TBSE 9/17/19 reassuring.    ABCDs of melanoma were discussed and self skin checks were advised.     Sun precaution was advised including the use of sun screens of SPF 30 or higher, sun protective clothing, and avoidance of tanning beds.    Will continue TBSE with all follow-up visit given increased risk from above    3. Benign lesions (benign pigmented nevi, cherry angiomata, seborrheic keratoses)    ABCDs of melanoma were discussed and self skin checks were advised.     Reassurance provided and benign nature of condition discussed    Follow-up in 6 months, earlier for new or changing lesions.         staffed the patient.    Staff Involved:  Resident(Ravin)/Staff(above)  I, Arlen Guillory MD, saw this patient with the resident and agree with the resident s findings and plan of care as documented in the resident s note.      Again, thank you for allowing me to participate in the care of your patient.      Sincerely,    Arlen Guillory MD

## 2019-11-25 ENCOUNTER — TELEPHONE (OUTPATIENT)
Dept: DERMATOLOGY | Facility: CLINIC | Age: 69
End: 2019-11-25

## 2019-11-26 NOTE — TELEPHONE ENCOUNTER
Rec'd mailed request to renew pt's approval into free drug for Targretin gel for 2020 through [facility]. Will work with pt and provider to complete/ submit.    [email or web address]

## 2019-12-03 NOTE — TELEPHONE ENCOUNTER
Pt portion emailed to pt to complete/ submit.    Jimmy signed by provider - incomplete jimmy faxed to [facility] via fax# [phone].

## 2019-12-30 NOTE — TELEPHONE ENCOUNTER
Pt and provider rec'd letter stating missing info. Calling [facility] at [phone] opt 2, 4, 1 to clarify as provider portion was submitted via fax on 12/3. Spoke to rep Kortney. Rep states she can confirm seeing provider portion which is signed and has correct info on it from 12/3 as previously noted. Jimmy is complete and will continue processing as explained in letter. Decision to be obtained in February or March.

## 2020-03-02 ENCOUNTER — HEALTH MAINTENANCE LETTER (OUTPATIENT)
Age: 70
End: 2020-03-02

## 2020-03-24 ENCOUNTER — TELEPHONE (OUTPATIENT)
Dept: DERMATOLOGY | Facility: CLINIC | Age: 70
End: 2020-03-24

## 2020-03-24 NOTE — TELEPHONE ENCOUNTER
Called and spoke with patient. Patient verbalized understanding of changing appointment to a telephone visit, same time/date.

## 2020-03-31 ENCOUNTER — VIRTUAL VISIT (OUTPATIENT)
Dept: DERMATOLOGY | Facility: CLINIC | Age: 70
End: 2020-03-31
Payer: COMMERCIAL

## 2020-03-31 DIAGNOSIS — C84.A0 CUTANEOUS T-CELL LYMPHOMA, UNSPECIFIED BODY REGION (H): Primary | ICD-10-CM

## 2020-03-31 ASSESSMENT — PAIN SCALES - GENERAL: PAINLEVEL: NO PAIN (0)

## 2020-03-31 NOTE — PROGRESS NOTES
Dermatology Rooming Note    Lyndsey Hagen's goals for this visit include:   Chief Complaint   Patient presents with     Derm Problem     Lyndsey is following up for her ctcl, states no concerns. Had new areas that developed in November/December, but they went away.         Rhonda Cerrato LPN

## 2020-03-31 NOTE — PROGRESS NOTES
"Lyndsey Hagen is a 69 year old female who is being evaluated via a billable telephone visit.      The patient has been notified of following:     \"This telephone visit will be conducted via a call between you and your physician/provider. We have found that certain health care needs can be provided without the need for a physical exam.  This service lets us provide the care you need with a short phone conversation.  If a prescription is necessary we can send it directly to your pharmacy.  If lab work is needed we can place an order for that and you can then stop by our lab to have the test done at a later time.    If during the course of the call the physician/provider feels a telephone visit is not appropriate, you will not be charged for this service.\"     Patient has given verbal consent for Telephone visit?  Yes    Dermatology Problem List   1. CTCL, stage IIB  - Previously treated with PUVA with good response. Treated at [place] prior to establishing care here. Stopped due to difficulty percuring oxsoralen and risks associated with PUVA.  Restarted on UVA1 with clinical remission.  Discontinue UVA1 3/12/2018  -Temporarily discontinued UVA1 (4/26/17) due to insurance  -Targretin 1% gel (added on 5/23/16) with good response.   -Triamcinolone 0.1% cream as needed for irritation from targretin gel.  2. Lesion to monitor, left superior parietal scalp Photo 8/27/2018  3. 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 Had new areas that developed in November/December, but they went away.         I have reviewed and updated the patient's Past Medical History, Social History, Family History and Medication List.    ALLERGIES  Nkda [no known drug allergies]    Additional provider notes:   68 yo female with CTCL stage IIB.  Doing very well with no current lesions.  Did have active areas of left arm in " November.  She used her Targretin gel and they resolved after one month.  No bleeding or tender lesions.  No current adenopathy per patient.  She did have a URI during a trip to [place] in early January.  It resolved.  She traveled to [place] in early March and again had an URI.  She saw a MD in [place] and was treated with a 10 day course of antibiotics for bronchitis and a sinus infection.  She did have adenopathy at that time but all symptoms have now resolved. No night sweats, fatigue or weight loss.  She is self isolating during the pandemic.  She would like to address brown spots of the face at our next visit.  She would also like to address her thinning hair which has slowly progressed over time.      Assessment/Plan:  1. CTCL stage IIB- currently in clinical remission.    Targretin gel daily as needed  Triamcinolone 0.1% cream as needed    2. Androgenetic alopecia  Recommended either Minoxidil 5% foam or Laser hair comb    Phone call duration: 23 minutes    Arlen Guillory MD

## 2020-03-31 NOTE — PATIENT INSTRUCTIONS
[facility] Teledermatology Visit    Thank you for allowing us to participate in your care. Your findings are consistent with CTCL. Your instructions are as follows:  Targretin gel daily as needed  Triamcinolone 0.1% cream as needed    Can use Minoxidil 5% foam or laser hair comb for hair loss-See below  Your follow-up instructions are as follows:  1. Follow-up with dermatology 3 months                When should I call my doctor?    If you are worsening or not improving, please, contact us or seek urgent care as noted below.     Who should I call with questions?    [facility]: [phone]     [facility]: [phone]    If this is a medical emergency and you are unable to reach an ER, Call 911        Low Level Laser (Light) therapy  Price: $195-$791  Contact Info: [email or web address]  [phone] or [phone]

## 2020-06-29 ENCOUNTER — VIRTUAL VISIT (OUTPATIENT)
Dept: DERMATOLOGY | Facility: CLINIC | Age: 70
End: 2020-06-29
Payer: COMMERCIAL

## 2020-06-29 DIAGNOSIS — L64.9 ANDROGENIC ALOPECIA: ICD-10-CM

## 2020-06-29 DIAGNOSIS — C84.A0 CUTANEOUS T-CELL LYMPHOMA, UNSPECIFIED BODY REGION (H): Primary | ICD-10-CM

## 2020-06-29 ASSESSMENT — PAIN SCALES - GENERAL: PAINLEVEL: NO PAIN (0)

## 2020-06-29 NOTE — PATIENT INSTRUCTIONS
[facility] Teledermatology Visit    Thank you for allowing us to participate in your care. Your findings, instructions and follow-up plan are as follows:    Continue Targretin once daily.   Start using triamcinolone once daily.    Continue laser hair comb three times weekly.    When should I call my doctor?    If you are worsening or not improving, please, contact us or seek urgent care as noted below.     Who should I call with questions (adults)?    [facility] (adult and pediatric): [phone]     [facility] (adult): [phone]    For urgent needs outside of business hours call the [facility] at [phone] and ask for the dermatology resident on call    If this is a medical emergency and you are unable to reach an ER, Call [phone]      Who should I call with questions (pediatric)?  [facility]- Pediatric Dermatology  Dr. Stefani Cota, Dr. Cheko Felton, Dr. Tamiko Merlos, Jia Dempsey, PA  Dr. Alana Leone, Dr. Anu Vincent & Dr. Leopoldo Richardson  Non Urgent  Nurse Triage Line; [phone]- Judy and Tosha SPRAGUE Care Coordinators   [place] (/Complex ) [phone]    If you need a prescription refill, please contact your pharmacy. Refills are approved or denied by our Physicians during normal business hours, Monday through Fridays  Per office policy, refills will not be granted if you have not been seen within the past year (or sooner depending on your child's condition)    Scheduling Information:  Pediatric Appointment Scheduling and Call Center [phone]  Radiology Scheduling- [phone]  Sedation Unit Scheduling- [phone]  [place] Scheduling- General [phone]; Pediatric Dermatology [phone]  Main  Services: [phone]  [place] Islander: [phone]  [place]: [phone]  Hmong/Denzel/Taiwanese: [phone]  Preadmission Nursing Department  Fax Number: [phone] (Fax all pre-operative paperwork to this number)    For urgent matters arising during evenings, weekends, or holidays that cannot wait for normal business hours please call [phone] and ask for the Dermatology Resident On-Call to be paged.

## 2020-06-29 NOTE — PROGRESS NOTES
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   -Start using triamcinolone 0.1% ointment once daily.    2. Androgenetic alopecia. Has been using laser hair comb for about 9-10 weeks now. Discussed that we expect it to take at least 6 months before seeing results. Can consider addition of topical 5% minoxidil.  -Continue laser hair comb 3x weekly    Follow-up:   Follow-up with dermatology in approximately 12 weeks. Earlier for new or changing lesions or rash.      Staff and resident:    Dr. Guillory staffed this patient.    Wilner Julian MD  Dermatology Resident, PGY-2  I, Arlen Guillory,  was with the resident on the (phone/video) visit (for the critical and key portions or for 16 minutes) and agree with the resident's findings and plan of care as documented in the resident's note  ____________________________________________________________________    Dermatology Problem List:   1. CTCL, stage IIB  - Previously treated with PUVA with good response. Treated at [place] prior to establishing care here. Stopped due to difficulty percuring oxsoralen and risks associated with PUVA.  Restarted on UVA1 with clinical remission.  Discontinue UVA1 3/12/2018  -Temporarily discontinued UVA1 (4/26/17) due to insurance  -Targretin 1% gel (added on 5/23/16) with good response.   -Triamcinolone 0.1% cream as needed for irritation from targretin gel.  2. Lesion to monitor, left superior parietal scalp Photo 8/27/2018  3. 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 Androgenic alopecia- LLLT 3x/week    Encounter Date: Jun 29, 2020    CC:   Chief Complaint   Patient presents with     Derm Problem     Lyndsey is following up for ctcl, states she has a little patch on her shoulder.        History of Present Illness:  I have reviewed the  teledermatology information and the nursing intake corresponding to this issue. Lyndsey Hagen is a 69 year old female who presents via teledermatology for follow-up CTCL. We last saw her virtually 3/31/20 at which time we continued Targretin gel daily as needed and Triamcinolone 0.1% cream as needed. She states that she is doing great and only has one plaque on her left back. She thinks that it actually looks better than photo because it was sun burned in photo and she thinks she was using too much Targretin. She was using Targretin BID, but recently decreased to once daily. She is not using the triamcinolone. She otherwise is doing well and has no other concerns.       ROS: Patient is generally feeling well today. Denies night sweats, fevers, chills, weight loss.    Physical Examination:  General: Well-appearing female, appropriately-developed individual.  Skin: Focused examination within the teledermatology photograph(s) including back, left arm and scalp was performed.   - Left upper back with erythematous indurated appearing plaque  - Hypopigmented patches x 3 noted on upper back  - Diffuse thinning of hair on entire scalp    Labs:  Reviewed    Past Medical History:   Patient Active Problem List   Diagnosis     Cutaneous T-cell lymphoma (H)     Past Medical History:   Diagnosis Date     Cutaneous T-cell lymphoma (H)      Past Surgical History:   Procedure Laterality Date     NO HISTORY OF SURGERY  10/28/31    derm       Social History:  Patient reports that she has quit smoking. Her smoking use included cigarettes. She has a 12.00 pack-year smoking history. She has never used smokeless tobacco.    Family History:  Family History   Problem Relation Age of Onset     Cancer Mother         skin cancer     Skin Cancer Mother      Melanoma No family hx of        Medications:  Current Outpatient Medications   Medication     Bexarotene (TARGRETIN) 1 % GEL     Cholecalciferol (VITAMIN D-3 PO)     Coenzyme Q10 (CO Q-10 PO)      KRILL OIL PO     Multiple Vitamin (MULTIVITAMINS PO)     Probiotic Product (SOLUBLE FIBER/PROBIOTICS PO)     triamcinolone (KENALOG) 0.1 % cream     Current Facility-Administered Medications   Medication     lidocaine 1% with EPINEPHrine 1:100,000 injection 3 mL          Allergies   Allergen Reactions     Nkda [No Known Drug Allergies]      _____________________________________________________________________________    Teledermatology information:  - Location of patient: [place]  - Patient presented as: return  - Location of teledermatologist:  [facility] DERMATOLOGY )  - Reason teledermatology is appropriate:  of National Emergency Regarding Coronavirus disease (COVID 19) Outbreak  - Image quality and interpretability: acceptable  - Physician has received verbal consent for a Video/Photos Visit from the patient? Yes  - In-person dermatology visit recommendation: no  - Date of images: 6/22/2020  - Service start time:11:38 AM   - Service end time: 11:44 AM  - Date of report: 6/29/2020

## 2020-06-29 NOTE — LETTER
6/29/2020       RE: Lyndsey Hagen  [address]  [place] [address]     Dear Colleague,    Thank you for referring your patient, Lyndsey Hagen, to the [facility] DERMATOLOGY at [facility]. Please see a copy of my visit note below.    [facility]  Store and Forward and Telephone [phone]       Impression and Recommendations (Patient Counseled on the Following):    1. CTCL stage IIB- currently minimal disease   -Continue Targretin (bexarotene) 1% gel once daily.   -Start using triamcinolone 0.1% ointment once daily.    2. Androgenetic alopecia. Has been using laser hair comb for about 9-10 weeks now. Discussed that we expect it to take at least 6 months before seeing results. Can consider addition of topical 5% minoxidil.  -Continue laser hair comb 3x weekly    Follow-up:   Follow-up with dermatology in approximately 12 weeks. Earlier for new or changing lesions or rash.      Staff and resident:    Dr. Guillory staffed this patient.    Wilner Julian MD  Dermatology Resident, PGY-2  I, Arlen Guillory,  was with the resident on the (phone/video) visit (for the critical and key portions or for 16 minutes) and agree with the resident's findings and plan of care as documented in the resident's note  ____________________________________________________________________    Dermatology Problem List:   1. CTCL, stage IIB  - Previously treated with PUVA with good response. Treated at [place] prior to establishing care here. Stopped due to difficulty percuring oxsoralen and risks associated with PUVA.  Restarted on UVA1 with clinical remission.  Discontinue UVA1 3/12/2018  -Temporarily discontinued UVA1 (4/26/17) due to insurance  -Targretin 1% gel (added on 5/23/16) with good response.   -Triamcinolone 0.1% cream as needed for irritation from targretin gel.  2. Lesion to monitor, left superior parietal scalp Photo 8/27/2018  3. 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  Androgenic alopecia- LLLT 3x/week    Encounter Date: Jun 29, 2020    CC:   Chief Complaint   Patient presents with     Derm Problem     Lyndsey is following up for ctcl, states she has a little patch on her shoulder.        History of Present Illness:  I have reviewed the teledermatology information and the nursing intake corresponding to this issue. Lyndsey Hagen is a 69 year old female who presents via teledermatology for follow-up CTCL. We last saw her virtually 3/31/20 at which time we continued Targretin gel daily as needed and Triamcinolone 0.1% cream as needed. She states that she is doing great and only has one plaque on her left back. She thinks that it actually looks better than photo because it was sun burned in photo and she thinks she was using too much Targretin. She was using Targretin BID, but recently decreased to once daily. She is not using the triamcinolone. She otherwise is doing well and has no other concerns.       ROS: Patient is generally feeling well today. Denies night sweats, fevers, chills, weight loss.    Physical Examination:  General: Well-appearing female, appropriately-developed individual.  Skin: Focused examination within the teledermatology photograph(s) including back, left arm and scalp was performed.   - Left upper back with erythematous indurated appearing plaque  - Hypopigmented patches x 3 noted on upper back  - Diffuse thinning of hair on entire scalp    Labs:  Reviewed    Past Medical History:   Patient Active Problem List   Diagnosis     Cutaneous T-cell lymphoma (H)     Past Medical History:   Diagnosis Date     Cutaneous T-cell lymphoma (H)      Past Surgical History:   Procedure Laterality Date     NO HISTORY OF SURGERY  10/28/31    derm       Social History:  Patient reports that she has quit smoking. Her smoking use included cigarettes. She has a 12.00 pack-year smoking history. She has never used smokeless tobacco.    Family History:  Family History   Problem Relation Age  of Onset     Cancer Mother         skin cancer     Skin Cancer Mother      Melanoma No family hx of        Medications:  Current Outpatient Medications   Medication     Bexarotene (TARGRETIN) 1 % GEL     Cholecalciferol (VITAMIN D-3 PO)     Coenzyme Q10 (CO Q-10 PO)     KRILL OIL PO     Multiple Vitamin (MULTIVITAMINS PO)     Probiotic Product (SOLUBLE FIBER/PROBIOTICS PO)     triamcinolone (KENALOG) 0.1 % cream     Current Facility-Administered Medications   Medication     lidocaine 1% with EPINEPHrine 1:100,000 injection 3 mL          Allergies   Allergen Reactions     Nkda [No Known Drug Allergies]      _____________________________________________________________________________    Teledermatology information:  - Location of patient: [place]  - Patient presented as: return  - Location of teledermatologist:  [facility] DERMATOLOGY )  - Reason teledermatology is appropriate:  of National Emergency Regarding Coronavirus disease (COVID 19) Outbreak  - Image quality and interpretability: acceptable  - Physician has received verbal consent for a Video/Photos Visit from the patient? Yes  - In-person dermatology visit recommendation: no  - Date of images: 6/22/2020  - Service start time:11:38 AM   - Service end time: 11:44 AM  - Date of report: 6/29/2020       Again, thank you for allowing me to participate in the care of your patient.      Sincerely,    Arlen Guillory MD

## 2020-06-29 NOTE — NURSING NOTE
Dermatology Rooming Note    Lyndsey Hagen's goals for this visit include:   Chief Complaint   Patient presents with     Derm Problem     Lyndsey is following up for ctcl, states she has a little patch on her shoulder.        Rhonda Cerrato LPN

## 2020-07-13 ENCOUNTER — OFFICE VISIT - HEALTHEAST (OUTPATIENT)
Dept: INTERNAL MEDICINE | Facility: CLINIC | Age: 70
End: 2020-07-13

## 2020-07-13 DIAGNOSIS — Z00.00 WELLNESS EXAMINATION: ICD-10-CM

## 2020-07-13 DIAGNOSIS — Z11.59 NEED FOR HEPATITIS C SCREENING TEST: ICD-10-CM

## 2020-07-13 DIAGNOSIS — C84.A0 CUTANEOUS T-CELL LYMPHOMA, UNSPECIFIED BODY REGION (H): ICD-10-CM

## 2020-07-13 DIAGNOSIS — E78.5 HYPERLIPIDEMIA LDL GOAL <130: ICD-10-CM

## 2020-07-13 DIAGNOSIS — R05.9 COUGH: ICD-10-CM

## 2020-07-13 DIAGNOSIS — E55.9 VITAMIN D DEFICIENCY: ICD-10-CM

## 2020-07-13 DIAGNOSIS — Z12.31 VISIT FOR SCREENING MAMMOGRAM: ICD-10-CM

## 2020-07-13 LAB
ALBUMIN SERPL-MCNC: 3.9 G/DL (ref 3.5–5)
ALP SERPL-CCNC: 57 U/L (ref 45–120)
ALT SERPL W P-5'-P-CCNC: 23 U/L (ref 0–45)
ANION GAP SERPL CALCULATED.3IONS-SCNC: 9 MMOL/L (ref 5–18)
AST SERPL W P-5'-P-CCNC: 21 U/L (ref 0–40)
BILIRUB SERPL-MCNC: 0.5 MG/DL (ref 0–1)
BUN SERPL-MCNC: 17 MG/DL (ref 8–22)
CALCIUM SERPL-MCNC: 9.4 MG/DL (ref 8.5–10.5)
CHLORIDE BLD-SCNC: 106 MMOL/L (ref 98–107)
CHOLEST SERPL-MCNC: 268 MG/DL
CO2 SERPL-SCNC: 26 MMOL/L (ref 22–31)
CREAT SERPL-MCNC: 0.83 MG/DL (ref 0.6–1.1)
ERYTHROCYTE [DISTWIDTH] IN BLOOD BY AUTOMATED COUNT: 11.4 % (ref 11–14.5)
FASTING STATUS PATIENT QL REPORTED: YES
GFR SERPL CREATININE-BSD FRML MDRD: >60 ML/MIN/1.73M2
GLUCOSE BLD-MCNC: 100 MG/DL (ref 70–125)
HCT VFR BLD AUTO: 46.1 % (ref 35–47)
HDLC SERPL-MCNC: 63 MG/DL
HGB BLD-MCNC: 15.6 G/DL (ref 12–16)
LDLC SERPL CALC-MCNC: 185 MG/DL
MCH RBC QN AUTO: 33.4 PG (ref 27–34)
MCHC RBC AUTO-ENTMCNC: 33.7 G/DL (ref 32–36)
MCV RBC AUTO: 99 FL (ref 80–100)
PLATELET # BLD AUTO: 286 THOU/UL (ref 140–440)
PMV BLD AUTO: 6.8 FL (ref 7–10)
POTASSIUM BLD-SCNC: 4.7 MMOL/L (ref 3.5–5)
PROT SERPL-MCNC: 6.6 G/DL (ref 6–8)
RBC # BLD AUTO: 4.66 MILL/UL (ref 3.8–5.4)
SODIUM SERPL-SCNC: 141 MMOL/L (ref 136–145)
TRIGL SERPL-MCNC: 98 MG/DL
TSH SERPL DL<=0.005 MIU/L-ACNC: 1.76 UIU/ML (ref 0.3–5)
WBC: 6.6 THOU/UL (ref 4–11)

## 2020-07-13 ASSESSMENT — MIFFLIN-ST. JEOR: SCORE: 1312.2

## 2020-07-14 LAB
25(OH)D3 SERPL-MCNC: 99.5 NG/ML (ref 30–80)
25(OH)D3 SERPL-MCNC: 99.5 NG/ML (ref 30–80)
HCV AB SERPL QL IA: NEGATIVE

## 2020-07-15 ENCOUNTER — COMMUNICATION - HEALTHEAST (OUTPATIENT)
Dept: INTERNAL MEDICINE | Facility: CLINIC | Age: 70
End: 2020-07-15

## 2020-07-15 DIAGNOSIS — E78.5 HYPERLIPIDEMIA LDL GOAL <130: ICD-10-CM

## 2020-07-18 ENCOUNTER — COMMUNICATION - HEALTHEAST (OUTPATIENT)
Dept: SCHEDULING | Facility: CLINIC | Age: 70
End: 2020-07-18

## 2020-10-05 ENCOUNTER — VIRTUAL VISIT (OUTPATIENT)
Dept: DERMATOLOGY | Facility: CLINIC | Age: 70
End: 2020-10-05
Payer: COMMERCIAL

## 2020-10-05 DIAGNOSIS — L64.9 ANDROGENIC ALOPECIA: ICD-10-CM

## 2020-10-05 DIAGNOSIS — C84.A0 CUTANEOUS T-CELL LYMPHOMA, UNSPECIFIED BODY REGION (H): Primary | ICD-10-CM

## 2020-10-05 PROCEDURE — 99441 PR PHYSICIAN TELEPHONE EVALUATION 5-10 MIN: CPT | Mod: 95 | Performed by: DERMATOLOGY

## 2020-10-05 NOTE — LETTER
10/5/2020       RE: Lyndsey Hagen  [address]  [place] [address]     Dear Colleague,    Thank you for referring your patient, Lyndsey Hagen, to the [facility] DERMATOLOGY CLINIC [place] at [facility]. Please see a copy of my visit note below.    Dermatology Phone Visit: Phone Number:[phone]    Dermatology Problem List:   1. CTCL, stage IIB  - Previously treated with PUVA with good response. Treated at [place] prior to establishing care here. Stopped due to difficulty percuring oxsoralen and risks associated with PUVA.  Restarted on UVA1 with clinical remission.  Discontinue UVA1 3/12/2018  -Temporarily discontinued UVA1 (4/26/17) due to insurance  -Targretin 1% gel (added on 5/23/16) with good response.   -Triamcinolone 0.1% cream as needed for irritation from targretin gel.  2. Lesion to monitor, left superior parietal scalp Photo 8/27/2018  3. 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 Androgenic alopecia- LLLT 3x/week    Patient opted to conduct today's return visit via telephone vs an in person visit to the clinic.    Encounter Date: Oct 5, 2020    Chief complaint:   No chief complaint on file.      I spoke with: Lyndsey Hagen    The reason for the telephone visit was:   Follow up CTCL and androgenetic alopecia    Pertinent history and review of systems:  She is doing well and getting out to relax.  Does get small patches/papules of CTCL which come and go.  She uses her Targretin gel and they resolve within 6 to 8 weeks.  No fever, chills, night sweats, or weight loss.  Per patient, no palpable lymph nodes.  No tender or bleeding lesions.  No areas of concern for skin cancer.  With regards to her hair loss, she is using her laser hair comb and her  notes new hair growth for which she is pleased.   Assessment/Advice/instructions given to patient/guardian including prescriptions, follow up appointment or orders for diagnostic testing:  -CTCL stage IIB- doing  well.   Continue targretin gel to all new lesions  -Androgenetic alopecia  Continue laser hair comb    RTC in  3 months    Phone call contact time:  Call Started at: 11:27 am  Call Ended at: 11:37 am    Staff only:    Arlen Guillory MD

## 2020-10-05 NOTE — PATIENT INSTRUCTIONS
[facility] Dermatology Visit    Thank you for allowing us to participate in your care. Your findings, instructions and follow-up plan are as follows:  1. Cutaneous T cell lymphoma- continues to do well. Use your Targretin (bexarotene) gel as needed.  2. Hair loss-  Good sign to see the small hairs growing.  Continue the laser hair comb    When should I call my doctor?    If you are worsening or not improving, please, contact us or seek urgent care as noted below.     Who should I call with questions (adults)?    [facility] (adult and pediatric): [phone]     [facility] (adult): [phone]    For urgent needs outside of business hours call the [facility] at [phone] and ask for the dermatology resident on call    If this is a medical emergency and you are unable to reach an ER, Call [phone]      Who should I call with questions (pediatric)?  [facility]- Pediatric Dermatology  Dr. Stefani Cota, Dr. Cheko Felton, Dr. Tamiko Merlos, Jia Dempsey, PA  Dr. Alana Leone, Dr. Anu Vincent & Dr. Leopoldo Richardson  Non Urgent  Nurse Triage Line; [phone]- Judy and Tosha SPRAGUE Care Coordinators   Dior (/Complex ) [phone]    If you need a prescription refill, please contact your pharmacy. Refills are approved or denied by our Physicians during normal business hours, Monday through Fridays  Per office policy, refills will not be granted if you have not been seen within the past year (or sooner depending on your child's condition)    Scheduling Information:  Pediatric Appointment Scheduling and Call Center [phone]  Radiology Scheduling- [phone]  Sedation Unit Scheduling- [phone]  [place] Scheduling- General [phone]; Pediatric Dermatology [phone]  Main  Services: [phone]  Slovak: [phone]  Jr:  [phone]  Zuri/Denzel/Omani: [phone]  Preadmission Nursing Department Fax Number: [phone] (Fax all pre-operative paperwork to this number)    For urgent matters arising during evenings, weekends, or holidays that cannot wait for normal business hours please call [phone] and ask for the Dermatology Resident On-Call to be paged.

## 2020-10-05 NOTE — PROGRESS NOTES
Dermatology Phone Visit: Phone Number:[phone]    Dermatology Problem List:   1. CTCL, stage IIB  - Previously treated with PUVA with good response. Treated at [place] prior to establishing care here. Stopped due to difficulty percuring oxsoralen and risks associated with PUVA.  Restarted on UVA1 with clinical remission.  Discontinue UVA1 3/12/2018  -Temporarily discontinued UVA1 (4/26/17) due to insurance  -Targretin 1% gel (added on 5/23/16) with good response.   -Triamcinolone 0.1% cream as needed for irritation from targretin gel.  2. Lesion to monitor, left superior parietal scalp Photo 8/27/2018  3. 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 Androgenic alopecia- LLLT 3x/week    Patient opted to conduct today's return visit via telephone vs an in person visit to the clinic.    Encounter Date: Oct 5, 2020    Chief complaint:   No chief complaint on file.      I spoke with: Lyndsey Hagen    The reason for the telephone visit was:   Follow up CTCL and androgenetic alopecia    Pertinent history and review of systems:  She is doing well and getting out to relax.  Does get small patches/papules of CTCL which come and go.  She uses her Targretin gel and they resolve within 6 to 8 weeks.  No fever, chills, night sweats, or weight loss.  Per patient, no palpable lymph nodes.  No tender or bleeding lesions.  No areas of concern for skin cancer.  With regards to her hair loss, she is using her laser hair comb and her  notes new hair growth for which she is pleased.   Assessment/Advice/instructions given to patient/guardian including prescriptions, follow up appointment or orders for diagnostic testing:  -CTCL stage IIB- doing well.   Continue targretin gel to all new lesions  -Androgenetic alopecia  Continue laser hair comb    RTC in  3 months    Phone call contact time:  Call Started at: 11:27 am  Call Ended at: 11:37 am    Staff only:    Arlen Guillory MD

## 2020-11-24 DIAGNOSIS — Z12.31 VISIT FOR SCREENING MAMMOGRAM: ICD-10-CM

## 2020-11-24 PROCEDURE — 77067 SCR MAMMO BI INCL CAD: CPT | Mod: GC | Performed by: STUDENT IN AN ORGANIZED HEALTH CARE EDUCATION/TRAINING PROGRAM

## 2020-11-24 PROCEDURE — 77063 BREAST TOMOSYNTHESIS BI: CPT | Mod: GC | Performed by: STUDENT IN AN ORGANIZED HEALTH CARE EDUCATION/TRAINING PROGRAM

## 2020-12-01 ENCOUNTER — TELEPHONE (OUTPATIENT)
Dept: DERMATOLOGY | Facility: CLINIC | Age: 70
End: 2020-12-01

## 2020-12-01 NOTE — TELEPHONE ENCOUNTER
Free Drug Application Initiated  Medication: Targretin gel  [facility][note 3]  Phone # [phone]  Fax # [phone]  Additional Information : Faxed in completed pt portion and MyC sent to verify if pt sent in hers already, will be following up on these

## 2020-12-20 ENCOUNTER — HEALTH MAINTENANCE LETTER (OUTPATIENT)
Age: 70
End: 2020-12-20

## 2021-01-04 ENCOUNTER — OFFICE VISIT (OUTPATIENT)
Dept: DERMATOLOGY | Facility: CLINIC | Age: 71
End: 2021-01-04
Payer: COMMERCIAL

## 2021-01-04 DIAGNOSIS — D22.9 MULTIPLE BENIGN NEVI: ICD-10-CM

## 2021-01-04 DIAGNOSIS — L64.9 ANDROGENIC ALOPECIA: ICD-10-CM

## 2021-01-04 DIAGNOSIS — C84.A0 CUTANEOUS T-CELL LYMPHOMA, UNSPECIFIED BODY REGION (H): Primary | ICD-10-CM

## 2021-01-04 DIAGNOSIS — Z85.828 HX OF SQUAMOUS CELL CARCINOMA OF SKIN: ICD-10-CM

## 2021-01-04 PROCEDURE — 99214 OFFICE O/P EST MOD 30 MIN: CPT | Mod: GC | Performed by: DERMATOLOGY

## 2021-01-04 ASSESSMENT — PAIN SCALES - GENERAL: PAINLEVEL: NO PAIN (0)

## 2021-01-04 NOTE — NURSING NOTE
Dermatology Rooming Note    Lyndsey Hagen's goals for this visit include:   Chief Complaint   Patient presents with     Derm Problem     Lyndsey is here today for a 3 month t-cell follow up      SELENE Carter

## 2021-01-04 NOTE — PROGRESS NOTES
[facility] Dermatology Note      Dermatology Problem List:   1. CTCL, stage IIB  - Previously treated with PUVA with good response. Treated at [place] prior to establishing care here. Stopped due to difficulty percuring oxsoralen and risks associated with PUVA.  Restarted on UVA1 with clinical remission.  Discontinue UVA1 3/12/2018  -Temporarily discontinued UVA1 (4/26/17) due to insurance  -Targretin 1% gel (added on 5/23/16) with good response.   -Triamcinolone 0.1% cream as needed for irritation from targretin gel.  2. Lesion to monitor, left superior parietal scalp Photo 8/27/2018  3. 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 Androgenic alopecia- LLLT 3x/week    Encounter Date: Jan 4, 2021    CC:   Chief Complaint   Patient presents with     Derm Problem     Lyndsey is here today for a 3 month t-cell follow up        History of Present Illness:  Ms. Lyndsey Hagen is a 70 year old female who presents as a follow-up for CTCL. The patient was last seen 10/5/20 virtually and was doing well at that time. Continues to get small patches/papules of CTCL which come and go. Says it has been a while since she last had a patch. She uses her Targretin gel once a day on these spots and the spots go away after about 3 weeks. Denies fever, chills, night sweats, or weight loss.    Denies any tender or bleeding lesions.  Has noticed a few little spots on the upper back that she wants looked at.   With regards to her hair loss, she is using her laser hair comb three times per week and is happy with the results.    Past Medical History:   Patient Active Problem List   Diagnosis     Cutaneous T-cell lymphoma (H)     Past Medical History:   Diagnosis Date     Cutaneous T-cell lymphoma (H)      Past Surgical History:   Procedure Laterality Date     NO HISTORY OF SURGERY  10/28/31    derm       Social History:  Patient reports that she has quit smoking. Her smoking use included cigarettes. She has a 12.00 pack-year smoking history.  She has never used smokeless tobacco.    Family History:  Family History   Problem Relation Age of Onset     Cancer Mother         skin cancer     Skin Cancer Mother      Melanoma No family hx of        Medications:  Current Outpatient Medications   Medication Sig Dispense Refill     Bexarotene (TARGRETIN) 1 % GEL APPLY EVERY OTHER NIGHT TO EVERY NIGHT AS DIRECTED 60 g 5     Cholecalciferol (VITAMIN D-3 PO) Take 1 tablet by mouth daily.       Coenzyme Q10 (CO Q-10 PO) Take 1 tablet by mouth daily.       Multiple Vitamin (MULTIVITAMINS PO) Take  by mouth daily.       Probiotic Product (SOLUBLE FIBER/PROBIOTICS PO) Take 2 tablets by mouth daily.       triamcinolone (KENALOG) 0.1 % cream Use in morning to rash 454 g 5     KRILL OIL PO Take  by mouth daily.          Allergies   Allergen Reactions     Nkda [No Known Drug Allergies]        Review of Systems:  - Denies fever, chills, night sweats, or weight loss  - Constitutional: Otherwise feeling well today, in usual state of health.  - Skin: As above in HPI. No additional skin concerns.    Physical exam:  Vitals: There were no vitals taken for this visit.  GEN: This is a well developed, well-nourished female in no acute distress, in a pleasant mood.     SKIN: Skin exam including the head/face, both arms, chest, back, abdomen,both legs, buttocks, digits and/or nails, was examined.  -[place] skin type: II  - On the trunk and extremities, there are many scattered tan/Brown waxy stuck on appearing papules and plaques  - On the trunk and extremities, there are many scattered light to dark brown 3-5 mm macules with uniform pigment network present under dermoscopy, consistent with benign melanocytic nevi  - On the trunk, extremities, and face, there are many scattered tan-to-light brown colored macules with matte appearance, consistent with solar lentigines.  - well-healed scar on R shin at site of prior SCC s/p MMS, no evidence of recurrence on palpation and visual  examination  Lymph: No cervical, supraclavicular, axillary, or inguinal lymphadenopathy.       Impression/Plan:  1. CTCL stage IIB - doing well.  Stable with no systemic symptoms.  Will continue to monitor.  - Continue targretin gel to all new lesions    2. Androgenetic alopecia, stable  - Continue laser hair comb    3. Seborrheic keratoses  - Reassured patient of the benign nature/appearance of this lesion(s). No treatment is necessary at this time unless the lesion(s) changes or becomes symptomatic.     4. Benign melanocytic nevi  - Reassured patient of the benign appearance of their nevi on today's exam. No treatment is necessary at this time unless the nevi change or become symptomatic.     5. Hx of NMSC  - NERD    Follow-up in 6 months, earlier for new or changing lesions.       Dr. Guillory staffed the patient.    Staff Involved:  Resident(Chay)/Staff  Priscila Cartwright MD  Dermatology Resident, PGY-2      I, Arlen Guillory MD, saw this patient with the resident and agree with the resident s findings and plan of care as documented in the resident s note.

## 2021-01-04 NOTE — PATIENT INSTRUCTIONS
We will see you in 6 months! Let us know if you have any new spots that are you concerned about before then and we can get you in sooner.

## 2021-01-04 NOTE — LETTER
1/4/2021       RE: Lyndsey Hagen  [address]  [facility] [address]     Dear Colleague,    Thank you for referring your patient, Lyndsey Hagen, to the [facility] [place] at [facility]. Please see a copy of my visit note below.    [facility] Dermatology Note      Dermatology Problem List:   1. CTCL, stage IIB  - Previously treated with PUVA with good response. Treated at [place] prior to establishing care here. Stopped due to difficulty percuring oxsoralen and risks associated with PUVA.  Restarted on UVA1 with clinical remission.  Discontinue UVA1 3/12/2018  -Temporarily discontinued UVA1 (4/26/17) due to insurance  -Targretin 1% gel (added on 5/23/16) with good response.   -Triamcinolone 0.1% cream as needed for irritation from targretin gel.  2. Lesion to monitor, left superior parietal scalp Photo 8/27/2018  3. 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 Androgenic alopecia- LLLT 3x/week    Encounter Date: Jan 4, 2021    CC:   Chief Complaint   Patient presents with     Derm Problem     Lyndsey is here today for a 3 month t-cell follow up        History of Present Illness:  Ms. Lyndsey Hagen is a 70 year old female who presents as a follow-up for CTCL. The patient was last seen 10/5/20 virtually and was doing well at that time. Continues to get small patches/papules of CTCL which come and go. Says it has been a while since she last had a patch. She uses her Targretin gel once a day on these spots and the spots go away after about 3 weeks. Denies fever, chills, night sweats, or weight loss.    Denies any tender or bleeding lesions.  Has noticed a few little spots on the upper back that she wants looked at.   With regards to her hair loss, she is using her laser hair comb three times per week and is happy with the results.    Past Medical History:   Patient Active Problem List   Diagnosis     Cutaneous T-cell lymphoma (H)     Past Medical  History:   Diagnosis Date     Cutaneous T-cell lymphoma (H)      Past Surgical History:   Procedure Laterality Date     NO HISTORY OF SURGERY  10/28/31    derm       Social History:  Patient reports that she has quit smoking. Her smoking use included cigarettes. She has a 12.00 pack-year smoking history. She has never used smokeless tobacco.    Family History:  Family History   Problem Relation Age of Onset     Cancer Mother         skin cancer     Skin Cancer Mother      Melanoma No family hx of        Medications:  Current Outpatient Medications   Medication Sig Dispense Refill     Bexarotene (TARGRETIN) 1 % GEL APPLY EVERY OTHER NIGHT TO EVERY NIGHT AS DIRECTED 60 g 5     Cholecalciferol (VITAMIN D-3 PO) Take 1 tablet by mouth daily.       Coenzyme Q10 (CO Q-10 PO) Take 1 tablet by mouth daily.       Multiple Vitamin (MULTIVITAMINS PO) Take  by mouth daily.       Probiotic Product (SOLUBLE FIBER/PROBIOTICS PO) Take 2 tablets by mouth daily.       triamcinolone (KENALOG) 0.1 % cream Use in morning to rash 454 g 5     KRILL OIL PO Take  by mouth daily.          Allergies   Allergen Reactions     Nkda [No Known Drug Allergies]        Review of Systems:  - Denies fever, chills, night sweats, or weight loss  - Constitutional: Otherwise feeling well today, in usual state of health.  - Skin: As above in HPI. No additional skin concerns.    Physical exam:  Vitals: There were no vitals taken for this visit.  GEN: This is a well developed, well-nourished female in no acute distress, in a pleasant mood.     SKIN: Skin exam including the head/face, both arms, chest, back, abdomen,both legs, buttocks, digits and/or nails, was examined.  -[place]alado skin type: II  - On the trunk and extremities, there are many scattered tan/Brown waxy stuck on appearing papules and plaques  - On the trunk and extremities, there are many scattered light to dark brown 3-5 mm macules with uniform pigment network present under dermoscopy,  consistent with benign melanocytic nevi  - On the trunk, extremities, and face, there are many scattered tan-to-light brown colored macules with matte appearance, consistent with solar lentigines.  - well-healed scar on R shin at site of prior SCC s/p MMS, no evidence of recurrence on palpation and visual examination  Lymph: No cervical, supraclavicular, axillary, or inguinal lymphadenopathy.       Impression/Plan:  1. CTCL stage IIB - doing well.  Stable with no systemic symptoms.  Will continue to monitor.  - Continue targretin gel to all new lesions    2. Androgenetic alopecia, stable  - Continue laser hair comb    3. Seborrheic keratoses  - Reassured patient of the benign nature/appearance of this lesion(s). No treatment is necessary at this time unless the lesion(s) changes or becomes symptomatic.     4. Benign melanocytic nevi  - Reassured patient of the benign appearance of their nevi on today's exam. No treatment is necessary at this time unless the nevi change or become symptomatic.     5. Hx of NMSC  - NERD    Follow-up in 6 months, earlier for new or changing lesions.       Dr. Guillory staffed the patient.    Staff Involved:  Resident(Chay)/Staff  Priscila Cartwright MD  Dermatology Resident, PGY-2      I, Arlen Guillory MD, saw this patient with the resident and agree with the resident s findings and plan of care as documented in the resident s note.

## 2021-01-20 NOTE — TELEPHONE ENCOUNTER
Prior Authorization Approval    Authorization Effective Date: 1/1/2021  Authorization Expiration Date: 1/20/2022  Medication: Targretin-[facility] Free Drug Renewal for 2021 - free drug denied  Approved Dose/Quantity:   Reference #: [id number]   Insurance Company: [employer] - Phone [phone] Fax [phone]  Expected CoPay: 7518.59     CoPay Card Available:      Foundation Assistance Needed:    Which Pharmacy is filling the prescription (Not needed for infusion/clinic administered):    Pharmacy Notified:    Patient Notified:

## 2021-01-20 NOTE — TELEPHONE ENCOUNTER
Per the automated system [facility] has denied free drug for the patietn with the reason that patient's insurance should cover.    PA Initiation    Medication: Targretin-mapp2link [facility] Free Drug Renewal for 2021 - free drug denied  Insurance Company: [employer] - Phone [phone] Fax [phone]  Pharmacy Filling the Rx:    Filling Pharmacy Phone:    Filling Pharmacy Fax:    Start Date: 1/20/2021

## 2021-01-25 NOTE — TELEPHONE ENCOUNTER
Free Drug Application Denied  Denial Reason(s): Does not meet criteria  Patient notified? Yes, Terry sent patient a letter  Additional Information

## 2021-03-08 ENCOUNTER — COMMUNICATION - HEALTHEAST (OUTPATIENT)
Dept: INTERNAL MEDICINE | Facility: CLINIC | Age: 71
End: 2021-03-08

## 2021-04-18 ENCOUNTER — HEALTH MAINTENANCE LETTER (OUTPATIENT)
Age: 71
End: 2021-04-18

## 2021-05-12 ENCOUNTER — RECORDS - HEALTHEAST (OUTPATIENT)
Dept: MAMMOGRAPHY | Facility: CLINIC | Age: 71
End: 2021-05-12

## 2021-05-12 DIAGNOSIS — Z12.31 ENCOUNTER FOR SCREENING MAMMOGRAM FOR MALIGNANT NEOPLASM OF BREAST: ICD-10-CM

## 2021-05-29 NOTE — TELEPHONE ENCOUNTER
New Appointment Needed  What is the reason for the visit:    Establish care physical  Provider Preference: Dr Ortiz, patient would like to schedule an Establish Care Physical   How soon do you need to be seen?: when available  Waitlist offered?: No  Okay to leave a detailed message:  Yes

## 2021-05-29 NOTE — TELEPHONE ENCOUNTER
Left pt detailed message that Dr. Ortiz is not accepting new patients.  Advised pt that Dr. Lucio is accepting new patients and [facility] has providers accepting new patients.  Advised pt to call back with further questions.

## 2021-05-30 NOTE — PATIENT INSTRUCTIONS - HE
Advance Directive  Patient s advance directive was discussed and I am comfortable with the patient s wishes.  Patient Education   Personalized Prevention Plan  You are due for the preventive services outlined below.  Your care team is available to assist you in scheduling these services.  If you have already completed any of these items, please share that information with your care team to update in your medical record.  Health Maintenance   Topic Date Due     ZOSTER VACCINES (1 of 2) 09/29/2002     PNEUMOCOCCAL POLYSACCHARIDE VACCINE AGE 65 AND OVER  09/29/2017     INFLUENZA VACCINE RULE BASED (1) 08/01/2019     MAMMOGRAM  12/15/2019     DXA SCAN  06/08/2020     FALL RISK ASSESSMENT  07/11/2020     ADVANCE DIRECTIVES DISCUSSED WITH PATIENT  12/19/2022     TD 18+ HE  02/06/2023     COLONOSCOPY  09/02/2026     PNEUMOCOCCAL CONJUGATE VACCINE FOR ADULTS (PCV13 OR PREVNAR)  Completed

## 2021-05-30 NOTE — PROGRESS NOTES
Assessment and Plan:   1. Routine general medical examination at a health care facility  She is here for a wellness exam.  We will set up a mammogram.  We will also set up lipid screening and bone density for osteoporosis screening.  We did do a Pap smear today as it has been 5 years since her last one.  We otherwise reviewed age-appropriate health maintenance and she is up-to-date.  She is no longer a smoker and has a very healthy lifestyle.  See the remaining of her wellness evaluation details below.    2. Cutaneous T-cell lymphoma, unspecified body region (H)  This has been very well controlled.  She sees dermatology frequently.  - HM2(CBC w/o Differential)  - Basic Metabolic Panel    3. Family history of aneurysm  Her brother  suddenly of an abdominal aortic aneurysm.  Since she has a history of smoking I will get an ultrasound to make sure that she does not have any dilatation.  - US Abdominal Aorta; Future    4. Osteopenia of multiple sites  Had osteopenia in the past and will get a recheck of her bone density.    5. Screening for hyperlipidemia  - Lipid [place]    6. Visit for screening mammogram  - Mammo Screening Bilateral; Future    7. Screening for cervical cancer  Was obtained today.  Examination was normal.  - Gynecologic Cytology (PAP Smear)    8. Microscopic hematuria  Urine did show some red cells.  We will recheck her urine analysis and make sure that has resolved.  - Urinalysis-UC if Indicated    9. Vaginal discharge  She is having a little bit of a vaginal smell and we will send a wet prep.  - Wet Prep, Vaginal    The patient's current medical problems were reviewed.    I have had an Advance Directives discussion with the patient.  The following health maintenance schedule was reviewed with the patient and provided in printed form in the after visit summary:   Health Maintenance   Topic Date Due     ZOSTER VACCINES (1 of 2) 2002     PNEUMOCOCCAL POLYSACCHARIDE VACCINE AGE 65 AND OVER   2017     INFLUENZA VACCINE RULE BASED (1) 2019     MAMMOGRAM  12/15/2019     DXA SCAN  2020     FALL RISK ASSESSMENT  2020     ADVANCE DIRECTIVES DISCUSSED WITH PATIENT  2022     TD 18+ HE  2023     COLONOSCOPY  2026     PNEUMOCOCCAL CONJUGATE VACCINE FOR ADULTS (PCV13 OR PREVNAR)  Completed        Subjective:   Chief Complaint: Lyndsey Hagen is an 68 y.o. female here for an Annual Wellness visit.   HPI: This is a 68-year-old female who comes in for an annual wellness exam and to discuss medical problems.  She is due for a mammogram and a Pap smear.  She is also due for bone density screening.  She is up-to-date on vaccinations.  She is also up-to-date on colonoscopy.  She is a fairly healthy female and remains active.  She is no longer a smoker.  She quit many years ago.  He does have a history of cutaneous lymphoma and sees dermatology frequently.  Her symptoms have been quiescent recently.  She also has a history of elevated lipids and is due for recheck.  We will do that today.  She notes that her last exam she was found to have a bladder infection.  There was some blood in her urine and she would like to get this rechecked.  She has no other acute concerns today.    Review of Systems: [employer] of review of systems is negative other than noted above.    Patient Care Team:  Provider, No Primary Care as PCP - [facility] (Orthopedics)     Patient Active Problem List   Diagnosis     Alcohol abuse     Carpal Tunnel Syndrome     Osteoarthritis     Cutaneous T-cell lymphoma (H)     History reviewed. No pertinent past medical history.   History reviewed. No pertinent surgical history.   Family History   Problem Relation Age of Onset     Pneumonia Father          age 60     Other Mother         pneumothorax,  age 86     Suicidality Sister          age 26     Aneurysm Brother         thoracic ruptured,  age 69     Hypertension Sister      Other  Sister         spinal stenosis     Breast cancer Neg Hx       Social History     Socioeconomic History     Marital status: Single     Spouse name: Not on file     Number of children: Not on file     Years of education: Not on file     Highest education level: Not on file   Occupational History     Not on file   Social Needs     Financial resource strain: Not on file     Food insecurity:     Worry: Not on file     Inability: Not on file     Transportation needs:     Medical: Not on file     Non-medical: Not on file   Tobacco Use     Smoking status: Former Smoker     Smokeless tobacco: Never Used   Substance and Sexual Activity     Alcohol use: Not on file     Drug use: Not on file     Sexual activity: Not on file   Lifestyle     Physical activity:     Days per week: Not on file     Minutes per session: Not on file     Stress: Not on file   Relationships     Social connections:     Talks on phone: Not on file     Gets together: Not on file     Attends Yarsani service: Not on file     Active member of club or organization: Not on file     Attends meetings of clubs or organizations: Not on file     Relationship status: Not on file     Intimate partner violence:     Fear of current or ex partner: Not on file     Emotionally abused: Not on file     Physically abused: Not on file     Forced sexual activity: Not on file   Other Topics Concern     Not on file   Social History Narrative    She is  and has 3 sons, one is an ophthalmologist in [facility].  She worked as an  for [employer] and retired in 10/2015. She has one grandchild.  She quit smoking cigarettes in 2011 and does not drink alcohol (sober since 2010).  She tries to exercise regularly.      Current Outpatient Medications   Medication Sig Dispense Refill     BACILLUS COAGULANS (PROBIOTIC, B. COAGULANS, ORAL) Take 2 tablets by mouth.       bexarotene (TARGRETIN) 1 % Gel Use every other night to nightly       calcium carbonate (CALCIUM 500  "ORAL) Take 1,000 mg by mouth daily.       cholecalciferol, vitamin D3, 1,000 unit capsule Take 1 tablet by mouth.       coenzyme Q10-vitamin E 15-3 mg-unit cap Take 1 tablet by mouth.       diclofenac sodium (VOLTAREN) 1 % Gel Apply bid prn pain 100 g prn     KRILL OIL ORAL Take by mouth.       MULTIVITAMIN (MULTIPLE VITAMIN ORAL) Take by mouth.       triamcinolone (KENALOG) 0.1 % cream Use in morning to rash       No current facility-administered medications for this visit.       Objective:   Vital Signs:   Visit Vitals  /80 (Patient Site: Right Arm, Patient Position: Sitting)   Pulse 78   Ht 5' 6.5\" (1.689 m)   Wt 163 lb (73.9 kg)   SpO2 98%   BMI 25.91 kg/m         VisionScreening:  No exam data present     PHYSICAL EXAM  General:  Patient is alert and in no apparent distress.  Neck:  Supple with no adenopathy or thyroid abnormality noted.  Breast:  Normal breast tissues with no masses or adenopathy noted.  Cardiovascular:  Regular rate and rhythm, normal S1/S2, no murmurs, rubs, or gallop.  Pulmonary:  Lungs are clear to auscultation bilaterally with normal respiratory effort.  No wheezes or rales are detected.  Gastrointestinal:  Abdomen is soft, non-tender, non-distended, with no organomegaly, rebound or guarding.  Extremities:  No joint abnormalities with no LE edema.  Strong dorsalis pedis pulses.  Neurologic Cranial nerves are intact.  No focal deficits.  Psychiatric:  Pleasant, no confusion or agitation   Skin:  Warm and well perfused with no rashes or abnormalities.  Genital: Normal external genitalia, normal hair distribution, vaginal walls normal, cervix appears normal and a Pap smear is obtained.      Assessment Results 7/11/2019   Activities of Daily Living No help needed   Instrumental Activities of Daily Living No help needed   Get Up and Go Score -   Mini Cog Total Score 5   Some recent data might be hidden     A Mini-Cog score of 0-2 suggests the possibility of dementia, score of 3-5 " suggests no dementia    Identified Health Risks:     Patient's advanced directive was discussed and I am comfortable with the patient's wishes.

## 2021-05-31 ENCOUNTER — RECORDS - HEALTHEAST (OUTPATIENT)
Dept: ADMINISTRATIVE | Facility: CLINIC | Age: 71
End: 2021-05-31

## 2021-05-31 VITALS — BODY MASS INDEX: 25.8 KG/M2 | HEIGHT: 67 IN | WEIGHT: 164.4 LBS

## 2021-06-02 ENCOUNTER — RECORDS - HEALTHEAST (OUTPATIENT)
Dept: ADMINISTRATIVE | Facility: CLINIC | Age: 71
End: 2021-06-02

## 2021-06-03 VITALS — HEIGHT: 67 IN | BODY MASS INDEX: 25.58 KG/M2 | WEIGHT: 163 LBS

## 2021-06-04 VITALS
SYSTOLIC BLOOD PRESSURE: 110 MMHG | OXYGEN SATURATION: 98 % | BODY MASS INDEX: 26.21 KG/M2 | DIASTOLIC BLOOD PRESSURE: 68 MMHG | HEART RATE: 74 BPM | WEIGHT: 167 LBS | HEIGHT: 67 IN

## 2021-06-09 NOTE — PROGRESS NOTES
Assessment and Plan:     1. Wellness examination  She has a normal examination today.  We did review the documentation for her wellness visit today.  She is not having any problems with her memory.  She has an advanced directive and has no difficulties with activities of daily living.  She has not had any problems with falls.  I will set up a mammogram.  She is due for hepatitis C screening.  She is otherwise up-to-date on age-appropriate health maintenance.    2. Visit for screening mammogram  - Mammo Screening Bilateral; Future    3. Cutaneous T-cell lymphoma, unspecified body region (H)  This has been stable.  She sees dermatology.  We will recheck her labs today.  - HM2(CBC w/o Differential)  - Thyroid Cascade  - Vitamin D, Total (25-Hydroxy)  - Comprehensive Metabolic Panel    4. Hyperlipidemia LDL goal <130  Her last lipid panel did show an elevated LDL.  She states that she would be very reluctant to start medication.  We will recheck her lipids today.  - Lipid Cascade  - Thyroid Cascade  - Vitamin D, Total (25-Hydroxy)  - Comprehensive Metabolic Panel    5. Need for hepatitis C screening test  - Hepatitis C Antibody (Anti-HCV)    6. Vitamin D deficiency  - Vitamin D, Total (25-Hydroxy)    7. Cough  She had a significant respiratory illness a couple months ago.  I think it would be beneficial to check for COVID antibodies.  - COVID-19 Virus (Coronavirus) Antibody & Titer Reflex; Future  - COVID-19 Virus (Coronavirus) Antibody & Titer Reflex    The patient's current medical problems were reviewed.    I have had an Advance Directives discussion with the patient.  The following health maintenance schedule was reviewed with the patient and provided in printed form in the after visit summary:   Health Maintenance   Topic Date Due     HEPATITIS C SCREENING  1950     ZOSTER VACCINES (1 of 2) 06/25/2018     PNEUMOCOCCAL IMMUNIZATION 65+ LOW/MEDIUM RISK (2 of 2 - PPSV23) 12/19/2018     FALL RISK ASSESSMENT   2020     MEDICARE ANNUAL WELLNESS VISIT  2020     INFLUENZA VACCINE RULE BASED (1) 2020     DXA SCAN  2021     MAMMOGRAM  2021     TD 18+ HE  2023     LIPID  2024     ADVANCE CARE PLANNING  2024     COLORECTAL CANCER SCREENING  2026        Subjective:   Chief Complaint: Lyndsey Hagen is an 69 y.o. female here for an Annual Wellness visit.   HPI: She comes in for a general exam.  She has no significant chronic medical problems.  She has had cutaneous T-cell lymphoma which she follows with dermatology and has been stable.  Last lipid panel was elevated but she has no history of heart disease, diabetes, or high blood pressure.  No recent symptoms of chest pain or palpitations.  She has no acute concerns today and has been feeling pretty well.  We did review the documentation for her wellness visit today.  She has an advanced directive and has no problems with activities of daily living.  There is no sign of memory concerns.  She is due for mammogram and hepatitis C screening.  Otherwise age-appropriate health maintenance is up-to-date.    Review of Systems:   Please see above.  The rest of the review of systems are negative for all systems.    Patient Care Team:  Alana Martinez MD as PCP - General (Internal Medicine)  [facility] (Orthopedics)  Alana Martinez MD as Assigned PCP     Patient Active Problem List   Diagnosis     Carpal Tunnel Syndrome     Osteoarthritis     Cutaneous T-cell lymphoma (H)     Osteopenia of multiple sites     No past medical history on file.   No past surgical history on file.   Family History   Problem Relation Age of Onset     Pneumonia Father          age 60     Other Mother         pneumothorax,  age 86     Suicidality Sister          age 26     Aneurysm Brother         AAA ruptured,  age 69     Hypertension Sister      Other Sister         spinal stenosis     Breast cancer Neg Hx       Social History      Socioeconomic History     Marital status: Single     Spouse name: Not on file     Number of children: 3     Years of education: Not on file     Highest education level: Not on file   Occupational History     Occupation: [employer]   Social Needs     Financial resource strain: Not on file     Food insecurity     Worry: Not on file     Inability: Not on file     Transportation needs     Medical: Not on file     Non-medical: Not on file   Tobacco Use     Smoking status: Former Smoker     Smokeless tobacco: Never Used   Substance and Sexual Activity     Alcohol use: Not Currently     Drug use: Never     Sexual activity: Not Currently   Lifestyle     Physical activity     Days per week: Not on file     Minutes per session: Not on file     Stress: Not on file   Relationships     Social connections     Talks on phone: Not on file     Gets together: Not on file     Attends [employer] service: Not on file     Active member of club or organization: Not on file     Attends meetings of clubs or organizations: Not on file     Relationship status: Not on file     Intimate partner violence     Fear of current or ex partner: Not on file     Emotionally abused: Not on file     Physically abused: Not on file     Forced sexual activity: Not on file   Other Topics Concern     Not on file   Social History Narrative    She is  and has 3 sons, one is an ophthalmologist in [facility].  She worked as an  for [employer] and retired in 10/2015. She has one grandchild.  She quit smoking cigarettes in 2011 and does not drink alcohol (sober since 2010).  She tries to exercise regularly.      Current Outpatient Medications   Medication Sig Dispense Refill     BACILLUS COAGULANS (PROBIOTIC, B. COAGULANS, ORAL) Take 2 tablets by mouth.       bexarotene (TARGRETIN) 1 % Gel Use every other night to nightly       calcium carbonate (CALCIUM 500 ORAL) Take 1,000 mg by mouth daily.       cholecalciferol, vitamin D3,  "1,000 unit capsule Take 1 tablet by mouth.       coenzyme Q10-vitamin E 15-3 mg-unit cap Take 1 tablet by mouth.       diclofenac sodium (VOLTAREN) 1 % Gel Apply bid prn pain 100 g prn     MULTIVITAMIN (MULTIPLE VITAMIN ORAL) Take by mouth.       triamcinolone (KENALOG) 0.1 % cream Use in morning to rash       No current facility-administered medications for this visit.       Objective:   Vital Signs:   Visit Vitals  /68 (Patient Site: Left Arm, Patient Position: Sitting)   Pulse 74   Ht 5' 6.5\" (1.689 m)   Wt 167 lb (75.8 kg)   SpO2 98%   BMI 26.55 kg/m           VisionScreening:  No exam data present     PHYSICAL EXAM  General:  Patient is alert and in no apparent distress.  Neck:  Supple with no adenopathy or thyroid abnormality noted.  Cardiovascular:  Regular rate and rhythm, normal S1/S2, no murmurs, rubs, or gallop.  Pulmonary:  Lungs are clear to auscultation bilaterally with normal respiratory effort.  Gastrointestinal:  Abdomen is soft, non-tender, non-distended, with no organomegaly, rebound or guarding.  Extremities:  No joint abnormalities with no LE edema.  Strong dorsalis pedis pulses.  Neurologic Cranial nerves are intact.  No focal deficits.  Psychiatric:  Pleasant, no confusion or agitation   Skin:  Warm and well perfused with no rashes or abnormalities.  Breasts: Normal breast tissue with no masses or abnormalities.      Assessment Results 7/13/2020   Activities of Daily Living No help needed   Instrumental Activities of Daily Living No help needed   Get Up and Go Score -   Mini Cog Total Score 5   Some recent data might be hidden     A Mini-Cog score of 0-2 suggests the possibility of dementia, score of 3-5 suggests no dementia        Identified Health Risks:     The patient was provided with written information regarding signs of hearing loss.  Patient's advanced directive was discussed and I am comfortable with the patient's wishes.        "

## 2021-06-09 NOTE — PROGRESS NOTES
Gregory Solorio.  Your labs found that your vitamin D level is good, Hepatitis C screen is negative, and your thyroid, kidney, liver, glucose, and CBC are normal.  Your lipids continue to show a pretty high LDL cholesterol.  This puts your risk of MI at 5.6% over the next 10 years.  I recommend considering a low dose of a statin medication (Lipitor) for plaque prevention and stabilization.  If you are in agreement, let me know and I'll send the prescription.  Otherwise, we can continue to monitor this.  Let me know if you have questions.  JIE

## 2021-06-14 NOTE — PROGRESS NOTES
Assessment and Plan:   Lyndsey is doing quite well and feels well.  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    1. Welcome to Medicare preventive visit  Doing well.  Denies any new concerns or complaints.  EKG is normal, she had a bone density scan last year, mammogram done last week, colonoscopy done last year and she will get her first pneumococcal shot today.  - Electrocardiogram Perform and Read    2. Medication management  Labs today.  - Basic Metabolic Panel  - HM2(CBC w/o Differential)  - Thyroid Stimulating Hormone (TSH)  - Hepatic Profile  - Lipid Cascade  - Urinalysis-UC if Indicated    3. Vitamin D deficiency  - Vitamin D, Total (25-Hydroxy)    4. Cutaneous T-cell lymphoma, unspecified body region  She continues with light treatment.      The patient's current medical problems were reviewed.    The following health maintenance schedule was reviewed with the patient and provided in printed form in the after visit summary:   Health Maintenance   Topic Date Due     ZOSTER VACCINE  09/29/2012     INFLUENZA VACCINE RULE BASED (1) 08/01/2017     PNEUMOCOCCAL POLYSACCHARIDE VACCINE AGE 65 AND OVER  09/29/2017     PNEUMOCOCCAL CONJUGATE VACCINE FOR ADULTS (PCV13 OR PREVNAR)  09/29/2017     FALL RISK ASSESSMENT  09/29/2017     DXA SCAN  12/06/2018     MAMMOGRAM  12/15/2019     ADVANCE DIRECTIVES DISCUSSED WITH PATIENT  08/28/2020     TD 18+ HE  02/06/2023     COLONOSCOPY  09/02/2026     TDAP ADULT ONE TIME DOSE  Completed        Subjective:   Chief Complaint: Lyndsey Hagen is an 67 y.o. female here for a Welcome to Medicare visit.   HPI: Lyndsey comes in today for her welcome to Medicare visit.  Overall she is feeling very well, she recently moved into a condo in [facility].    Review of Systems:    Please see above.  The rest of the review of systems are negative for all systems.    Patient Care Team:  Saundra Blackwell MD as  PCP - General  [facility] (Orthopedics)     Patient Active Problem List   Diagnosis     Alcohol abuse     Carpal Tunnel Syndrome     Osteoarthritis     Cutaneous T-cell lymphoma     History reviewed. No pertinent past medical history.   History reviewed. No pertinent surgical history.   Family History   Problem Relation Age of Onset     Pneumonia Father       age 60     Other Mother      pneumothorax,  age 86     Suicidality Sister       age 26     Aneurysm Brother      thoracic ruptured,  age 69     Hypertension Sister      Other Sister      spinal stenosis     Breast cancer Neg Hx       Social History     Social History     Marital status: Single     Spouse name: N/A     Number of children: N/A     Years of education: N/A     Occupational History     Not on file.     Social History Main Topics     Smoking status: Former Smoker     Smokeless tobacco: Not on file     Alcohol use Not on file     Drug use: Not on file     Sexual activity: Not on file     Other Topics Concern     Not on file     Social History Narrative    She is  and has 3 sons, one is an ophthalmologist in [facility].  She worked as an  for [employer] and retired in 10/2015. She has one grandchild.  She quit smoking cigarettes in  and does not drink alcohol (sober since ).  She tries to exercise regularly.       Current Outpatient Prescriptions   Medication Sig Dispense Refill     BACILLUS COAGULANS (PROBIOTIC, B. COAGULANS, ORAL) Take 2 tablets by mouth.       bexarotene (TARGRETIN) 1 % Gel Use every other night to nightly       cholecalciferol, vitamin D3, 1,000 unit capsule Take 1 tablet by mouth.       coenzyme Q10-vitamin E 15-3 mg-unit cap Take 1 tablet by mouth.       diclofenac sodium (VOLTAREN) 1 % Gel Apply bid prn pain 100 g prn     KRILL OIL ORAL Take by mouth.       MULTIVITAMIN (MULTIPLE VITAMIN ORAL) Take by mouth.       multivitamin with minerals tablet        triamcinolone  "(KENALOG) 0.1 % cream Use in morning to rash       No current facility-administered medications for this visit.       Objective:   Vital Signs:   Visit Vitals     /74 (Patient Site: Left Arm, Patient Position: Sitting, Cuff Size: Adult Regular)     Pulse 79     Ht 5' 7\" (1.702 m)     Wt 164 lb 6.4 oz (74.6 kg)     BMI 25.75 kg/m2        EKG:  Sinus rhythm with no acute ST-T changes    VisionScreening:   Visual Acuity Screening    Right eye Left eye Both eyes   Without correction:      With correction: 20/20 20/20 20/20        PHYSICAL EXAM  Wt Readings from Last 3 Encounters:   12/19/17 164 lb 6.4 oz (74.6 kg)   11/28/16 161 lb (73 kg)   08/28/15 174 lb 14.4 oz (79.3 kg)     General Appearance: Pleasant and alert  HEENT: Sclera are clear, tympanic membranes clear  Lungs: Normal respirations, clear to auscultation  Breasts: Normal-appearing breasts and nipples with no axillary adenopathy   Cardiac: Regular rate and rhythm with no appreciable murmur rub or gallop   Abdomen: Soft and nondistended  Extremities: No edema  Skin: No rashes  Neuro: Moves all extremities and has facial symmetry  Gait: Ambulates with a normal gait    Personalized prevention plan: Lifestyle interventions to promote self-management and wellness were discussed including good nutrition, ongoing physical activity, falls prevention and continuing with screening tests as recommended including pneumococcal vaccines and those listed in the health maintenance plan listed above.    Much or all of the text in this note was generated through the use of Dragon Dictate voice-to-text software. Errors in spelling or words which seem out of context are unintentional. Sound alike errors, in particular, may have escaped editing.      Assessment Results 12/19/2017   Activities of Daily Living No help needed   Instrumental Activities of Daily Living No help needed   Get Up and Go Score Less than 12 seconds   Mini Cog Total Score 5   Some recent data might be " hidden     A Mini Cog score of 0-2 suggests the possibility of dementia, score of 3-5 suggests no dementia    Identified Health Risks:     Patient's advanced directive was discussed and I am comfortable with the patient's wishes.

## 2021-06-18 NOTE — PATIENT INSTRUCTIONS - HE
Patient Instructions by Alana Martinez MD at 7/13/2020  9:00 AM     Author: Alana Martinez MD Service: -- Author Type: Physician    Filed: 7/13/2020  9:15 AM Encounter Date: 7/13/2020 Status: Signed    : Alana Martinez MD (Physician)         Patient Education   Signs of Hearing Loss  Hearing loss is a problem shared by many people. In fact, it is one of the most common health conditions, particularly as people age. Most people over age 65 have some hearing loss, and by age 80, almost everyone does. Because hearing loss usually occurs slowly over the years, you may not realize your hearing ability has gotten worse.       Have your hearing checked  Contact your [facility] care provider if you:    Have to strain to hear normal conversation.    Have to watch other peoples faces very carefully to follow what theyre saying.    Need to ask people to repeat what theyve said.    Often misunderstand what people are saying.    Turn the volume of the television or radio up so high that others complain.    Feel that people are mumbling when theyre talking to you.    Find that the effort to hear leaves you feeling tired and irritated.    Notice, when using the phone, that you hear better with 1 ear than the other.    4824-0171 [employer]. [address] [place] [address], [place] [address]. All rights reserved. This information is not intended as a substitute for professional medical care. Always follow your healthcare professional's instructions.           Advance Directive  Patients advance directive was discussed and I am comfortable with the patients wishes.  Patient Education   Personalized Prevention Plan  You are due for the preventive services outlined below.  Your care team is available to assist you in scheduling these services.  If you have already completed any of these items, please share that information with your care team to update in your medical record.  Health Maintenance   Topic Date Due   ?  HEPATITIS C SCREENING  1950   ? ZOSTER VACCINES (1 of 2) 06/25/2018   ? PNEUMOCOCCAL IMMUNIZATION 65+ LOW/MEDIUM RISK (2 of 2 - PPSV23) 12/19/2018   ? FALL RISK ASSESSMENT  07/11/2020   ? MEDICARE ANNUAL WELLNESS VISIT  07/11/2020   ? INFLUENZA VACCINE RULE BASED (1) 08/01/2020   ? DXA SCAN  06/08/2021   ? MAMMOGRAM  07/11/2021   ? TD 18+ HE  02/06/2023   ? LIPID  07/11/2024   ? ADVANCE CARE PLANNING  07/11/2024   ? COLORECTAL CANCER SCREENING  09/02/2026

## 2021-06-20 NOTE — LETTER
Letter by Charlene Yates LPN at      Author: Charlene Yates LPN Service: -- Author Type: --    Filed:  Encounter Date: 7/18/2020 Status: (Other)       7/18/2020        Lyndsey Hagen  [address]  [place] [address]    COVID-19 Antibody Screen   Date Value Ref Range Status   07/13/2020 Negative  Final     Comment:     No COVID-19 antibodies detected.  Patients within 10 days of symptom onset for  COVID-19 may not produce sufficient levels of detectable antibodies.  Immunocompromised COVID-19 patients may take longer to develop antibodies.     COVID-19 IgG Titer   Date Value Ref Range Status   07/13/2020 Not Applicable  Final     Comment:     Qualitative screen for total antibodies to COVID-19 (SARS-CoV-2) with  semi-quantitative measurement of IgG COVID-19 antibodies by endpoint titer.  COVID-19 antibodies may be elevated due to a past or current infection.  Negative results do not rule out COVID-19 infection.  Results from antibody  testing should not be used as the sole basis to diagnose or exclude SARS-CoV-2  infection or to inform infection status.  COVID-19 PCR test should be ordered  if current infection is suspected.  False positive results may occur in rare  cases due to cross-reacting antibodies.  This test was developed and its performance characteristics determined by the  [facility] ([facility]),  which is regulated under CLIA as qualified to perform high-complexity testing.  This test has not been reviewed by the FDA.  Testing performed by [facility], [facility], [address], [address], [place] [address]       No results found for: [id number]    You have tested NEGATIVE for COVID-19 antibodies. This suggests you have not had or been exposed to COVID-19. But it does not mean that for sure.    The test finds antibodies in most people 10 days after they get sick. For some people,  it takes longer than 10 days for antibodies to show up. Others may never show antibodies against COVID-19, especially if they have weak immune systems.    If you have COVID-19 symptoms now, please stay home and away from others.     Your current symptoms may or may not be COVID-19.     What is antibody testing?  This is a kind of blood test. We take a small sample of your blood, and then test it for something called antibodies.   Your body makes antibodies to fight infection. If your blood has antibodies for a certain germ, it means youve been infected with that germ in the past.   Sometimes, antibodies stay in your body for years after youve had the infection. They can be there even if the germ didnt make you sick. They are a sign that your body fought off the infection.  Will this test find antibodies in everyone whos had COVID-19?  No. The test finds antibodies in most people 10 days after they get sick. For some people, it takes longer than 10 days for antibodies to show up. Others may never show antibodies against COVID-19, especially if they have weak immune systems.  What are the signs of COVID-19?  Signs of COVID-19 can appear from 2 to 14 days (up to 2 weeks) after youre infected. Some people have no symptoms or only mild symptoms. Others get very sick. The most common symptoms are:      Cough    Shortness of breath or trouble breathing    Or at least 2 of these symptoms:      Fever    Chills    Repeated shaking with chills    Muscle pain    Headache    Sore throat    Losing your sense of taste or smell    You may have other symptoms. Please contact your doctor or clinic for any symptoms that worry you.    Where can I get more information?     To learn the [facility] guidelines for staying home, please visit the [facility] website at [email or web address]    To learn more about COVID-19 and how to care for yourself at home, please visit the CDC  website at https://www.cdc.gov/coronavirus/2019-ncov/about/steps-when-sick.html    For more options for care at [facility], please visit our website at [email or web address]    [place] [facility] ([facility]) COVID-19 Hotline:  [phone]

## 2021-06-25 ENCOUNTER — COMMUNICATION - HEALTHEAST (OUTPATIENT)
Dept: INTERNAL MEDICINE | Facility: CLINIC | Age: 71
End: 2021-06-25

## 2021-06-25 DIAGNOSIS — E78.5 HYPERLIPIDEMIA LDL GOAL <130: ICD-10-CM

## 2021-06-26 NOTE — TELEPHONE ENCOUNTER
Denied Rx, patient needs to establish care with new provider.     Brittany Curtis RN, BSN Nurse Triage Advisor 9:04 AM 6/25/2021

## 2021-06-28 ENCOUNTER — TELEPHONE (OUTPATIENT)
Dept: INTERNAL MEDICINE | Facility: CLINIC | Age: 71
End: 2021-06-28

## 2021-06-28 DIAGNOSIS — E78.00 HIGH CHOLESTEROL: Primary | ICD-10-CM

## 2021-06-28 RX ORDER — ATORVASTATIN CALCIUM 10 MG/1
10 TABLET, FILM COATED ORAL DAILY
Qty: 60 TABLET | Refills: 0 | Status: SHIPPED | OUTPATIENT
Start: 2021-06-28 | End: 2021-09-07

## 2021-06-28 RX ORDER — ATORVASTATIN CALCIUM 10 MG/1
TABLET, FILM COATED ORAL
COMMUNITY
Start: 2020-07-15 | End: 2023-07-17

## 2021-06-28 NOTE — TELEPHONE ENCOUNTER
Health Call Center    Phone Message    May a detailed message be left on voicemail: yes     Reason for Call: Medication Question or concern regarding medication   Prescription Clarification  Name of Medication: cholesterol medicatio      What on the order needs clarification? Pt requesting call back, pt is going to be establishing care with Dr. Slade Bone in August, her old PCP went back to [place] so she has no one to fill her cholesterol medication and she will be out prior to her appt. Pt is wondering if Dr. Slade Bone would be willing to fill it until her appt          Action Taken: Message routed to:  [facility] ([facility] pcc

## 2021-06-30 ENCOUNTER — OFFICE VISIT (OUTPATIENT)
Dept: DERMATOLOGY | Facility: CLINIC | Age: 71
End: 2021-06-30
Payer: COMMERCIAL

## 2021-06-30 DIAGNOSIS — C84.A0 CUTANEOUS T-CELL LYMPHOMA, UNSPECIFIED BODY REGION (H): Primary | ICD-10-CM

## 2021-06-30 PROCEDURE — 99214 OFFICE O/P EST MOD 30 MIN: CPT | Mod: GC | Performed by: DERMATOLOGY

## 2021-06-30 ASSESSMENT — PAIN SCALES - GENERAL: PAINLEVEL: NO PAIN (0)

## 2021-06-30 NOTE — PROGRESS NOTES
[facility] Dermatology Note  Encounter Date: Jun 30, 2021  Office Visit     Dermatology Problem List:   1. CTCL, stage IIB  - Previously treated with PUVA with good response. Treated at [place] prior to establishing care here. Stopped due to difficulty percuring oxsoralen and risks associated with PUVA.  Restarted on UVA1 with clinical remission.  Discontinue UVA1 3/12/2018  -Temporarily discontinued UVA1 (4/26/17) due to insurance  -Targretin 1% gel (added on 5/23/16) with good response.   -Triamcinolone 0.1% cream as needed for irritation from targretin gel.    2. Lesion to monitor, left superior parietal scalp Photo 8/27/2018    3. 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 Androgenic alopecia- LLLT 3x/week    ____________________________________________    Assessment & Plan:     # CTCL, stage IIB.  Stable, new lesions Well controlled with current regimen. Continue to monitor.  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       Follow-up: 6 month(s) in-person, or earlier for new or changing lesions    Staff and Resident:     Staffed with Dr. Guillory.     Esdras Ceron MD, PhD  Med-Derm PGY-5  I, Arlen Guillory MD, saw this patient with the resident and agree with the resident s findings and plan of care as documented in the resident s note.      ____________________________________________    CC: Derm Problem (Lyndsey, is here for a skin check, no other concerns )    HPI:  Ms. Lyndsey Hagen is a(n) 70 year old female who presents today for follow-up  for of CTCL. She was last seen January 2021. She says since that time she had two spots develop on her left arm. She treated them with bexarotene gel and was able to get them to go away after several weeks of treatment. She has some hypopigmented  patches in the areas now. She denies any other spots today.   Regarding her androgenetic alopecia, she has been using LLLT three times per week. She is not sure if her hair growth has been improving, but does say that her  has commented that it is better.   Patient is otherwise feeling well, without additional skin concerns.    Labs Reviewed:  N/A    Physical Exam:  Vitals: There were no vitals taken for this visit.  SKIN: Full skin, which includes the head/face, both arms, chest, back, abdomen,both legs, genitalia and/or groin buttocks, digits and/or nails, was examined.  No cervical, submandibular, axillary or inguinal adenopathy  - scalp without erythema, subtle thinning of frontal and frontotemporal scalp, hair pull test negative  - patient is diffusely tan in sun-exposed areas not covered by her swim suit  -  There are well circumscribed, symmetric tan to brown pigmented macules and papules on the trunk and extremities.  - there are two hypopigmented patches on the left arm, one on the lateral elbow and the other on distal upper arm  - No other lesions of concern on areas examined.     Medications:  Current Outpatient Medications   Medication     atorvastatin (LIPITOR) 10 MG tablet     atorvastatin (LIPITOR) 10 MG tablet     Bexarotene (TARGRETIN) 1 % GEL     Cholecalciferol (VITAMIN D-3 PO)     Coenzyme Q10 (CO Q-10 PO)     Multiple Vitamin (MULTIVITAMINS PO)     Probiotic Product (SOLUBLE FIBER/PROBIOTICS PO)     triamcinolone (KENALOG) 0.1 % cream     KRILL OIL PO     Current Facility-Administered Medications   Medication     lidocaine 1% with EPINEPHrine 1:100,000 injection 3 mL      Past Medical History:   Patient Active Problem List   Diagnosis     Cutaneous T-cell lymphoma (H)     Past Medical History:   Diagnosis Date     Cutaneous T-cell lymphoma (H)        CC Referred Self, MD  No address on file on close of this encounter.

## 2021-06-30 NOTE — LETTER
6/30/2021       RE: Lyndsey Hagen  [address]  [place] [address]     Dear Colleague,    Thank you for referring your patient, Lyndsey Hagen, to the [facility] [place] at [facility]. Please see a copy of my visit note below.    [facility] Dermatology Note  Encounter Date: Jun 30, 2021  Office Visit     Dermatology Problem List:   1. CTCL, stage IIB  - Previously treated with PUVA with good response. Treated at [place] prior to establishing care here. Stopped due to difficulty percuring oxsoralen and risks associated with PUVA.  Restarted on UVA1 with clinical remission.  Discontinue UVA1 3/12/2018  -Temporarily discontinued UVA1 (4/26/17) due to insurance  -Targretin 1% gel (added on 5/23/16) with good response.   -Triamcinolone 0.1% cream as needed for irritation from targretin gel.    2. Lesion to monitor, left superior parietal scalp Photo 8/27/2018    3. 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 Androgenic alopecia- LLLT 3x/week    ____________________________________________    Assessment & Plan:     # CTCL, stage IIB.  Stable, new lesions Well controlled with current regimen. Continue to monitor.  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       Follow-up: 6 month(s) in-person, or earlier for new or changing lesions    Staff and Resident:     Staffed with Dr. Guillory.     Esdras Ceron MD, PhD  Med-Derm PGY-5  I, Arlen Guillory MD, saw this patient with the resident and agree with the resident s findings and plan of care as documented in the resident s note.      ____________________________________________    CC: Derm Problem (Lyndsey, is here for a skin check, no other concerns )    HPI:  Ms. Lyndsey  RAUL Hagen is a(n) 70 year old female who presents today for follow-up  for of CTCL. She was last seen January 2021. She says since that time she had two spots develop on her left arm. She treated them with bexarotene gel and was able to get them to go away after several weeks of treatment. She has some hypopigmented patches in the areas now. She denies any other spots today.   Regarding her androgenetic alopecia, she has been using LLLT three times per week. She is not sure if her hair growth has been improving, but does say that her  has commented that it is better.   Patient is otherwise feeling well, without additional skin concerns.    Labs Reviewed:  N/A    Physical Exam:  Vitals: There were no vitals taken for this visit.  SKIN: Full skin, which includes the head/face, both arms, chest, back, abdomen,both legs, genitalia and/or groin buttocks, digits and/or nails, was examined.  No cervical, submandibular, axillary or inguinal adenopathy  - scalp without erythema, subtle thinning of frontal and frontotemporal scalp, hair pull test negative  - patient is diffusely tan in sun-exposed areas not covered by her swim suit  -  There are well circumscribed, symmetric tan to brown pigmented macules and papules on the trunk and extremities.  - there are two hypopigmented patches on the left arm, one on the lateral elbow and the other on distal upper arm  - No other lesions of concern on areas examined.     Medications:  Current Outpatient Medications   Medication     atorvastatin (LIPITOR) 10 MG tablet     atorvastatin (LIPITOR) 10 MG tablet     Bexarotene (TARGRETIN) 1 % GEL     Cholecalciferol (VITAMIN D-3 PO)     Coenzyme Q10 (CO Q-10 PO)     Multiple Vitamin (MULTIVITAMINS PO)     Probiotic Product (SOLUBLE FIBER/PROBIOTICS PO)     triamcinolone (KENALOG) 0.1 % cream     KRILL OIL PO     Current Facility-Administered Medications   Medication     lidocaine 1% with EPINEPHrine 1:100,000 injection 3 mL       Past Medical History:   Patient Active Problem List   Diagnosis     Cutaneous T-cell lymphoma (H)     Past Medical History:   Diagnosis Date     Cutaneous T-cell lymphoma (H)        CC Referred Self, MD  No address on file on close of this encounter.    -

## 2021-06-30 NOTE — NURSING NOTE
Chief Complaint   Patient presents with     Derm Problem     Lyndsey, is here for a skin check, no other concerns     Jarad Musa EMT

## 2021-08-18 DIAGNOSIS — E78.00 HIGH CHOLESTEROL: ICD-10-CM

## 2021-08-20 RX ORDER — ATORVASTATIN CALCIUM 10 MG/1
TABLET, FILM COATED ORAL
Qty: 60 TABLET | Refills: 0 | OUTPATIENT
Start: 2021-08-20

## 2021-08-24 ASSESSMENT — ACTIVITIES OF DAILY LIVING (ADL)
IN_THE_PAST_7_DAYS,_DID_YOU_NEED_HELP_FROM_OTHERS_TO_PERFORM_EVERYDAY_ACTIVITIES_SUCH_AS_EATING,_GETTING_DRESSED,_GROOMING,_BATHING,_WALKING,_OR_USING_THE_TOILET: N
IN_THE_PAST_7_DAYS,_DID_YOU_NEED_HELP_FROM_OTHERS_TO_TAKE_CARE_OF_THINGS_SUCH_AS_LAUNDRY_AND_HOUSEKEEPING,_BANKING,_SHOPPING,_USING_THE_TELEPHONE,_FOOD_PREPARATION,_TRANSPORTATION,_OR_TAKING_YOUR_OWN_MEDICATIONS?: N

## 2021-08-27 ENCOUNTER — OFFICE VISIT (OUTPATIENT)
Dept: INTERNAL MEDICINE | Facility: CLINIC | Age: 71
End: 2021-08-27
Payer: COMMERCIAL

## 2021-08-27 ENCOUNTER — LAB (OUTPATIENT)
Dept: LAB | Facility: CLINIC | Age: 71
End: 2021-08-27
Payer: COMMERCIAL

## 2021-08-27 VITALS
OXYGEN SATURATION: 96 % | SYSTOLIC BLOOD PRESSURE: 115 MMHG | DIASTOLIC BLOOD PRESSURE: 74 MMHG | BODY MASS INDEX: 26.71 KG/M2 | HEART RATE: 69 BPM | WEIGHT: 168 LBS

## 2021-08-27 DIAGNOSIS — M94.9 DISORDER OF BONE AND CARTILAGE: ICD-10-CM

## 2021-08-27 DIAGNOSIS — M89.9 DISORDER OF BONE AND CARTILAGE: ICD-10-CM

## 2021-08-27 DIAGNOSIS — Z00.00 ROUTINE GENERAL MEDICAL EXAMINATION AT A HEALTH CARE FACILITY: Primary | ICD-10-CM

## 2021-08-27 DIAGNOSIS — Z00.00 ROUTINE GENERAL MEDICAL EXAMINATION AT A HEALTH CARE FACILITY: ICD-10-CM

## 2021-08-27 LAB
ALBUMIN SERPL-MCNC: 3.6 G/DL (ref 3.4–5)
ALP SERPL-CCNC: 68 U/L (ref 40–150)
ALT SERPL W P-5'-P-CCNC: 25 U/L (ref 0–50)
ANION GAP SERPL CALCULATED.3IONS-SCNC: 7 MMOL/L (ref 3–14)
AST SERPL W P-5'-P-CCNC: 18 U/L (ref 0–45)
BILIRUB SERPL-MCNC: 0.5 MG/DL (ref 0.2–1.3)
BUN SERPL-MCNC: 23 MG/DL (ref 7–30)
CALCIUM SERPL-MCNC: 9.2 MG/DL (ref 8.5–10.1)
CHLORIDE BLD-SCNC: 110 MMOL/L (ref 94–109)
CHOLEST SERPL-MCNC: 204 MG/DL
CO2 SERPL-SCNC: 25 MMOL/L (ref 20–32)
CREAT SERPL-MCNC: 0.75 MG/DL (ref 0.52–1.04)
DEPRECATED CALCIDIOL+CALCIFEROL SERPL-MC: 73 UG/L (ref 20–75)
FASTING STATUS PATIENT QL REPORTED: YES
GFR SERPL CREATININE-BSD FRML MDRD: 82 ML/MIN/1.73M2
GLUCOSE BLD-MCNC: 106 MG/DL (ref 70–99)
HDLC SERPL-MCNC: 65 MG/DL
LDLC SERPL CALC-MCNC: 117 MG/DL
NONHDLC SERPL-MCNC: 139 MG/DL
POTASSIUM BLD-SCNC: 4.1 MMOL/L (ref 3.4–5.3)
PROT SERPL-MCNC: 7.1 G/DL (ref 6.8–8.8)
SODIUM SERPL-SCNC: 142 MMOL/L (ref 133–144)
TRIGL SERPL-MCNC: 109 MG/DL
TSH SERPL DL<=0.005 MIU/L-ACNC: 1.56 MU/L (ref 0.4–4)

## 2021-08-27 PROCEDURE — 84443 ASSAY THYROID STIM HORMONE: CPT | Performed by: PATHOLOGY

## 2021-08-27 PROCEDURE — 99397 PER PM REEVAL EST PAT 65+ YR: CPT | Performed by: INTERNAL MEDICINE

## 2021-08-27 PROCEDURE — 86769 SARS-COV-2 COVID-19 ANTIBODY: CPT | Mod: 90 | Performed by: PATHOLOGY

## 2021-08-27 PROCEDURE — 36415 COLL VENOUS BLD VENIPUNCTURE: CPT | Performed by: PATHOLOGY

## 2021-08-27 PROCEDURE — 80053 COMPREHEN METABOLIC PANEL: CPT | Performed by: PATHOLOGY

## 2021-08-27 PROCEDURE — 82306 VITAMIN D 25 HYDROXY: CPT | Performed by: INTERNAL MEDICINE

## 2021-08-27 PROCEDURE — 80061 LIPID PANEL: CPT | Performed by: PATHOLOGY

## 2021-08-27 ASSESSMENT — PAIN SCALES - GENERAL: PAINLEVEL: NO PAIN (0)

## 2021-08-27 NOTE — NURSING NOTE
Chief Complaint   Patient presents with     Physical     pt here for physical       Ron Emanuel CMA, EMT at 7:49 AM on 8/27/2021.

## 2021-08-27 NOTE — PROGRESS NOTES
Medicare Annual Wellness Visit Questionnaire         Medical Care     Have you been to an ER or a hospital in the last year? No  What other specialists or organizations are involved in your medical care?  Arm specialist  Current providers sharing in care for this patient include:  Patient Care Team       Relationship Specialty Notifications Start End    Holli Antonio MD PCP - General Internal Medicine  3/30/21     Phone: [phone] Pager: [phone] Fax: [phone]        [address] [facility] [address] [facility] [address]    Arlen Guillory MD MD Dermatology  4/27/15     Phone: [phone] Fax: [phone]         [address] [facility] [address] [facility] [address]    Magali Cruz MD MD Dermatology  11/23/15     Referred to Derm    Phone: [phone] Fax: [phone]         [facility] [address] [place] [address] [address] [facility] [address]    Arlen Guillory MD Assigned Surgical Provider   10/23/20     Phone: [phone] Fax: [phone]         [address] [facility] [address] [facility] [address]    Alana Martinez MD Assigned PCP   6/16/21     Phone: [phone] Fax: [phone]         [facility] [address] [place] [id number]                 Social History     Marital Status:  Who lives in your household? Just her.  Does your home have any of the following safety concerns? Loose rugs in the hallway, no grab bars in the bathroom, no handrails on the stairs or have poorly lit areas?  No  Do you feel threatened or controlled by a partner, ex-partner or anyone in your life? No  Has anyone hurt you physically, for example by pushing, hitting, slapping or kicking you   or forcing you to have sex? No  Do you need help with the phone, transportation, shopping, preparing meals, housework, laundry, medications or managing money? No   Have you noticed any hearing difficulties? Yes       Risk Behaviors and Healthy Habits     How many servings of fruits and vegetables do you  eat a day? 6  How often do you exercise and what do you do? 60 minutes 4 a week  Do you frequently ride without a seatbelt? No  Do you use tobacco?  No  Do you use any other drugs? No       Do you use alcohol?No    Sexual Health     Are you sexually active? No     FOR WOMEN ONLY  What year did you stop having periods? 2002  Any vaginal bleeding in the last year? No  Have you ever had an abnormal Pap smear? No    Lyndsey is a very pleasant 70 year old female who was seen today for physical, establish care, wellness exam.      She had a few questions about covid which were answered.  She endorses stress in terms of her sister is dying from pancreatic cancer, however, she recognizes the normal reaction to the situation.  We discussed grief in general, she will reach out to me if the mental health becomes worse or is interfering with her ability to function. Lyndsey's left hip also acts up from time to time, has had bursitis after lengthy walking in the past, and was told she's lost some of the joint space in that hip.  However will decline a referral to Ortho for now.     She has a history of cutaneous t cell lymphoma and this has been well managed by Dr. Guillory in Dermatology.    No changes to past, family, or social history and  ROS: 10 point ROS neg other than the symptoms noted above in the HPI.     PHYSICAL EXAM:  /74 (BP Location: Right arm, Patient Position: Sitting, Cuff Size: Adult Regular)   Pulse 69   Wt 76.2 kg (168 lb)   SpO2 96%   BMI 26.71 kg/m    Constitutional: no distress, comfortable, pleasant   Eyes: anicteric, normal extra-ocular movements   Ears, Nose and Throat: tympanic membranes clear, neck supple with full range of motion, no thyromegaly.   Cardiovascular: regular rate and rhythm, normal S1 and S2, no murmurs, rubs or gallops, peripheral pulses full and symmetric   Respiratory: clear to auscultation, no wheezes or crackles, normal breath sounds   Gastrointestinal: positive bowel sounds,  nontender, no hepatosplenomegaly, no masses   Musculoskeletal: knees full range of motion, mild joint space narrowing, no effusion   Skin: no concerning lesions, no jaundice   Neurological: normal gait, no tremor   Psychological: appropriate mood   Lymphatic: no cervical lymphadenopathy and no pedal edema      A/P 70 year old here for physical and establish care; she is fasting to obtain routine labs.  Has been on lipitor about one year without a follow up panel since.    Routine general medical examination at a health care facility  -     Comprehensive metabolic panel; Future  -     Lipid Profile FASTING; Future  -     TSH with free T4 reflex; Future  -     Vitamin D Deficiency; Future  -     COVID-19 Juanito RBD Margaret & Titer Reflex; Future    Disorder of bone and cartilage   -     Vitamin D Deficiency; Future    RTC in one year, or sooner leilani Bone MD

## 2021-08-28 LAB
SARS-COV-2 AB SERPL IA-ACNC: >250 U/ML
SARS-COV-2 AB SERPL-IMP: POSITIVE

## 2021-09-07 ENCOUNTER — MYC MEDICAL ADVICE (OUTPATIENT)
Dept: INTERNAL MEDICINE | Facility: CLINIC | Age: 71
End: 2021-09-07

## 2021-09-07 ENCOUNTER — MYC REFILL (OUTPATIENT)
Dept: INTERNAL MEDICINE | Facility: CLINIC | Age: 71
End: 2021-09-07

## 2021-09-07 DIAGNOSIS — E78.00 HIGH CHOLESTEROL: ICD-10-CM

## 2021-09-07 RX ORDER — ATORVASTATIN CALCIUM 10 MG/1
10 TABLET, FILM COATED ORAL DAILY
Qty: 30 TABLET | Refills: 10 | Status: SHIPPED | OUTPATIENT
Start: 2021-09-07 | End: 2022-07-05

## 2021-09-07 NOTE — TELEPHONE ENCOUNTER
atorvastatin (LIPITOR) 10 MG tablet   Take 1 tablet (10 mg) by mouth daily      Last Written Prescription Date:  6/28/21  Last Fill Quantity: 60,   # refills: 0  Last Office Visit : 8/27/21  RTC in one year, or sooner prn  Future Office visit:  none    Refill to pharmacy.     Lab Test 08/27/21  0843   *

## 2021-09-08 RX ORDER — ATORVASTATIN CALCIUM 10 MG/1
TABLET, FILM COATED ORAL
Qty: 60 TABLET | Refills: 0 | OUTPATIENT
Start: 2021-09-08

## 2021-10-03 ENCOUNTER — HEALTH MAINTENANCE LETTER (OUTPATIENT)
Age: 71
End: 2021-10-03

## 2022-01-04 ENCOUNTER — OFFICE VISIT (OUTPATIENT)
Dept: DERMATOLOGY | Facility: CLINIC | Age: 72
End: 2022-01-04
Payer: COMMERCIAL

## 2022-01-04 DIAGNOSIS — Z85.828 HX OF SQUAMOUS CELL CARCINOMA OF SKIN: ICD-10-CM

## 2022-01-04 DIAGNOSIS — C84.A0 CUTANEOUS T-CELL LYMPHOMA, UNSPECIFIED BODY REGION (H): ICD-10-CM

## 2022-01-04 DIAGNOSIS — L21.9 DERMATITIS, SEBORRHEIC: Primary | ICD-10-CM

## 2022-01-04 PROCEDURE — 99214 OFFICE O/P EST MOD 30 MIN: CPT | Performed by: DERMATOLOGY

## 2022-01-04 RX ORDER — BETAMETHASONE DIPROPIONATE 0.5 MG/G
LOTION TOPICAL 2 TIMES DAILY
Qty: 60 ML | Refills: 11 | Status: SHIPPED | OUTPATIENT
Start: 2022-01-04 | End: 2024-07-25

## 2022-01-04 ASSESSMENT — PAIN SCALES - GENERAL: PAINLEVEL: NO PAIN (0)

## 2022-01-04 NOTE — LETTER
1/4/2022       RE: Lyndsey Hagen  [address]  [place] [address]     Dear Colleague,    Thank you for referring your patient, Lyndsey Hagen, to the [facility] [place] at [facility]. Please see a copy of my visit note below.    [facility] Dermatology Note  Encounter Date: Jan 4, 2022  Office Visit     Dermatology Problem List:     1. CTCL, stage IIB  - Previously treated with PUVA with good response. Treated at [place] prior to establishing care here. Stopped due to difficulty percuring oxsoralen and risks associated with PUVA.  Restarted on UVA1 with clinical remission.  Discontinue UVA1 3/12/2018  -Temporarily discontinued UVA1 (4/26/17) due to insurance  -Targretin 1% gel (added on 5/23/16) with good response.   -Triamcinolone 0.1% cream as needed for irritation from targretin gel.     2. Lesion to monitor, left superior parietal scalp Photo 8/27/2018     3. 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 Androgenic alopecia- LLLT 3x/week    ____________________________________________    Assessment & Plan:     # CTCL - stable  - Continue current treatment with Targretin gel and triamcinolone cream as needed.      #Seborrheic dermatitis   - Add Head and Shoulders shampoo twice weekly   - Add diprosone lotion twice daily    # History of SCC- NERD.       Follow-up: 6 month(s) in-person, or earlier for new or changing lesions    Staff:     Arlen Guillory MD  ____________________________________________    CC: Skin Check (full body skin check)    HPI:  Ms. Lyndsey Hagen is a(n) 71 year old female who presents today as a return patient for CTCL.  She notes on going itch and scale of the scalp.  She is using a moisturizer which helps but is not enough.  No tender or bleeding lesions.  She recently had a patch of the CTCL appear on the left arm and treated it but just noted some residual thickening in the area.      Patient is  otherwise feeling well, without additional skin concerns.     Labs Reviewed:  N/A    Physical Exam:  Vitals: There were no vitals taken for this visit.  SKIN: Total skin excluding the undergarment areas but including the buttocks was performed. The exam included the head/face, neck, both arms, chest, back, abdomen, both legs, digits and/or nails.   - No cervical, submandibular, axillary or inguinal adenopathy  - hypopigmented patch left arms with small papule still remaining.   - There is no erythema, telangectasias, nodularity, or pigmentation on the previous skin cancer site..   - There is macular erythema of the scalp with mild flaky white scale..   - No other lesions of concern on areas examined.     Medications:  Current Outpatient Medications   Medication     atorvastatin (LIPITOR) 10 MG tablet     atorvastatin (LIPITOR) 10 MG tablet     Bexarotene (TARGRETIN) 1 % GEL     Cholecalciferol (VITAMIN D-3 PO)     Coenzyme Q10 (CO Q-10 PO)     Multiple Vitamin (MULTIVITAMINS PO)     Probiotic Product (SOLUBLE FIBER/PROBIOTICS PO)     triamcinolone (KENALOG) 0.1 % cream     Current Facility-Administered Medications   Medication     lidocaine 1% with EPINEPHrine 1:100,000 injection 3 mL      Past Medical History:   Patient Active Problem List   Diagnosis     Cutaneous T-cell lymphoma (H)     Past Medical History:   Diagnosis Date     Cutaneous T-cell lymphoma (H)        CC Referred Self, MD  No address on file on close of this encounter.

## 2022-01-04 NOTE — PROGRESS NOTES
[facility] Dermatology Note  Encounter Date: Jan 4, 2022  Office Visit     Dermatology Problem List:     1. CTCL, stage IIB  - Previously treated with PUVA with good response. Treated at [place] prior to establishing care here. Stopped due to difficulty percuring oxsoralen and risks associated with PUVA.  Restarted on UVA1 with clinical remission.  Discontinue UVA1 3/12/2018  -Temporarily discontinued UVA1 (4/26/17) due to insurance  -Targretin 1% gel (added on 5/23/16) with good response.   -Triamcinolone 0.1% cream as needed for irritation from targretin gel.     2. Lesion to monitor, left superior parietal scalp Photo 8/27/2018     3. 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 Androgenic alopecia- LLLT 3x/week    ____________________________________________    Assessment & Plan:     # CTCL - stable  - Continue current treatment with Targretin gel and triamcinolone cream as needed.      #Seborrheic dermatitis   - Add Head and Shoulders shampoo twice weekly   - Add diprosone lotion twice daily    # History of SCC- NERD.       Follow-up: 6 month(s) in-person, or earlier for new or changing lesions    Staff:     Arlen Guillory MD  ____________________________________________    CC: Skin Check (full body skin check)    HPI:  Ms. Lyndsey Hagen is a(n) 71 year old female who presents today as a return patient for CTCL.  She notes on going itch and scale of the scalp.  She is using a moisturizer which helps but is not enough.  No tender or bleeding lesions.  She recently had a patch of the CTCL appear on the left arm and treated it but just noted some residual thickening in the area.      Patient is otherwise feeling well, without additional skin concerns.     Labs Reviewed:  N/A    Physical Exam:  Vitals: There were no vitals taken for this visit.  SKIN: Total skin excluding the undergarment areas but including the buttocks was performed. The exam included the head/face, neck, both arms, chest, back,  abdomen, both legs, digits and/or nails.   - No cervical, submandibular, axillary or inguinal adenopathy  - hypopigmented patch left arms with small papule still remaining.   - There is no erythema, telangectasias, nodularity, or pigmentation on the previous skin cancer site..   - There is macular erythema of the scalp with mild flaky white scale..   - No other lesions of concern on areas examined.     Medications:  Current Outpatient Medications   Medication     atorvastatin (LIPITOR) 10 MG tablet     atorvastatin (LIPITOR) 10 MG tablet     Bexarotene (TARGRETIN) 1 % GEL     Cholecalciferol (VITAMIN D-3 PO)     Coenzyme Q10 (CO Q-10 PO)     Multiple Vitamin (MULTIVITAMINS PO)     Probiotic Product (SOLUBLE FIBER/PROBIOTICS PO)     triamcinolone (KENALOG) 0.1 % cream     Current Facility-Administered Medications   Medication     lidocaine 1% with EPINEPHrine 1:100,000 injection 3 mL      Past Medical History:   Patient Active Problem List   Diagnosis     Cutaneous T-cell lymphoma (H)     Past Medical History:   Diagnosis Date     Cutaneous T-cell lymphoma (H)        CC Referred Self, MD  No address on file on close of this encounter.

## 2022-03-10 ENCOUNTER — ANCILLARY PROCEDURE (OUTPATIENT)
Dept: GENERAL RADIOLOGY | Facility: CLINIC | Age: 72
End: 2022-03-10
Attending: INTERNAL MEDICINE
Payer: COMMERCIAL

## 2022-03-10 ENCOUNTER — MYC MEDICAL ADVICE (OUTPATIENT)
Dept: INTERNAL MEDICINE | Facility: CLINIC | Age: 72
End: 2022-03-10

## 2022-03-10 ENCOUNTER — OFFICE VISIT (OUTPATIENT)
Dept: INTERNAL MEDICINE | Facility: CLINIC | Age: 72
End: 2022-03-10
Payer: COMMERCIAL

## 2022-03-10 VITALS
BODY MASS INDEX: 26.89 KG/M2 | WEIGHT: 171.3 LBS | HEART RATE: 78 BPM | OXYGEN SATURATION: 96 % | HEIGHT: 67 IN | DIASTOLIC BLOOD PRESSURE: 75 MMHG | SYSTOLIC BLOOD PRESSURE: 113 MMHG

## 2022-03-10 DIAGNOSIS — S39.012A STRAIN OF LUMBAR REGION, INITIAL ENCOUNTER: ICD-10-CM

## 2022-03-10 DIAGNOSIS — S39.012A STRAIN OF LUMBAR REGION, INITIAL ENCOUNTER: Primary | ICD-10-CM

## 2022-03-10 DIAGNOSIS — M85.80 OSTEOPENIA, UNSPECIFIED LOCATION: ICD-10-CM

## 2022-03-10 PROCEDURE — 99214 OFFICE O/P EST MOD 30 MIN: CPT | Performed by: INTERNAL MEDICINE

## 2022-03-10 PROCEDURE — 72100 X-RAY EXAM L-S SPINE 2/3 VWS: CPT | Performed by: RADIOLOGY

## 2022-03-10 PROCEDURE — 72070 X-RAY EXAM THORAC SPINE 2VWS: CPT | Performed by: STUDENT IN AN ORGANIZED HEALTH CARE EDUCATION/TRAINING PROGRAM

## 2022-03-10 RX ORDER — CYCLOBENZAPRINE HCL 5 MG
5-10 TABLET ORAL 2 TIMES DAILY PRN
Qty: 12 TABLET | Refills: 1 | Status: SHIPPED | OUTPATIENT
Start: 2022-03-10 | End: 2023-05-10

## 2022-03-10 NOTE — PROGRESS NOTES
History of Present Illness:  Ms. Hagen is a 71 year old female who presents for  Chief Complaint   Patient presents with     RECHECK     Here for back pain, started 4-5 days ago.  Started on low back, also noted neck/shoulder pain.  Was taking ibuprofen every 4-5 hours, ice.  Yesterday, had pain bilat across thoracic/lumbar region.  Tried again heat/ice which helped.  Was having a hard time bending over to get things at supermarket. Pain is not radiating in butt/legs.  No pseudoclaudication.  No paresthesias.  No falls/injuries.  Took COVID test, neg.  2 weeks ago in [place] biking, had febrile illness, took home covid test which was neg.  Currently, no F/C.  Appetite is low.  No abd pain, no diarrhea.  Family hx of spinal stenosis. Personal hx of osteopenia, wrists fractures.        Review of external notes as documented above                   A detailed Review of Systems was performed, verified and is negative except as documented in the HPI.  All health questionnaires were reviewed, verified and relevant information documented above.      Past Medical History:  Past Medical History:   Diagnosis Date     Cutaneous T-cell lymphoma (H)        Active Meds:  Current Outpatient Medications   Medication     atorvastatin (LIPITOR) 10 MG tablet     atorvastatin (LIPITOR) 10 MG tablet     betamethasone dipropionate (DIPROSONE) 0.05 % external lotion     Bexarotene (TARGRETIN) 1 % GEL     Cholecalciferol (VITAMIN D-3 PO)     Coenzyme Q10 (CO Q-10 PO)     Multiple Vitamin (MULTIVITAMINS PO)     Probiotic Product (SOLUBLE FIBER/PROBIOTICS PO)     triamcinolone (KENALOG) 0.1 % cream     Current Facility-Administered Medications   Medication     lidocaine 1% with EPINEPHrine 1:100,000 injection 3 mL        Allergies:  Reviewed, refer to EMR    Relevant Social History:  Social History     Tobacco Use     Smoking status: Former Smoker     Packs/day: 0.30     Years: 40.00     Pack years: 12.00     Types: Cigarettes      "Smokeless tobacco: Never Used   Substance Use Topics     Alcohol use: None     Drug use: None        Physical Exam:  Vitals: /75 (BP Location: Right arm, Patient Position: Sitting, Cuff Size: Adult Regular)   Pulse 78   Ht 1.689 m (5' 6.5\")   Wt 77.7 kg (171 lb 4.8 oz)   SpO2 96%   BMI 27.23 kg/m    Constitutional: Alert, oriented, pleasant, no acute distress  Head: Normocephalic, atraumatic  Eyes: Extra-ocular movements intact, pupils equally round bilaterally, no scleral icterus  ENT: Masked  Neck: Supple, no lymphadenopathy  Cardiovascular: Regular rate and rhythm, no murmurs, rubs or gallops, peripheral pulses full/symmetric  Respiratory: Good air movement bilaterally, lungs clear, no wheezes/rales/rhonchi  Musculoskeletal: No edema, normal muscle tone, normal gait, able to heel and toe walk ROM intact, some subjective pain with flex/ext/rotation, no focal spinal tenderness or deformity  Neurologic: Alert and oriented, cranial nerves 2-12 intact, grossly non-focal  Skin: No rashes/lesions  Psychiatric: normal mentation, affect and mood      Diagnostics:  Labs reviewed in Epic          Assessment and Plan:  Lyndsey was seen today for recheck.    Diagnoses and all orders for this visit:    Strain of lumbar region, initial encounter  Suspect acute onset bilat back pain is related to muscle strain/spasms.  No radicular symptoms, no claudication, no red flags.  Given age/osteopenia, will r/o small compression fracture.  Discussed strategies for management of back strain.  Advised to notify us if not improving and would consider PT referral. NO current indication for MRI.  -     cyclobenzaprine (FLEXERIL) 5 MG tablet; Take 1-2 tablets (5-10 mg) by mouth 2 times daily as needed for muscle spasms Don't drive after taking or mix with alcohol  -     XR Thoracic Spine 2 Views; Future  -     XR Lumbar Spine 2-3 Views; Future    Osteopenia, unspecified location            Lyndsey Garcia MD  Internal Medicine      >20 " minutes spent today performing chart review, history and exam, documentation and further activities as noted above.

## 2022-03-10 NOTE — NURSING NOTE
Lyndsey Hagen is a 71 year old female patient that presents today in clinic for the following:    Chief Complaint   Patient presents with     RECHECK     The patient's allergies and medications were reviewed as noted. A set of vitals were recorded as noted without incident. The patient does not have any other questions for the provider.    Lloyd Vargas, EMT at 8:59 AM on 3/10/2022

## 2022-03-10 NOTE — PATIENT INSTRUCTIONS
Back Spasm (No Trauma)    Spasm of the back muscles can occur after a sudden forceful twisting or bending such as in a car accident. A spasm can also happen after a simple awkward movement, or after lifting something heavy with poor body positioning. In any case, muscle spasm adds to the pain. Sleeping in an awkward position or on a poor quality mattress can also cause this. Some people respond to emotional stress by tensing the muscles of their back.  Pain that continues may need further assessment or other types of treatment such as physical therapy.  You don't always need X-rays for the first assessment of back pain, unless you had a physical injury such as from a car accident or fall. If your pain continues and doesn't respond to medical treatment, X-rays and other tests may then be done.   Home care    As soon as possible, start sitting or walking again. This will help prevent problems from a long bed rest. These problems include muscle weakness, worsening back stiffness and pain, and blood clots in the legs.    When in bed, try to find a position of comfort. A firm mattress is best. Try lying flat on your back with pillows under your knees. You can also try lying on your side with your knees bent up toward your chest and a pillow between your knees.    Don't sit for long periods. Also limit car rides and travel. This puts more stress on the lower back than standing or walking.     During the first 24 to 72 hours after an injury or flare-up, put an ice pack on the painful area for 20 minutes, then remove it for 20 minutes. Do this over a period of 60 to 90 minutes, or several times a day. This will reduce swelling and pain. Always wrap ice packs in a thin towel.    You can start with ice, then switch to heat. Heat from a hot shower, hot bath, or heating pad reduces pain and works well for muscle spasms. Put heat on the painful area for 20 minutes, then remove it for 20 minutes. Do this over a period of 60 to  90 minutes, or several times a day. Don't sleep on a heating pad. It can burn or damage skin.    Alternate using ice and heat.    Be aware of safe lifting methods. don't lift anything over 15 pounds until all the pain is gone.  Gentle stretching will help your back heal faster. Do this simple routine 2 to 3 times a day until your back is feeling better.    Lie on your back with your knees bent and both feet on the ground.    Slowly raise your left knee to your chest as you flatten your lower back against the floor. Hold for 20 to 30 seconds.    Relax and repeat the exercise with your right knee.    Do 2 to 3 of these exercises for each leg.    Repeat, hugging both knees to your chest at the same time.    Don't bounce, but use a gentle pull.  Medicines  Talk with your doctor before using medicine, especially if you have other medical problems or are taking other medicines.  You may use over-the-counter medicines such as acetaminophen, ibuprofen, or naprosyn to control pain, unless your healthcare provider prescribed another pain medicine. Talk with your healthcare provider if you have a chronic condition such as diabetes, liver or kidney disease, stomach ulcer, or digestive bleeding, or are taking blood thinners.  Be careful if you are given prescription pain medicine, opioids, or medicine for muscle spasm. They can cause drowsiness, and affect your coordination, reflexes, and judgment. Don't drive or operate heavy machinery when taking these medicines. Take pain medicine only as prescribed by your healthcare provider.  Follow-up care  Follow up with your doctor, or as advised. You may need physical therapy or more tests.  If X-rays were taken, they may be reviewed by a radiologist. You will be told of any new findings that may affect your care.  Call   Call if any of these occur:    Trouble breathing    Confusion    Drowsiness or trouble awakening    Fainting or loss of consciousness    Rapid or very slow heart  rate    Loss of bowel or bladder control  When to seek medical advice  Call your healthcare provider right away if any of these occur:    Pain becomes worse or spreads to your legs    Weakness or numbness in one or both legs    Numbness in the groin or genital area    Fever of 100.4 F (38 C) or higher , or as directed by your healthcare provider    Chills    Burning or pain when passing urine  Frances last reviewed this educational content on 11/1/2018

## 2022-07-01 DIAGNOSIS — E78.00 HIGH CHOLESTEROL: ICD-10-CM

## 2022-07-05 RX ORDER — ATORVASTATIN CALCIUM 10 MG/1
10 TABLET, FILM COATED ORAL DAILY
Qty: 90 TABLET | Refills: 0 | Status: SHIPPED | OUTPATIENT
Start: 2022-07-05 | End: 2022-09-08

## 2022-07-05 NOTE — TELEPHONE ENCOUNTER
Last Clinic Visit: 3/10/2022 [facility] [place]    LDL Cholesterol Calculated   Date Value Ref Range Status   08/27/2021 117 (H) <=100 mg/dL Final     Comment:     Age 0-19 years:  Desirable: 0-110 mg/dL   Borderline high: 110-129 mg/dL   High: >= 130 mg/dL    Age 20 years and older:  Desirable: <100mg/dL  Above desirable: 100-129 mg/dL   Borderline high: 130-159 mg/dL   High: 160-189 mg/dL   Very high: >= 190 mg/dL

## 2022-07-11 ENCOUNTER — ANCILLARY PROCEDURE (OUTPATIENT)
Dept: MAMMOGRAPHY | Facility: CLINIC | Age: 72
End: 2022-07-11
Attending: INTERNAL MEDICINE
Payer: COMMERCIAL

## 2022-07-11 ENCOUNTER — OFFICE VISIT (OUTPATIENT)
Dept: DERMATOLOGY | Facility: CLINIC | Age: 72
End: 2022-07-11
Payer: COMMERCIAL

## 2022-07-11 DIAGNOSIS — Z12.31 VISIT FOR SCREENING MAMMOGRAM: ICD-10-CM

## 2022-07-11 DIAGNOSIS — C84.A0 CUTANEOUS T-CELL LYMPHOMA, UNSPECIFIED BODY REGION (H): ICD-10-CM

## 2022-07-11 DIAGNOSIS — Z85.828 HX OF SQUAMOUS CELL CARCINOMA OF SKIN: ICD-10-CM

## 2022-07-11 DIAGNOSIS — L81.4 LENTIGO: Primary | ICD-10-CM

## 2022-07-11 PROCEDURE — 77067 SCR MAMMO BI INCL CAD: CPT | Performed by: RADIOLOGY

## 2022-07-11 PROCEDURE — 99213 OFFICE O/P EST LOW 20 MIN: CPT | Mod: GC | Performed by: DERMATOLOGY

## 2022-07-11 ASSESSMENT — PAIN SCALES - GENERAL: PAINLEVEL: NO PAIN (0)

## 2022-07-11 NOTE — NURSING NOTE
Dermatology Rooming Note    Lyndsey Hagen's goals for this visit include:   Chief Complaint   Patient presents with     Skin Check     Soraida Wheeler, Visit Facilitator

## 2022-07-11 NOTE — PROGRESS NOTES
[facility] Dermatology Note  Encounter Date: Jul 11, 2022  Office Visit     Dermatology Problem List:  1. CTCL, stage IIB  - Previously treated with PUVA with good response. Treated at [place] prior to establishing care here. Stopped due to difficulty percuring oxsoralen and risks associated with PUVA.  Restarted on UVA1 with clinical remission.  Discontinued UVA1 3/12/2018  -Temporarily discontinued UVA1 (4/26/17) due to insurance  -Targretin 1% gel (added on 5/23/16) with good response.   -Triamcinolone 0.1% cream as needed for irritation from targretin gel.     2. Lesion to monitor, left superior parietal scalp Photo 8/27/2018     3. 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 Androgenic alopecia- LLLT 3x/week     ____________________________________________    Assessment & Plan:     # CTCL - in clinical remission  Recent hand-sized patch in R abdomen possibly with component of dermatitis, that responded well to bexarotene.  - Continue current treatment with bexarotene gel (Targretin) and triamcinolone cream as needed.       # Concern for contact dermatitis  Biparietal location, now resolved. Appears to correlate to exposure to headphones.   - Restrict contact with irritant    # History of SCC  - NERD      Follow-up: 6 month(s) in-person, or earlier for new or changing lesions      Procedures Performed:   None  None    Follow-up: 6 month(s) in-person, or earlier for new or changing lesions    Staff and Resident:    Leopoldo Lozano  Internal Medicine, PGY-3  [facility]  I, Arlen Guillory MD, saw this patient with the resident and agree with the resident s findings and plan of care as documented in the resident s note.    ____________________________________________    CC: Skin Check    HPI:  Ms. Lyndsey Hagen is a 71-year-old woman with history of CTCL who returns to clinic for a follow-up visit.  She was last seen in January 2022 in clinic. She reports a history of itchy, erythematous,  non-draining hand-sized patch on her right abdominal flank for 6-8 weeks that responded well to treatment with bexarotene.    She also mentions itching and irritation to her bitemporal scalp, which she says only appears after she wears Elina headphones.    Otherwise, she has been relatively free of dermatologic symptoms or concerns..      She is re-establishing care with Dr Garcia in Internal Medicine and was seen for back pain in 03/2022 and was offered cyclobenzaprine as needed. She is on a statin.      Labs Reviewed:  N/A    Physical Exam:  Vitals: There were no vitals taken for this visit.  SKIN: Full skin, which includes the head/face, both arms, chest, back, abdomen,both legs, buttocks, digits and/or nails, was examined.  - freckles observed in upper back  - hyperpigmented sequelae on R abdomen  - There is no erythema, telangectasias, nodularity, or pigmentation on the previous skin cancer site.  - No other lesions of concern on areas examined.     Medications:  Current Outpatient Medications   Medication     atorvastatin (LIPITOR) 10 MG tablet     atorvastatin (LIPITOR) 10 MG tablet     betamethasone dipropionate (DIPROSONE) 0.05 % external lotion     Bexarotene (TARGRETIN) 1 % GEL     Cholecalciferol (VITAMIN D-3 PO)     Coenzyme Q10 (CO Q-10 PO)     cyclobenzaprine (FLEXERIL) 5 MG tablet     Multiple Vitamin (MULTIVITAMINS PO)     Probiotic Product (SOLUBLE FIBER/PROBIOTICS PO)     triamcinolone (KENALOG) 0.1 % cream     Current Facility-Administered Medications   Medication     lidocaine 1% with EPINEPHrine 1:100,000 injection 3 mL      Past Medical History:   Patient Active Problem List   Diagnosis     Cutaneous T-cell lymphoma (H)     Past Medical History:   Diagnosis Date     Cutaneous T-cell lymphoma (H)        CC Referred Self, MD  No address on file on close of this encounter.

## 2022-07-11 NOTE — LETTER
7/11/2022       RE: Lyndsey Hagen  [address]  [place] [address]     Dear Colleague,    Thank you for referring your patient, Lyndsey Hagen, to the [facility] DERMATOLOG[facility] [place] at [facility]. Please see a copy of my visit note below.    [facility] Dermatology Note  Encounter Date: Jul 11, 2022  Office Visit     Dermatology Problem List:  1. CTCL, stage IIB  - Previously treated with PUVA with good response. Treated at [place] prior to establishing care here. Stopped due to difficulty percuring oxsoralen and risks associated with PUVA.  Restarted on UVA1 with clinical remission.  Discontinued UVA1 3/12/2018  -Temporarily discontinued UVA1 (4/26/17) due to insurance  -Targretin 1% gel (added on 5/23/16) with good response.   -Triamcinolone 0.1% cream as needed for irritation from targretin gel.     2. Lesion to monitor, left superior parietal scalp Photo 8/27/2018     3. 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 Androgenic alopecia- LLLT 3x/week     ____________________________________________    Assessment & Plan:     # CTCL - in clinical remission  Recent hand-sized patch in R abdomen possibly with component of dermatitis, that responded well to bexarotene.  - Continue current treatment with bexarotene gel (Targretin) and triamcinolone cream as needed.       # Concern for contact dermatitis  Biparietal location, now resolved. Appears to correlate to exposure to headphones.   - Restrict contact with irritant    # History of SCC  - NERD      Follow-up: 6 month(s) in-person, or earlier for new or changing lesions      Procedures Performed:   None  None    Follow-up: 6 month(s) in-person, or earlier for new or changing lesions    Staff and Resident:    Leopoldo Lozano  Internal Medicine, PGY-3  [facility]  I, Arlen Guillory MD, saw this patient with the resident and agree with the resident s findings and plan of care as  documented in the resident s note.    ____________________________________________    CC: Skin Check    HPI:  Ms. Lyndsey Hagen is a 71-year-old woman with history of CTCL who returns to clinic for a follow-up visit.  She was last seen in January 2022 in clinic. She reports a history of itchy, erythematous, non-draining hand-sized patch on her right abdominal flank for 6-8 weeks that responded well to treatment with bexarotene.    She also mentions itching and irritation to her bitemporal scalp, which she says only appears after she wears Elina headphones.    Otherwise, she has been relatively free of dermatologic symptoms or concerns..      She is re-establishing care with Dr Garcia in Internal Medicine and was seen for back pain in 03/2022 and was offered cyclobenzaprine as needed. She is on a statin.      Labs Reviewed:  N/A    Physical Exam:  Vitals: There were no vitals taken for this visit.  SKIN: Full skin, which includes the head/face, both arms, chest, back, abdomen,both legs, buttocks, digits and/or nails, was examined.  - freckles observed in upper back  - hyperpigmented sequelae on R abdomen  - There is no erythema, telangectasias, nodularity, or pigmentation on the previous skin cancer site.  - No other lesions of concern on areas examined.     Medications:  Current Outpatient Medications   Medication     atorvastatin (LIPITOR) 10 MG tablet     atorvastatin (LIPITOR) 10 MG tablet     betamethasone dipropionate (DIPROSONE) 0.05 % external lotion     Bexarotene (TARGRETIN) 1 % GEL     Cholecalciferol (VITAMIN D-3 PO)     Coenzyme Q10 (CO Q-10 PO)     cyclobenzaprine (FLEXERIL) 5 MG tablet     Multiple Vitamin (MULTIVITAMINS PO)     Probiotic Product (SOLUBLE FIBER/PROBIOTICS PO)     triamcinolone (KENALOG) 0.1 % cream     Current Facility-Administered Medications   Medication     lidocaine 1% with EPINEPHrine 1:100,000 injection 3 mL      Past Medical History:   Patient Active Problem List   Diagnosis      Cutaneous T-cell lymphoma (H)     Past Medical History:   Diagnosis Date     Cutaneous T-cell lymphoma (H)        CC Referred Self, MD  No address on file on close of this encounter.

## 2022-08-24 ENCOUNTER — MYC MEDICAL ADVICE (OUTPATIENT)
Dept: DERMATOLOGY | Facility: CLINIC | Age: 72
End: 2022-08-24

## 2022-08-24 NOTE — TELEPHONE ENCOUNTER
Called and verified patient by last name and . Offered patient appointment tomorrow. Patient asked me if I thought spot was concerning enough for her to have to come in. Informed patient I was unsure but that I can forward her message to Dr. Guillory to advise. Patient opted to wait to see what Dr. Guillory's thoughts were before scheduling appointment. Routed to Dr. Guillory.    Dagoberto Burns, EMT

## 2022-08-25 NOTE — TELEPHONE ENCOUNTER
Arlen Guillory MD  [facility] 39 minutes ago (7:42 AM)     KB    We should have her come in for a visit.  Can use [facility] or [facility]    Message text

## 2022-08-25 NOTE — TELEPHONE ENCOUNTER
Called and verified patient by last name and . Patient scheduled for 2022 with Dr. Rose.    Dagoberto Burns, EMT

## 2022-08-29 NOTE — PROGRESS NOTES
[facility] Dermatology Note  Encounter Date: Aug 30, 2022  Office Visit     Dermatology Problem List:  1. CTCL, stage IIB  - Previously treated with PUVA with good response. Treated at [place] prior to establishing care here. Stopped due to difficulty percuring oxsoralen and risks associated with PUVA.  Restarted on UVA1 with clinical remission.  Discontinued UVA1 3/12/2018  -Temporarily discontinued UVA1 (4/26/17) due to insurance  -Targretin 1% gel (added on 5/23/16) with good response.   -Triamcinolone 0.1% cream as needed for irritation from targretin gel.  2. Lesion to monitor, left superior parietal scalp Photo 8/27/2018  3. 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 Androgenic alopecia- LLLT 3x/week  5. ISK. LiqN2.     ____________________________________________    Assessment & Plan:    # Inflamed seborrheic keratosis, L forearm x 1.   - Cryotherapy as below.   - Follow-up for FBSE in November 2022    Procedures Performed:   - Cryotherapy procedure note, location(s): left forearm. After verbal consent and discussion of risks and benefits including, but not limited to, dyspigmentation/scar, blister, and pain, 1 inflamed SK lesion(s) was(were) treated with 1-2 mm freeze border for 1-2 cycles with liquid nitrogen. Post cryotherapy instructions were provided.    Follow-up: as planned in November 2022 for FBSE.     Staff and Scribe:     Scribe Disclosure:  I, Girish Estrada, am serving as a scribe to document services personally performed by Andrea Rose MD based on data collection and the provider's statements to me.     Provider Disclosure:   The documentation recorded by the scribe accurately reflects the services I personally performed and the decisions made by me.    Andrea Rose MD    Department of Dermatology  [facility] Clin[facility]: Phone: [phone], Fax:[phone]  [facility]  Surgery Center: Phone: [phone] Fax: [phone]  ____________________________________________    CC: Derm Problem (Lyndsey is here for a spot on her left fore arm.  Comes and goes.  No itch, no pain.)    HPI:  Ms. Lyndsey Hagen is a(n) 71 year old female who presents today as a return patient for spot check on the left forearm. Last seen in dermatology by Dr. Guillory on 7/11/2022, at which time patient was continued on bexarotene gel and triamcinolone cream prn for treatment of CTCL.    Today, she reports a lesion of concern on the left forearm. States it comes and goes, but recently got raised, red and scaly. Sent a [employer] message to Dr. Guillory would recommended in-person evaluation. She states the lesion has improved in the last week.     Patient is otherwise feeling well, without additional skin concerns.    Labs Reviewed:  N/A    Physical Exam:  Vitals: There were no vitals taken for this visit.  SKIN: Focused examination of left forearm was performed.  - Well-demarcated light brown to pink scaly papule on the left forearm.   - No other lesions of concern on areas examined.     Medications:  Current Outpatient Medications   Medication     atorvastatin (LIPITOR) 10 MG tablet     atorvastatin (LIPITOR) 10 MG tablet     betamethasone dipropionate (DIPROSONE) 0.05 % external lotion     Bexarotene (TARGRETIN) 1 % GEL     Cholecalciferol (VITAMIN D-3 PO)     Coenzyme Q10 (CO Q-10 PO)     cyclobenzaprine (FLEXERIL) 5 MG tablet     Multiple Vitamin (MULTIVITAMINS PO)     Probiotic Product (SOLUBLE FIBER/PROBIOTICS PO)     triamcinolone (KENALOG) 0.1 % cream     Current Facility-Administered Medications   Medication     lidocaine 1% with EPINEPHrine 1:100,000 injection 3 mL      Past Medical History:   Patient Active Problem List   Diagnosis     Cutaneous T-cell lymphoma (H)     Past Medical History:   Diagnosis Date     Cutaneous T-cell lymphoma (H)

## 2022-08-30 ENCOUNTER — OFFICE VISIT (OUTPATIENT)
Dept: DERMATOLOGY | Facility: CLINIC | Age: 72
End: 2022-08-30
Payer: COMMERCIAL

## 2022-08-30 DIAGNOSIS — L82.0 INFLAMED SEBORRHEIC KERATOSIS: Primary | ICD-10-CM

## 2022-08-30 PROCEDURE — 17110 DESTRUCTION B9 LES UP TO 14: CPT | Performed by: DERMATOLOGY

## 2022-08-30 ASSESSMENT — PAIN SCALES - GENERAL: PAINLEVEL: NO PAIN (0)

## 2022-08-30 NOTE — NURSING NOTE
Dermatology Rooming Note    Lyndsey Hagen's goals for this visit include:   Chief Complaint   Patient presents with     Derm Problem     Lyndsey is here for a spot on her left fore arm.  Comes and goes.  No itch, no pain.     Radha Ortiz, CMA

## 2022-08-30 NOTE — LETTER
8/30/2022       RE: Lyndsey Hagen  [address]  [place] [address]     Dear Colleague,    Thank you for referring your patient, Lyndsey Hagen, to the [facility] DERMATOLOGY CLINIC [place] at [facility]. Please see a copy of my visit note below.    [facility] Dermatology Note  Encounter Date: Aug 30, 2022  Office Visit     Dermatology Problem List:  1. CTCL, stage IIB  - Previously treated with PUVA with good response. Treated at [place] prior to establishing care here. Stopped due to difficulty percuring oxsoralen and risks associated with PUVA.  Restarted on UVA1 with clinical remission.  Discontinued UVA1 3/12/2018  -Temporarily discontinued UVA1 (4/26/17) due to insurance  -Targretin 1% gel (added on 5/23/16) with good response.   -Triamcinolone 0.1% cream as needed for irritation from targretin gel.  2. Lesion to monitor, left superior parietal scalp Photo 8/27/2018  3. 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 Androgenic alopecia- LLLT 3x/week  5. ISK. LiqN2.     ____________________________________________    Assessment & Plan:    # Inflamed seborrheic keratosis, L forearm x 1.   - Cryotherapy as below.   - Follow-up for FBSE in November 2022    Procedures Performed:   - Cryotherapy procedure note, location(s): left forearm. After verbal consent and discussion of risks and benefits including, but not limited to, dyspigmentation/scar, blister, and pain, 1 inflamed SK lesion(s) was(were) treated with 1-2 mm freeze border for 1-2 cycles with liquid nitrogen. Post cryotherapy instructions were provided.    Follow-up: as planned in November 2022 for FBSE.     Staff and Scribe:     Scribe Disclosure:  Girish ANTONIO, am serving as a scribe to document services personally performed by Andrea Rose MD based on data collection and the provider's statements to me.     Provider Disclosure:   The documentation recorded by the scribe  accurately reflects the services I personally performed and the decisions made by me.    Andrea Rose MD    Department of Dermatology  [facility]: Phone: [phone], Fax:[phone]  [facility]: Phone: [phone] Fax: [phone]  ____________________________________________    CC: Derm Problem (Lyndsey is here for a spot on her left fore arm.  Comes and goes.  No itch, no pain.)    HPI:  Ms. Lyndsey Hagen is a(n) 71 year old female who presents today as a return patient for spot check on the left forearm. Last seen in dermatology by Dr. Guillory on 7/11/2022, at which time patient was continued on bexarotene gel and triamcinolone cream prn for treatment of CTCL.    Today, she reports a lesion of concern on the left forearm. States it comes and goes, but recently got raised, red and scaly. Sent a [employer] message to Dr. Guillory would recommended in-person evaluation. She states the lesion has improved in the last week.     Patient is otherwise feeling well, without additional skin concerns.    Labs Reviewed:  N/A    Physical Exam:  Vitals: There were no vitals taken for this visit.  SKIN: Focused examination of left forearm was performed.  - Well-demarcated light brown to pink scaly papule on the left forearm.   - No other lesions of concern on areas examined.     Medications:  Current Outpatient Medications   Medication     atorvastatin (LIPITOR) 10 MG tablet     atorvastatin (LIPITOR) 10 MG tablet     betamethasone dipropionate (DIPROSONE) 0.05 % external lotion     Bexarotene (TARGRETIN) 1 % GEL     Cholecalciferol (VITAMIN D-3 PO)     Coenzyme Q10 (CO Q-10 PO)     cyclobenzaprine (FLEXERIL) 5 MG tablet     Multiple Vitamin (MULTIVITAMINS PO)     Probiotic Product (SOLUBLE FIBER/PROBIOTICS PO)     triamcinolone (KENALOG) 0.1 % cream     Current Facility-Administered Medications    Medication     lidocaine 1% with EPINEPHrine 1:100,000 injection 3 mL      Past Medical History:   Patient Active Problem List   Diagnosis     Cutaneous T-cell lymphoma (H)     Past Medical History:   Diagnosis Date     Cutaneous T-cell lymphoma (H)

## 2022-08-30 NOTE — PATIENT INSTRUCTIONS
Cryotherapy    What is it?  Use of a very cold liquid, such as liquid nitrogen, to freeze and destroy abnormal skin cells that need to be removed    What should I expect?  Tenderness and redness  A small blister that might grow and fill with dark purple blood. There may be crusting.  More than one treatment may be needed if the lesions do not go away.    How do I care for the treated area?  Gently wash the area with your hands when bathing.  Use a thin layer of Vaseline to help with healing. You may use a Band-Aid.   The area should heal within 7-10 days and may leave behind a pink or lighter color.   Do not use an antibiotic or Neosporin ointment.   You may take acetaminophen (Tylenol) for pain.     Call your doctor if you have:  Severe pain  Signs of infection (warmth, redness, cloudy yellow drainage, and or a bad smell)  Questions or concerns    Who should I call with questions?      [facility]: [phone]      [facility]: [phone]      For urgent needs outside of business hours call the [facility] at [phone] and ask for the dermatology resident on call

## 2022-09-01 ASSESSMENT — ACTIVITIES OF DAILY LIVING (ADL)
IN_THE_PAST_7_DAYS,_DID_YOU_NEED_HELP_FROM_OTHERS_TO_TAKE_CARE_OF_THINGS_SUCH_AS_LAUNDRY_AND_HOUSEKEEPING,_BANKING,_SHOPPING,_USING_THE_TELEPHONE,_FOOD_PREPARATION,_TRANSPORTATION,_OR_TAKING_YOUR_OWN_MEDICATIONS?: N
IN_THE_PAST_7_DAYS,_DID_YOU_NEED_HELP_FROM_OTHERS_TO_PERFORM_EVERYDAY_ACTIVITIES_SUCH_AS_EATING,_GETTING_DRESSED,_GROOMING,_BATHING,_WALKING,_OR_USING_THE_TOILET: N

## 2022-09-04 ENCOUNTER — HEALTH MAINTENANCE LETTER (OUTPATIENT)
Age: 72
End: 2022-09-04

## 2022-09-08 ENCOUNTER — LAB (OUTPATIENT)
Dept: LAB | Facility: CLINIC | Age: 72
End: 2022-09-08

## 2022-09-08 ENCOUNTER — OFFICE VISIT (OUTPATIENT)
Dept: INTERNAL MEDICINE | Facility: CLINIC | Age: 72
End: 2022-09-08
Payer: COMMERCIAL

## 2022-09-08 VITALS
WEIGHT: 170.6 LBS | BODY MASS INDEX: 26.78 KG/M2 | SYSTOLIC BLOOD PRESSURE: 105 MMHG | DIASTOLIC BLOOD PRESSURE: 67 MMHG | OXYGEN SATURATION: 95 % | HEIGHT: 67 IN | HEART RATE: 63 BPM | RESPIRATION RATE: 16 BRPM

## 2022-09-08 DIAGNOSIS — H90.8 MIXED CONDUCTIVE AND SENSORINEURAL HEARING LOSS, UNSPECIFIED LATERALITY: ICD-10-CM

## 2022-09-08 DIAGNOSIS — Z00.00 ROUTINE GENERAL MEDICAL EXAMINATION AT A HEALTH CARE FACILITY: Primary | ICD-10-CM

## 2022-09-08 DIAGNOSIS — Z78.0 ASYMPTOMATIC POSTMENOPAUSAL STATE: ICD-10-CM

## 2022-09-08 DIAGNOSIS — E78.00 HIGH CHOLESTEROL: ICD-10-CM

## 2022-09-08 DIAGNOSIS — R73.01 ELEVATED FASTING BLOOD SUGAR: ICD-10-CM

## 2022-09-08 LAB
ALBUMIN SERPL BCG-MCNC: 4.2 G/DL (ref 3.5–5.2)
ALP SERPL-CCNC: 63 U/L (ref 35–104)
ALT SERPL W P-5'-P-CCNC: 22 U/L (ref 10–35)
ANION GAP SERPL CALCULATED.3IONS-SCNC: 10 MMOL/L (ref 7–15)
AST SERPL W P-5'-P-CCNC: 24 U/L (ref 10–35)
BILIRUB SERPL-MCNC: 0.2 MG/DL
BUN SERPL-MCNC: 17.2 MG/DL (ref 8–23)
CALCIUM SERPL-MCNC: 9.2 MG/DL (ref 8.8–10.2)
CHLORIDE SERPL-SCNC: 107 MMOL/L (ref 98–107)
CHOLEST SERPL-MCNC: 184 MG/DL
CREAT SERPL-MCNC: 0.68 MG/DL (ref 0.51–0.95)
DEPRECATED HCO3 PLAS-SCNC: 26 MMOL/L (ref 22–29)
GFR SERPL CREATININE-BSD FRML MDRD: >90 ML/MIN/1.73M2
GLUCOSE SERPL-MCNC: 107 MG/DL (ref 70–99)
HBA1C MFR BLD: 5.9 %
HDLC SERPL-MCNC: 57 MG/DL
LDLC SERPL CALC-MCNC: 105 MG/DL
NONHDLC SERPL-MCNC: 127 MG/DL
POTASSIUM SERPL-SCNC: 4.5 MMOL/L (ref 3.4–5.3)
PROT SERPL-MCNC: 6.7 G/DL (ref 6.4–8.3)
SODIUM SERPL-SCNC: 143 MMOL/L (ref 136–145)
TRIGL SERPL-MCNC: 111 MG/DL

## 2022-09-08 PROCEDURE — 80061 LIPID PANEL: CPT | Performed by: INTERNAL MEDICINE

## 2022-09-08 PROCEDURE — 83036 HEMOGLOBIN GLYCOSYLATED A1C: CPT | Performed by: INTERNAL MEDICINE

## 2022-09-08 PROCEDURE — 36415 COLL VENOUS BLD VENIPUNCTURE: CPT | Performed by: PATHOLOGY

## 2022-09-08 PROCEDURE — 80053 COMPREHEN METABOLIC PANEL: CPT | Performed by: PATHOLOGY

## 2022-09-08 PROCEDURE — 99397 PER PM REEVAL EST PAT 65+ YR: CPT | Performed by: INTERNAL MEDICINE

## 2022-09-08 RX ORDER — ATORVASTATIN CALCIUM 10 MG/1
10 TABLET, FILM COATED ORAL DAILY
Qty: 90 TABLET | Refills: 3 | Status: SHIPPED | OUTPATIENT
Start: 2022-09-08 | End: 2023-04-04

## 2022-09-08 NOTE — PROGRESS NOTES
History of Present Illness:  Ms. Hagen is a 71 year old female who presents for  Chief Complaint   Patient presents with     Physical     Annual Visit     She reports no new health concerns--patient would like to defer the Medicare Wellness Exam.     She unfortunately had COVID in June after going to wedding, wasn't terribly sick, didn't take meds.  She has had 2 boosters.    Back pain resolved after going to chiropractor.  Very active, plays pickle ball, biking.  Mental health is okay.  Lots of good friends, good support.    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  Going to wellness retreat for 72th birthday.  Requests hearing tests.    The 10-year ASCVD risk score (Richard ACOSTA Jr., et al., 2013) is: 5.7%    Values used to calculate the score:      Age: 71 years      Sex: Female      Is Non- : No      Diabetic: No      Tobacco smoker: No      Systolic Blood Pressure: 105 mmHg      Is BP treated: No      HDL Cholesterol: 65 mg/dL      Total Cholesterol: 204 mg/dL      Routine Health Maintenence:  Lung Ca Screening (>20 pk age 50-80): not eligible  Colonoscopy (45-75 yrs): 9/16 normal, rec f/u 10 years  Dexa (>65W or 70M yrs):  Last in 2018.  Mammogram (40-75 yrs): 7/22  Pap (21-65 yrs): no abnormal        Review of external notes as documented above                   A detailed Review of Systems was performed, verified and is negative except as documented in the HPI.  All health questionnaires were reviewed, verified and relevant information documented above.      Travel Screening     Question 9/1/2022  8:42 AM CDT - Filed by Patient    Do you have any of the following new or worsening symptoms? None of these    In the last 10 days, have you been in contact with someone who was confirmed or suspected to have Coronavirus / COVID-19? No / Unsure    Have you had a COVID-19 viral test in the last 10 days? No      p Review Of Systems     Question 9/1/2022  8:43 AM CDT - Filed by Patient     I understand that completing this form is intended to provide my doctor and/or care team with helpful information for my upcoming clinic visit. It is not to notify my doctor and/or care team of medical matters requiring urgent attention. If I have an urgent medical matter, I should call 911 or my doctor's office. Acknowledge    GENERAL HEALTH SYMPTOMS No    SKIN SYMPTOMS Yes    HEAD, EARS, NOSE AND THROAT SYMPTOMS No    EYE SYMPTOMS No    HEART SYMPTOMS No    LUNG SYMPTOMS No    INTESTINAL SYMPTOMS No    URINARY SYMPTOMS No    GYNECOLOGIC SYMPTOMS No    BREAST SYMPTOMS No    SKELETAL SYMPTOMS No    BLOOD SYMPTOMS No    NERVOUS SYSTEM SYMPTOMS No    MENTAL HEALTH SYMPTOMS No      [facility] Skin Symptoms     Question 9/1/2022  8:43 AM CDT - Filed by Patient    Changes in hair       Changes in moles/birth marks       Itching       Rashes       Changes in nails       Acne       Hair in places you don't want it       Change in facial hair       Warts Yes    Non-healing sores       Scarring       Flaking of skin       Color changes of hands/feet in cold        Sun sensitivity       Skin thickening         [facility] (Daily Health Habits )     Question 9/1/2022  8:45 AM CDT - Filed by Patient    Do you have a special diet?  None    Do you have a snack between meals or at bedtime?  Yes    How many servings of fruit do you consume daily? (1 serving = half cup) 3 - 4 Servings    How many servings of vegetables do you consume daily? (1 servings = half cup)  3 - 4 Servings    How many servings of high fiber or whole grain foods do you consume daily? (1 serving = 1 slice of whole wheat bread, 1 cup of whole grain cereal, or   cup brown rice or oatmeal)  0 - 2 Servings    How many servings of fried or high-fat foods do you consume daily? (Examples: fried chicken, potato chips, doughnuts)  0 - 2 servings    How many sugar-sweetened (not diet) beverages do you consume daily?  0 - 2 Servings    Do you get  at least 3 servings of foods that have calcium each day (dairy, green leafy vegetables, etc)?   Yes    Do you take a Vitamin D supplement?  Yes    On average, how many days per week do you engage in moderate to strenuous exercise like a brisk walk? 3    Do you have any problems taking medications regularly?  No    How often is stress a problem for you in handling such things as your health, finances, relationships, or work?  Sometimes    How often do you get the social and emotional support you need?  Usually    In a typical week, how many times do you talk on the phone or get together with friends or family? often    In a typical week, how many times do you attend events like Denominational/Buddhist services, club meetings, or other organizational meetings? often    Do you have sleep apnea, excessive snoring or daytime drowsiness? (select all that apply) No    In the past 7 days, how many days did you drink alcohol? 0    On days when you drank alcohol, how often did you have 4 or more alcoholic drinks on one occasion? Never    Do you ever drive after drinking, or ride with a  who has been drinking?  No    Have you used a smokeless tobacco product? No    Have you smoked 100 cigarettes or more in your entire life? Yes    Do you always fasten your seat belt when you are in a car?  Yes    Annual wellness visits     In general, would you say your health is: Very good    How would you describe the condition of your mouth and teeth - including false teeth or dentures? Excellent    In the past 7 days, did you need help from others to perform everyday activities such as eating, getting dressed, grooming, bathing, walking, or using the toilet?  No    In the past 7 days, did you need help from others to take care of things such as laundry and housekeeping, banking, shopping, using the telephone, food preparation, transportation, or taking your own medications?  No      Exercise Activity     Question 9/1/2022  8:45 AM CDT -  Filed by Patient    How intense is your typical exercise? Moderate (like brisk walking)    On average how many minutes do you engage in exercise at this level? 50    Minutes per week doing moderate to strenuous exercise: (range: 0 - 95796) 0      [facility] Smoking Follow Up     Question 9/1/2022  8:45 AM CDT - Filed by Patient    How often do you smoke cigarettes? Not at all      [facility] Menstrual Hx     Question 9/1/2022  8:45 AM CDT - Filed by Patient    Are you currently having periods? No      [facility] Menstrual Hx No Periods     Question 9/1/2022  8:45 AM CDT - Filed by Patient    Please select any of the following: Postmenopausal            Past Medical History:  Past Medical History:   Diagnosis Date     Cutaneous T-cell lymphoma (H)        Past Surgical History:  Past Surgical History:   Procedure Laterality Date     NO HISTORY OF SURGERY  10/28/31    derm       Active Meds:  Current Outpatient Medications   Medication     atorvastatin (LIPITOR) 10 MG tablet     betamethasone dipropionate (DIPROSONE) 0.05 % external lotion     Bexarotene (TARGRETIN) 1 % GEL     Cholecalciferol (VITAMIN D-3 PO)     Coenzyme Q10 (CO Q-10 PO)     cyclobenzaprine (FLEXERIL) 5 MG tablet     Multiple Vitamin (MULTIVITAMINS PO)     Probiotic Product (SOLUBLE FIBER/PROBIOTICS PO)     triamcinolone (KENALOG) 0.1 % cream     atorvastatin (LIPITOR) 10 MG tablet     Current Facility-Administered Medications   Medication     lidocaine 1% with EPINEPHrine 1:100,000 injection 3 mL        Allergies:  Nkda [no known drug allergies]    Family History:  family history includes Aneurysm in her brother; Cancer in her mother; Hypertension in her sister; Other - See Comments in her mother and sister; Pneumonia in her father; Skin Cancer in her mother and sister; Spine Problems in her brother and father; Suicidality in her sister.    Social History:  Social History     Tobacco Use     Smoking status: Former Smoker     Packs/day: 0.30      "Years: 40.00     Pack years: 12.00     Types: Cigarettes     Smokeless tobacco: Never Used       Physical Exam:  Vitals: /67 (BP Location: Right arm, Patient Position: Sitting, Cuff Size: Adult Regular)   Pulse 63   Resp 16   Ht 1.702 m (5' 7\")   Wt 77.4 kg (170 lb 9.6 oz)   SpO2 95%   Breastfeeding No   BMI 26.72 kg/m    Constitutional: Alert, oriented, pleasant, no acute distress  Head: Normocephalic, atraumatic  Eyes: Extra-ocular movements intact, pupils equally round and reactive bilaterally, no scleral icterus  ENT: Oropharynx clear, moist mucus membranes, good dentition  Neck: Supple, no lymphadenopathy  Cardiovascular: Regular rate and rhythm, no murmurs, rubs or gallops, peripheral pulses full/symmetric  Respiratory: Good air movement bilaterally, lungs clear, no wheezes/rales/rhonchi  GI: Abdomen soft, bowel sounds present, nondistended, nontender, no organomegaly or masses, no rebound/guarding  Musculoskeletal: No edema, normal muscle tone, normal gait  Neurologic: Alert and oriented, cranial nerves 2-12 intact, grossly non-focal  Skin: No rashes/lesions  Psychiatric: normal mentation, affect and mood      Diagnostics:  Labs reviewed in Epic          Assessment and Plan:  Lyndsey was seen today for physical and annual visit.    Diagnoses and all orders for this visit:    Routine Preventive Exam  Routine health care reviewed and updated as appropriate.    Mixed conductive and sensorineural hearing loss, unspecified laterality  -     Adult Audiology  Referral; Future    High cholesterol  -     Lipid panel reflex to direct LDL Fasting; Future  -     Comprehensive metabolic panel; Future  -     atorvastatin (LIPITOR) 10 MG tablet; Take 1 tablet (10 mg) by mouth daily    Elevated fasting blood sugar  -     Hemoglobin A1c; Future    Asymptomatic postmenopausal state  -     Dexa hip/pelvis/spine*; Future        Lyndsey Garcia MD  Internal Medicine                    Answers for HPI/ROS " submitted by the patient on 9/1/2022  General Symptoms: No  Skin Symptoms: Yes  HENT Symptoms: No  EYE SYMPTOMS: No  HEART SYMPTOMS: No  LUNG SYMPTOMS: No  INTESTINAL SYMPTOMS: No  URINARY SYMPTOMS: No  GYNECOLOGIC SYMPTOMS: No  BREAST SYMPTOMS: No  SKELETAL SYMPTOMS: No  BLOOD SYMPTOMS: No  NERVOUS SYSTEM SYMPTOMS: No  MENTAL HEALTH SYMPTOMS: No  Warts: Yes

## 2022-09-08 NOTE — NURSING NOTE
"Lyndsey Hagen is a 71 year old female patient that presents today in clinic for the following:    Chief Complaint   Patient presents with     Physical     Annual Visit     She reports no new health concerns--patient would like to defer the Medicare Wellness Exam.     The patient's allergies and medications were reviewed as noted. A set of vitals were recorded as noted without incident: /67 (BP Location: Right arm, Patient Position: Sitting, Cuff Size: Adult Regular)   Pulse 63   Resp 16   Ht 1.702 m (5' 7\")   Wt 77.4 kg (170 lb 9.6 oz)   SpO2 95%   Breastfeeding No   BMI 26.72 kg/m  . The patient does not have any other questions for the provider.    Jovan Beauchamp, EMT at 9:00 AM on 9/8/2022  "

## 2022-09-13 ENCOUNTER — ANCILLARY PROCEDURE (OUTPATIENT)
Dept: BONE DENSITY | Facility: CLINIC | Age: 72
End: 2022-09-13
Attending: INTERNAL MEDICINE
Payer: COMMERCIAL

## 2022-09-13 DIAGNOSIS — Z78.0 ASYMPTOMATIC POSTMENOPAUSAL STATE: ICD-10-CM

## 2022-09-13 PROCEDURE — 77080 DXA BONE DENSITY AXIAL: CPT | Performed by: INTERNAL MEDICINE

## 2022-10-27 ENCOUNTER — E-VISIT (OUTPATIENT)
Dept: INTERNAL MEDICINE | Facility: CLINIC | Age: 72
End: 2022-10-27
Payer: COMMERCIAL

## 2022-10-27 DIAGNOSIS — R22.9 LOCALIZED SUPERFICIAL SWELLING, MASS, OR LUMP: Primary | ICD-10-CM

## 2022-10-27 PROCEDURE — 99207 PR NON-BILLABLE SERV PER CHARTING: CPT | Performed by: INTERNAL MEDICINE

## 2022-10-28 NOTE — PATIENT INSTRUCTIONS
Thank you for choosing us for your care. I think an in-clinic visit would be best next steps based on your symptoms. Please schedule a clinic appointment; you won t be charged for this eVisit.      You can schedule an appointment right here in [facility], or call [phone]

## 2022-11-06 NOTE — PROGRESS NOTES
AUDIOLOGY REPORT    SUBJECTIVE:  Lyndsey Hagen is a 72 year old female who was seen on 11/30/22 in the Audiology Clinic at the [facility] for audiologic evaluation, referred by Lyndsey Garcia M.D. The patient reports concerns about hearing her sons on the phone and about increasing the TV volume.  The patient denies aural fullness, drainage from ears, ear pain, tinnitus, dizziness, history of ear surgery, or history of noise exposure.      OBJECTIVE:  Abuse Screening:  Do you feel unsafe at home or work/school? No  Do you feel threatened by someone? No  Does anyone try to keep you from having contact with others, or doing things outside of your home? No  Physical signs of abuse present? No     Fall Risk Screen:  1. Have you fallen two or more times in the past year? No  2. Have you fallen and had an injury in the past year? No    Timed Up and Go Score (in seconds): not tested  Is patient a fall risk? No  Referral initiated: No  Fall Risk Assessment Completed by Audiology    Otoscopic exam indicates:  Right ear - small amount of non-occluding cerumen  Left ear - clear of cerumen    Pure Tone Thresholds assessed using conventional audiometry with good reliability from 250-8000 Hz bilaterally using insert earphones and circumaural headphones.      RIGHT:  Normal hearing except at 4000 Hz, where there is mild sensorineural hearing loss    LEFT:  Normal hearing sloping to mild/moderate sensorineural hearing loss from 7680-6376 Hz, rising to borderline normal hearing at 8000 Hz  Negative Nisha at 3000 Hz (15-20 dB asymmetry from 7087-0005 Hz, where left ear is poorer than right ear)     Tympanogram:    RIGHT: normal eardrum mobility    LEFT:   normal eardrum mobility    Reflexes (reported by stimulus ear):  RIGHT: Ipsilateral is present at normal levels  RIGHT: Contralateral is absent at frequencies tested  LEFT:   Ipsilateral is present at elevated levels   LEFT:    Contralateral is present at normal levels      Speech Reception Threshold:    RIGHT: 15 dB HL    LEFT:   15 dB HL  Word Recognition Score:     RIGHT: 100% at 60 dB HL using NU-6 recorded word list.    LEFT:   100% at 60 dB HL using NU-6 recorded word list.      ASSESSMENT:   Sensorineural hearing loss bilaterally, asymmetrical (left ear worse than right ear).     Today s results were discussed with the patient in detail.     PLAN:    1. ENT consultation due to asymmetrical hearing loss.   2. Recheck hearing in 1 year or sooner if advised by ENT.    3. Very borderline candidate for left hearing aid if medical clearance from ENT.   4. Provided handout on communication strategies.     The patient expressed understanding and agreement with this plan.    Abbi Mendiola, CCC-A, [facility]  Licensed Audiologist  [place] #[id number]    Enclosure: audiogram    Cc Lyndsey Garcia M.D.

## 2022-11-30 ENCOUNTER — OFFICE VISIT (OUTPATIENT)
Dept: AUDIOLOGY | Facility: CLINIC | Age: 72
End: 2022-11-30
Attending: INTERNAL MEDICINE
Payer: COMMERCIAL

## 2022-11-30 DIAGNOSIS — H90.3 ASYMMETRICAL SENSORINEURAL HEARING LOSS: Primary | ICD-10-CM

## 2022-11-30 DIAGNOSIS — H90.8 MIXED CONDUCTIVE AND SENSORINEURAL HEARING LOSS, UNSPECIFIED LATERALITY: ICD-10-CM

## 2022-11-30 PROCEDURE — 92557 COMPREHENSIVE HEARING TEST: CPT | Performed by: AUDIOLOGIST-HEARING AID FITTER

## 2022-11-30 PROCEDURE — 92550 TYMPANOMETRY & REFLEX THRESH: CPT | Performed by: AUDIOLOGIST-HEARING AID FITTER

## 2022-11-30 PROCEDURE — 92565 STENGER TEST PURE TONE: CPT | Performed by: AUDIOLOGIST-HEARING AID FITTER

## 2022-11-30 NOTE — TELEPHONE ENCOUNTER
FUTURE VISIT INFORMATION      FUTURE VISIT INFORMATION:    Date: 2/9/23    Time: 9:40am    [place] [facility] [place]:    Referring provider:  Rani Torres AuD    Referring providers clinic:  [facility]    Reason for visit/diagnosis  referred by ENT consult due to asymmetrical hearing loss - audio done 11/30/22    RECORDS REQUESTED FROM:       Clinic name Comments Records Status Imaging Status   [facility] 11/30/22- Audiogram   11/30/22- note from Rani Torres AuD [facility] 9/8/22- note from Lyndsey Garcia MD Epic

## 2023-02-06 NOTE — PROGRESS NOTES
[facility] Dermatology Note  Encounter Date: Feb 8, 2023  Office Visit     Dermatology Problem List:  1. CTCL, stage IIB  - Previously treated with PUVA with good response. Treated at [place] prior to establishing care here. Stopped due to difficulty percuring oxsoralen and risks associated with PUVA.  Restarted on UVA1 with clinical remission.  Discontinued UVA1 3/12/2018  -Temporarily discontinued UVA1 (4/26/17) due to insurance  -Targretin 1% gel (added on 5/23/16) with good response.   -Triamcinolone 0.1% cream as needed for irritation from targretin gel.  2. Lesion to monitor, left superior parietal scalp Photo 8/27/2018  3. 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 Androgenic alopecia- LLLT 3x/week  5. ISK. LiqN2.   6. Solar lentiginosis      - hydroquinone 4% cream, Hydrating B5 cream, glycolic acid wash  7. botox years ago.  ____________________________________________    Assessment & Plan:    # Solar lentiginosis   # Rhytides  - Start [place] hydroquinone 4% cream twice daily for 12 weeks. Reviewed irritated. Reviewed fda statement  - Start glycolic acid wash a few times weekly, can be irritating  - Start Hydrating B5 cream or DEJ cream on top of the hydroquinone   -at follow up start eye cream and nectifirm,r evisit filler  - Recommend using sunscreen daily. Samples of tinted Skinceuticals sunscreen provided today.   - Future: Consider filler at follow up     Procedures Performed:   None.     Follow-up: 3 month(s) in-person, or earlier for new or changing lesions    Staff and Scribe:     Scribe Disclosure:  I, PHAM BUTCHER, am serving as a scribe to document services personally performed by Mary Kerr MD based on data collection and the provider's statements to me.     Provider Disclosure:   The documentation recorded by the scribe accurately reflects the services I personally performed and the decisions made by me.    Mary Kerr MD    Department of Dermatology  [place]  [facility]: Phone: [phone], Fax:[phone]  [facility]: Phone: [phone], Fax: [phone]    ____________________________________________    CC: Derm Problem (Lyndsey is here for brown spots on her face that she would like to discuss. )    HPI:  Ms. Lyndsey Hagen is a(n) 72 year old female who presents today as a new patient for a cosmetic consult. Last seen by Dr. Rose on 8/30/22, at which time the patient underwent cryotherapy for treatment of ISKs.    Today, the patient reports numerous brown spots on her face that bother her. She uses Neutrogena sunscreen spf 50. Has used retinols in the past but is not any longer. Additionally, she would like to discuss treatment options for wrinkles.     Patient is otherwise feeling well, without additional skin concerns.    Labs Reviewed:  N/A    Physical Exam:  Vitals: There were no vitals taken for this visit.  SKIN: Focused examination of the face was performed.  - Rhytidoses on the forehead and cheeks.   - Scattered brown macules on sun exposed areas on the face.   - No other lesions of concern on areas examined.     Medications:  Current Outpatient Medications   Medication     atorvastatin (LIPITOR) 10 MG tablet     atorvastatin (LIPITOR) 10 MG tablet     betamethasone dipropionate (DIPROSONE) 0.05 % external lotion     Bexarotene (TARGRETIN) 1 % GEL     Cholecalciferol (VITAMIN D-3 PO)     Coenzyme Q10 (CO Q-10 PO)     cyclobenzaprine (FLEXERIL) 5 MG tablet     Multiple Vitamin (MULTIVITAMINS PO)     Probiotic Product (SOLUBLE FIBER/PROBIOTICS PO)     triamcinolone (KENALOG) 0.1 % cream     Current Facility-Administered Medications   Medication     lidocaine 1% with EPINEPHrine 1:100,000 injection 3 mL      Past Medical History:   Patient Active Problem List   Diagnosis     Cutaneous T-cell lymphoma (H)     Past Medical History:   Diagnosis Date     Cutaneous T-cell  lymphoma (H)         CC Arlen Guillory MD  [address] [facility]  [place] [address] on close of this encounter.

## 2023-02-08 ENCOUNTER — OFFICE VISIT (OUTPATIENT)
Dept: DERMATOLOGY | Facility: CLINIC | Age: 73
End: 2023-02-08
Attending: DERMATOLOGY
Payer: COMMERCIAL

## 2023-02-08 DIAGNOSIS — L81.4 SOLAR LENTIGINOSIS: Primary | ICD-10-CM

## 2023-02-08 DIAGNOSIS — L98.8 RHYTIDES: ICD-10-CM

## 2023-02-08 PROCEDURE — 99212 OFFICE O/P EST SF 10 MIN: CPT | Performed by: DERMATOLOGY

## 2023-02-08 RX ORDER — HYDROQUINONE 40 MG/G
CREAM TOPICAL
Qty: 45 G | Refills: 0 | COMMUNITY
Start: 2023-02-08 | End: 2023-03-20

## 2023-02-08 ASSESSMENT — PAIN SCALES - GENERAL: PAINLEVEL: NO PAIN (0)

## 2023-02-08 NOTE — NURSING NOTE
Dermatology Rooming Note    Lyndsey Hagen's goals for this visit include:   Chief Complaint   Patient presents with     Derm Problem     Lyndsey is here for brown spots on her face that she would like to discuss.      Charline Horowitz, Visit Facilitator

## 2023-02-08 NOTE — PATIENT INSTRUCTIONS
"Hydroquinone is a cream that lightens skin color:   -stop this medication for a few days if you have irritation, then restart when the irritation resolves. Call the clinic if the irritation does not resolve within a few days or if the reaction is severe   -Use this medication for up to3 months  -This medication should be used with sunscreen, as the sun can darken the skin  -Stop tretinoin, tazorac (tazarotene), differin (adapalene), and any retinols when you are using hydroquinone  -Do not use this product in combination with resorcinol containing skin products  -Do not use this product if you are pregnant or breastfeeding.  -Using this cream when your skin is irritated or using it too long can result in darkening of the skin             Hydroquinone: Patient drug information  Access [employer] for additional drug information, tools, and databases.  Copyright 4893-2376 [employer] All rights reserved.  Contributor Disclosures    (For additional information see \"Hydroquinone: Drug information\")    You must carefully read the \"Consumer Information Use and Disclaimer\" below in order to understand and correctly use this information.    Brand Names: US  AMBI Fade [OTC] [DSC]; Angella; EpiQuin Micro [DSC]; Esoterica Daytime [OTC] [DSC]; Esoterica Facial [OTC]; Esoterica Fade Nighttime [OTC] [DSC]; Esoterica Sensitive Skin [OTC]; Hydroquinone Time Release [DSC]; Melpaque HP; NeoStrata HQ Skin Lightening [OTC] [DSC]; Remergent HQ [DSC]; TL Hydroquinone [DSC]  Brand Names: Irina  Corrector 4 [DSC]; Drula Fade Superforte Medicate [DSC]; Erfa Hydroquinone; Lustra-AF [DSC]; Nuderm Sunfader [DSC]; Seequin 4 IDs [DSC]    What is this drug used for?   It is used to lighten the skin where there are changes in color.  What do I need to tell my doctor BEFORE I take this drug?   If you are allergic to this drug; any part of this drug; or any other drugs, foods, or substances. Tell your doctor about the allergy and what signs " you had.  This drug may interact with other drugs or health problems.  Tell your doctor and pharmacist about all of your drugs (prescription or OTC, natural products, vitamins) and health problems. You must check to make sure that it is safe for you to take this drug with all of your drugs and health problems. Do not start, stop, or change the dose of any drug without checking with your doctor.  What are some things I need to know or do while I take this drug?   Tell all of your health care providers that you take this drug. This includes your doctors, nurses, pharmacists, and dentists.   After stopping this drug, some of the color change may come back.   If you have a sulfite allergy, talk with your doctor.   This drug may cause harm if swallowed. If this drug is swallowed, call a doctor or poison control center right away.   Avoid sun, sunlamps, and tanning beds. Use sunscreen and wear clothing and eyewear that protects you from the sun.   Tell your doctor if you are pregnant, plan on getting pregnant, or are breast-feeding. You will need to talk about the benefits and risks to you and the baby.  What are some side effects that I need to call my doctor about right away?  WARNING/CAUTION: Even though it may be rare, some people may have very bad and sometimes deadly side effects when taking a drug. Tell your doctor or get medical help right away if you have any of the following signs or symptoms that may be related to a very bad side effect:   Signs of an allergic reaction, like rash; hives; itching; red, swollen, blistered, or peeling skin with or without fever; wheezing; tightness in the chest or throat; trouble breathing, swallowing, or talking; unusual hoarseness; or swelling of the mouth, face, lips, tongue, or throat.   Change in color of skin to blue-black.   Blisters or sores.      What are some other side effects of this drug?  All drugs may cause side effects. However, many people have no side effects or  only have minor side effects. Call your doctor or get medical help if any of these side effects or any other side effects bother you or do not go away:   Dry skin.   Stinging.   Redness.   Skin irritation.  These are not all of the side effects that may occur. If you have questions about side effects, call your doctor. Call your doctor for medical advice about side effects.  You may report side effects to your national health agency.  How is this drug best taken?  Use this drug as ordered by your doctor. Read all information given to you. Follow all instructions closely.   Do not take this drug by mouth. Use on your skin only. Keep out of your mouth, nose, and eyes (may burn).   Wash your hands before and after use. If your hand is the treated area, do not wash your hand after use.   Clean affected part before use. Make sure to dry well.   Put a thin layer on the affected skin and rub in gently.   Practice good skin care and avoid the sun.   Do not use coverings (bandages, dressings, make-up) unless told to do so by the doctor.   Do not use on irritated skin.      What do I do if I miss a dose?   Put on a missed dose as soon as you think about it.   If it is close to the time for your next dose, skip the missed dose and go back to your normal time.   Do not put on 2 doses at the same time or extra doses.  How do I store and/or throw out this drug?   Store at room temperature in a dry place. Do not store in a bathroom.   Keep all drugs in a safe place. Keep all drugs out of the reach of children and pets.   Throw away unused or  drugs. Do not flush down a toilet or pour down a drain unless you are told to do so. Check with your pharmacist if you have questions about the best way to throw out drugs. There may be drug take-back programs in your area.      General drug facts   If your symptoms or health problems do not get better or if they become worse, call your doctor.   Do not share your drugs with others and  do not take anyone else's drugs.   Some drugs may have another patient information leaflet. If you have any questions about this drug, please talk with your doctor, nurse, pharmacist, or other health care provider.   If you think there has been an overdose, call your poison control center or get medical care right away. Be ready to tell or show what was taken, how much, and when it happened.  Last Reviewed Date  2020-09-21      Consumer Information Use and Disclaimer  This generalized information is a limited summary of diagnosis, treatment, and/or medication information. It is not meant to be comprehensive and should be used as a tool to help the user understand and/or assess potential diagnostic and treatment options. It does NOT include all information about conditions, treatments, medications, side effects, or risks that may apply to a specific patient. It is not intended to be medical advice or a substitute for the medical advice, diagnosis, or treatment of a health care provider based on the health care provider's examination and assessment of a patient's specific and unique circumstances. Patients must speak with a health care provider for complete information about their health, medical questions, and treatment options, including any risks or benefits regarding use of medications. This information does not endorse any treatments or medications as safe, effective, or approved for treating a specific patient. UpToDate, Inc. and its affiliates disclaim any warranty or liability relating to this information or the use thereof. The use of this information is governed by the Terms of Use, available at https://www.R[employer].com/en/know/clinical-effectiveness-terms.    2022 UpToDate, Inc. and its affiliates and/or licensors. All rights reserved.

## 2023-02-08 NOTE — LETTER
2/8/2023       RE: Lyndsey Hagen  [address]  [facility] [address]     Dear Colleague,    Thank you for referring your patient, Lyndsey Hagen, to the [facility] DERMATOLOGY [facility] [place] at [facility]. Please see a copy of my visit note below.    [facility] Dermatology Note  Encounter Date: Feb 8, 2023  Office Visit     Dermatology Problem List:  1. CTCL, stage IIB  - Previously treated with PUVA with good response. Treated at [place] prior to establishing care here. Stopped due to difficulty percuring oxsoralen and risks associated with PUVA.  Restarted on UVA1 with clinical remission.  Discontinued UVA1 3/12/2018  -Temporarily discontinued UVA1 (4/26/17) due to insurance  -Targretin 1% gel (added on 5/23/16) with good response.   -Triamcinolone 0.1% cream as needed for irritation from targretin gel.  2. Lesion to monitor, left superior parietal scalp Photo 8/27/2018  3. 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 Androgenic alopecia- LLLT 3x/week  5. ISK. LiqN2.   6. Solar lentiginosis      - hydroquinone 4% cream, Hydrating B5 cream, glycolic acid wash  7. botox years ago.  ____________________________________________    Assessment & Plan:    # Solar lentiginosis   # Rhytides  - Start [place] hydroquinone 4% cream twice daily for 12 weeks. Reviewed irritated. Reviewed fda statement  - Start glycolic acid wash a few times weekly, can be irritating  - Start Hydrating B5 cream or DEJ cream on top of the hydroquinone   -at follow up start eye cream and nectifirm,r evisit filler  - Recommend using sunscreen daily. Samples of tinted Skinceuticals sunscreen provided today.   - Future: Consider filler at follow up     Procedures Performed:   None.     Follow-up: 3 month(s) in-person, or earlier for new or changing lesions    Staff and Scribe:     Scribe Disclosure:  PACO, PHAM BUTCHER, am serving as a scribe to document services personally performed  by Mary Kerr MD based on data collection and the provider's statements to me.     Provider Disclosure:   The documentation recorded by the scribe accurately reflects the services I personally performed and the decisions made by me.    Mary Kerr MD    Department of Dermatology  [facility]: Phone: [phone], Fax:[phone]  [facility]: Phone: [phone], Fax: [phone]    ____________________________________________    CC: Derm Problem (Lyndsey is here for brown spots on her face that she would like to discuss. )    HPI:  Ms. Lyndsey Hagen is a(n) 72 year old female who presents today as a new patient for a cosmetic consult. Last seen by Dr. Rose on 8/30/22, at which time the patient underwent cryotherapy for treatment of ISKs.    Today, the patient reports numerous brown spots on her face that bother her. She uses Neutrogena sunscreen spf 50. Has used retinols in the past but is not any longer. Additionally, she would like to discuss treatment options for wrinkles.     Patient is otherwise feeling well, without additional skin concerns.    Labs Reviewed:  N/A    Physical Exam:  Vitals: There were no vitals taken for this visit.  SKIN: Focused examination of the face was performed.  - Rhytidoses on the forehead and cheeks.   - Scattered brown macules on sun exposed areas on the face.   - No other lesions of concern on areas examined.     Medications:  Current Outpatient Medications   Medication     atorvastatin (LIPITOR) 10 MG tablet     atorvastatin (LIPITOR) 10 MG tablet     betamethasone dipropionate (DIPROSONE) 0.05 % external lotion     Bexarotene (TARGRETIN) 1 % GEL     Cholecalciferol (VITAMIN D-3 PO)     Coenzyme Q10 (CO Q-10 PO)     cyclobenzaprine (FLEXERIL) 5 MG tablet     Multiple Vitamin (MULTIVITAMINS PO)     Probiotic Product (SOLUBLE FIBER/PROBIOTICS PO)      triamcinolone (KENALOG) 0.1 % cream     Current Facility-Administered Medications   Medication     lidocaine 1% with EPINEPHrine 1:100,000 injection 3 mL      Past Medical History:   Patient Active Problem List   Diagnosis     Cutaneous T-cell lymphoma (H)     Past Medical History:   Diagnosis Date     Cutaneous T-cell lymphoma (H)         CC Arlen Guillory MD  [address] [place] [address] [address]  [place] [address] on close of this encounter.

## 2023-02-09 ENCOUNTER — PRE VISIT (OUTPATIENT)
Dept: OTOLARYNGOLOGY | Facility: CLINIC | Age: 73
End: 2023-02-09

## 2023-02-21 ENCOUNTER — OFFICE VISIT (OUTPATIENT)
Dept: DERMATOLOGY | Facility: CLINIC | Age: 73
End: 2023-02-21
Payer: COMMERCIAL

## 2023-02-21 DIAGNOSIS — L81.4 LENTIGINES: ICD-10-CM

## 2023-02-21 DIAGNOSIS — D22.9 MULTIPLE BENIGN NEVI: ICD-10-CM

## 2023-02-21 DIAGNOSIS — L82.1 SEBORRHEIC KERATOSES: Primary | ICD-10-CM

## 2023-02-21 DIAGNOSIS — C84.A0 CUTANEOUS T-CELL LYMPHOMA, UNSPECIFIED BODY REGION (H): ICD-10-CM

## 2023-02-21 DIAGNOSIS — D18.01 CHERRY ANGIOMA: ICD-10-CM

## 2023-02-21 DIAGNOSIS — C84.00 MYCOSIS FUNGOIDES, UNSPECIFIED BODY REGION (H): ICD-10-CM

## 2023-02-21 PROCEDURE — 99213 OFFICE O/P EST LOW 20 MIN: CPT | Mod: GC | Performed by: DERMATOLOGY

## 2023-02-21 RX ORDER — TRIAMCINOLONE ACETONIDE 1 MG/G
CREAM TOPICAL
Qty: 454 G | Refills: 5 | Status: SHIPPED | OUTPATIENT
Start: 2023-02-21

## 2023-02-21 ASSESSMENT — PAIN SCALES - GENERAL: PAINLEVEL: NO PAIN (0)

## 2023-02-21 NOTE — PATIENT INSTRUCTIONS
- Continue to use targretin gel for the spot on your right abdomen and the spot on your right elbow. If you notice more itching, use more of the triamcinolone (in the jar). You can also try alternating if you'd like, for example, use bexarotene gel in the morning and triamcinolone cream in the evening, or vice versa.     ----------------------------------------------------------------------       Patient Education     Checking for Skin Cancer  You can find cancer early by checking your skin each month. There are 3 kinds of skin cancer. They are melanoma, basal cell carcinoma, and squamous cell carcinoma. Doing monthly skin checks is the best way to find new marks or skin changes. Follow the instructions below for checking your skin.   The ABCDEs of checking moles for melanoma   Check your moles or growths for signs of melanoma using ABCDE:   Asymmetry: the sides of the mole or growth don t match  Border: the edges are ragged, notched, or blurred  Color: the color within the mole or growth varies  Diameter: the mole or growth is larger than 6 mm (size of a pencil eraser)  Evolving: the size, shape, or color of the mole or growth is changing (evolving is not shown in the images below)    Checking for other types of skin cancer  Basal cell carcinoma or squamous cell carcinoma have symptoms such as:     A spot or mole that looks different from all other marks on your skin  Changes in how an area feels, such as itching, tenderness, or pain  Changes in the skin's surface, such as oozing, bleeding, or scaliness  A sore that does not heal  New swelling or redness beyond the border of a mole    Who s at risk?  Anyone can get skin cancer. But you are at greater risk if you have:   Fair skin, light-colored hair, or light-colored eyes  Many moles or abnormal moles on your skin  A history of sunburns from sunlight or tanning beds  A family history of skin cancer  A history of exposure to radiation or chemicals  A weakened immune  system  If you have had skin cancer in the past, you are at risk for recurring skin cancer.   How to check your skin  Do your monthly skin checkups in front of a full-length mirror. Check all parts of your body, including your:   Head (ears, face, neck, and scalp)  Torso (front, back, and sides)  Arms (tops, undersides, upper, and lower armpits)  Hands (palms, backs, and fingers, including under the nails)  Buttocks and genitals  Legs (front, back, and sides)  Feet (tops, soles, toes, including under the nails, and between toes)  If you have a lot of moles, take digital photos of them each month. Make sure to take photos both up close and from a distance. These can help you see if any moles change over time.   Most skin changes are not cancer. But if you see any changes in your skin, call your doctor right away. Only he or she can diagnose a problem. If you have skin cancer, seeing your doctor can be the first step toward getting the treatment that could save your life.   [employer] last reviewed this educational content on 4/1/2019 2000-2020 [employer]. [address] [place] [address]. All rights reserved. This information is not intended as a substitute for professional medical care. Always follow your healthcare professional's instructions.       When should I call my doctor?  If you are worsening or not improving, please, contact us or seek urgent care as noted below.     Who should I call with questions (adults)?  [facility] (adult and pediatric): [phone]  [facility] (adult): [phone]  For urgent needs outside of business hours call the [facility] at [phone] and ask for the dermatology resident on call to be paged  If this is a medical emergency and you are unable to reach an ER, Call [phone]    Who should I call with questions (pediatric)?  [facility]- Pediatric Dermatology  Dr. Ko  Beata, Dr. Cheko Felton, Dr. Tamiko Merlos, WEI Hong, Dr. Alana Leone, Dr. Anu Vincent & Dr. Leopoldo Richardson  Non-urgent nurse triage line; [phone]- Judy and Tosha RN Care Coordinatormara Rader (/Complex ) [phone]    If you need a prescription refill, please contact your pharmacy. Refills are approved or denied by our Physicians during normal business hours, Monday through Fridays  Per office policy, refills will not be granted if you have not been seen within the past year (or sooner depending on your child's condition)    Scheduling Information:  Pediatric Appointment Scheduling and Call Center [phone]  Radiology Scheduling- [phone]  Sedation Unit Scheduling- [phone]  [place] Scheduling- General [phone]; Pediatric Dermatology [phone]  Main  Services: [phone]  Azeri: [phone]  Argentine: [phone]  Hmong/Denzel/Swedish: [phone]  Preadmission Nursing Department Fax Number: [phone] (Fax all pre-operative paperwork to this number)    For urgent matters arising during evenings, weekends, or holidays that cannot wait for normal business hours please call [phone] and ask for the dermatology resident on call to be paged.

## 2023-02-21 NOTE — PROGRESS NOTES
[facility] Dermatology Note  Encounter Date: Feb 21, 2023  Office Visit     Dermatology Problem List:  *Increased risk of cutaneous malignancy 2/2 PUVA*  Last FBSE 2/21/23.   # CTCL, stage IIB  - Current tx: Bexarotene 1% gel (added on 5/23/16) with good response. Triamcinolone 0.1% cream as needed for irritation from bexarotene gel.  - Prior tx: PUVA with good response. Eventually transitioned to UVA1 with good response. Discontinued 3/12/2018.  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 LiqN2.   # Solar lentiginosis      - hydroquinone 4% cream, Hydrating B5 cream, glycolic acid wash  # botox years ago.    ____________________________________________    Assessment & Plan:   # CTCL, stage IIB   - Overall well-controlled with one persistent patch on R abdomen for which she just restarted applying bexarotene gel. Discussed can continue to apply bexarotene gel to this spot and the one on her elbow. If more pruritic, can use triamcinolone for a few days or alternate between the two.    # Seborrheic keratoses, cherry angiomas, multiple benign nevi, lentigines  - Reassured benign etiology.  - No treatment required today.   - Recommended diligent sun protection with SPF 30 or higher. Handout provided.  - Discussed signs and symptoms of skin cancers and ABCDEs of melanoma.    Procedures Performed:   None      Follow-up: 6 months for CTCL.     Staff and Resident:     Resident:  I saw and discussed the patient with the attending physician, Dr. Guillory.     Roshan Spicer MD, PhD  PGY-2 Dermatology Resident   I, Arlen Guillory MD, saw this patient with the resident and agree with the resident s findings and plan of care as documented in the resident s note.    ____________________________________________    CC: No chief complaint on file.      HPI:  MsAbdiaziz Hagen is a(n) 72 year old  female who presents today as a return patient for CTCL f/u and skin check.     - Last seen for CTCL in Dr. Guillory's clinic 7/11/22. At that time, had a hand-sized patch on R abdomen but otherwise doing well; continued treatment with bexarotene gel and triamcinolone cream.   - Patch on R abdomen initially improved quite a bit with bexarotene gel, though recently went on vacation from Jan-mid Feb and forgot bexarotene - patch increased in size and is now itchy. Just recently started using bexarotene again with some benefit.   - As of yesterday, noticed another small rough, pink patch on R elbow.     Patient is otherwise feeling well, without additional skin concerns.    Labs Reviewed:  N/A    Physical Exam:  Vitals: There were no vitals taken for this visit.  SKIN: Full skin, which includes the head/face, both arms, chest, back, abdomen,both legs, genitalia and/or groin buttocks, digits and/or nails, was examined.  - ~7cm poorly marginated pink patch with overlying xerotic scale.  - ~2cm poorly marginated, rough pink patch on R proximal forearm.   - Waxy stuck on tan to brown papules on the trunk and extremities.   - Dome shaped bright red papules on the trunk and extremities.   - There are fine lines and dyspigmentation on sun exposed areas of the face and chest.  - Scattered brown macules on sun exposed areas.  - Light to dark brown symmetric macules and papules with normal pigment networks on dermoscopy on the trunk and extremities.  - No other lesions of concern on areas examined.               Medications:  Current Outpatient Medications   Medication     atorvastatin (LIPITOR) 10 MG tablet     atorvastatin (LIPITOR) 10 MG tablet     betamethasone dipropionate (DIPROSONE) 0.05 % external lotion     Bexarotene (TARGRETIN) 1 % GEL     Cholecalciferol (VITAMIN D-3 PO)     Coenzyme Q10 (CO Q-10 PO)     cyclobenzaprine (FLEXERIL) 5 MG tablet     hydroquinone (DEEPALI) 4 % external cream     Multiple Vitamin  (MULTIVITAMINS PO)     Probiotic Product (SOLUBLE FIBER/PROBIOTICS PO)     triamcinolone (KENALOG) 0.1 % cream     Current Facility-Administered Medications   Medication     lidocaine 1% with EPINEPHrine 1:100,000 injection 3 mL        Past Medical History:   Patient Active Problem List   Diagnosis     Cutaneous T-cell lymphoma (H)     Past Medical History:   Diagnosis Date     Cutaneous T-cell lymphoma (H)        CC Referred Self, MD  No address on file on close of this encounter.

## 2023-02-21 NOTE — NURSING NOTE
Chief Complaint   Patient presents with     Skin Check     Areas of concern include patches on abdomen and R arm     Bob Gonzales, EMT

## 2023-02-21 NOTE — LETTER
2/21/2023       RE: Lyndsey Hagen  [address]  [place] [address]     Dear Colleague,    Thank you for referring your patient, Lyndsey Hagen, to the [facility] DERMATOLOGY [facility]IN[facility] [place] at [facility]. Please see a copy of my visit note below.    [facility] Dermatology Note  Encounter Date: Feb 21, 2023  Office Visit     Dermatology Problem List:  *Increased risk of cutaneous malignancy 2/2 PUVA*  Last FBSE 2/21/23.   # CTCL, stage IIB  - Current tx: Bexarotene 1% gel (added on 5/23/16) with good response. Triamcinolone 0.1% cream as needed for irritation from bexarotene gel.  - Prior tx: PUVA with good response. Eventually transitioned to UVA1 with good response. Discontinued 3/12/2018.  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 LiqN2.   # Solar lentiginosis      - hydroquinone 4% cream, Hydrating B5 cream, glycolic acid wash  # botox years ago.    ____________________________________________    Assessment & Plan:   # CTCL, stage IIB   - Overall well-controlled with one persistent patch on R abdomen for which she just restarted applying bexarotene gel. Discussed can continue to apply bexarotene gel to this spot and the one on her elbow. If more pruritic, can use triamcinolone for a few days or alternate between the two.    # Seborrheic keratoses, cherry angiomas, multiple benign nevi, lentigines  - Reassured benign etiology.  - No treatment required today.   - Recommended diligent sun protection with SPF 30 or higher. Handout provided.  - Discussed signs and symptoms of skin cancers and ABCDEs of melanoma.    Procedures Performed:   None      Follow-up: 6 months for CTCL.     Staff and Resident:     Resident:  I saw and discussed the patient with the attending physician, Dr. Guillory.     Roshan Spicer MD,  PhD  PGY-2 Dermatology Resident   I, Arlen Guillory MD, saw this patient with the resident and agree with the resident s findings and plan of care as documented in the resident s note.    ____________________________________________    CC: No chief complaint on file.      HPI:  Ms. Lyndsey Hagen is a(n) 72 year old female who presents today as a return patient for CTCL f/u and skin check.     - Last seen for CTCL in Dr. Guillory's clinic 7/11/22. At that time, had a hand-sized patch on R abdomen but otherwise doing well; continued treatment with bexarotene gel and triamcinolone cream.   - Patch on R abdomen initially improved quite a bit with bexarotene gel, though recently went on vacation from Jan-mid Feb and forgot bexarotene - patch increased in size and is now itchy. Just recently started using bexarotene again with some benefit.   - As of yesterday, noticed another small rough, pink patch on R elbow.     Patient is otherwise feeling well, without additional skin concerns.    Labs Reviewed:  N/A    Physical Exam:  Vitals: There were no vitals taken for this visit.  SKIN: Full skin, which includes the head/face, both arms, chest, back, abdomen,both legs, genitalia and/or groin buttocks, digits and/or nails, was examined.  - ~7cm poorly marginated pink patch with overlying xerotic scale.  - ~2cm poorly marginated, rough pink patch on R proximal forearm.   - Waxy stuck on tan to brown papules on the trunk and extremities.   - Dome shaped bright red papules on the trunk and extremities.   - There are fine lines and dyspigmentation on sun exposed areas of the face and chest.  - Scattered brown macules on sun exposed areas.  - Light to dark brown symmetric macules and papules with normal pigment networks on dermoscopy on the trunk and extremities.  - No other lesions of concern on areas examined.               Medications:  Current Outpatient Medications   Medication     atorvastatin (LIPITOR) 10 MG tablet      atorvastatin (LIPITOR) 10 MG tablet     betamethasone dipropionate (DIPROSONE) 0.05 % external lotion     Bexarotene (TARGRETIN) 1 % GEL     Cholecalciferol (VITAMIN D-3 PO)     Coenzyme Q10 (CO Q-10 PO)     cyclobenzaprine (FLEXERIL) 5 MG tablet     hydroquinone (DEEPALI) 4 % external cream     Multiple Vitamin (MULTIVITAMINS PO)     Probiotic Product (SOLUBLE FIBER/PROBIOTICS PO)     triamcinolone (KENALOG) 0.1 % cream     Current Facility-Administered Medications   Medication     lidocaine 1% with EPINEPHrine 1:100,000 injection 3 mL        Past Medical History:   Patient Active Problem List   Diagnosis     Cutaneous T-cell lymphoma (H)     Past Medical History:   Diagnosis Date     Cutaneous T-cell lymphoma (H)        CC Referred Self, MD  No address on file on close of this encounter.

## 2023-03-20 ENCOUNTER — MYC REFILL (OUTPATIENT)
Dept: INTERNAL MEDICINE | Facility: CLINIC | Age: 73
End: 2023-03-20
Payer: COMMERCIAL

## 2023-03-20 DIAGNOSIS — L57.8 PHOTOAGING OF SKIN: Primary | ICD-10-CM

## 2023-03-21 NOTE — TELEPHONE ENCOUNTER
hydroquinone (DEEPALI) 4 % external cream  Last Written Prescription Date:  2/8/23  Last Fill Quantity: 45g,   # refills: 0  Last Office Visit :2/8/23  Future Office visit:  5/10/23      Routing refill request to provider for review/approval because: my chart request. Not on protocol. historical

## 2023-03-24 RX ORDER — HYDROQUINONE 40 MG/G
CREAM TOPICAL
Qty: 45 G | Refills: 0 | Status: SHIPPED | OUTPATIENT
Start: 2023-03-24 | End: 2024-07-25

## 2023-04-04 ENCOUNTER — OFFICE VISIT (OUTPATIENT)
Dept: DERMATOLOGY | Facility: CLINIC | Age: 73
End: 2023-04-04
Payer: COMMERCIAL

## 2023-04-04 DIAGNOSIS — C44.729 SQUAMOUS CELL CARCINOMA OF LEFT LOWER LEG: ICD-10-CM

## 2023-04-04 DIAGNOSIS — Z85.828 HX OF SQUAMOUS CELL CARCINOMA OF SKIN: Primary | ICD-10-CM

## 2023-04-04 DIAGNOSIS — D48.5 NEOPLASM OF UNCERTAIN BEHAVIOR OF SKIN: ICD-10-CM

## 2023-04-04 DIAGNOSIS — C84.00 MYCOSIS FUNGOIDES, UNSPECIFIED BODY REGION (H): ICD-10-CM

## 2023-04-04 PROCEDURE — 88305 TISSUE EXAM BY PATHOLOGIST: CPT | Mod: 26 | Performed by: DERMATOLOGY

## 2023-04-04 PROCEDURE — 11102 TANGNTL BX SKIN SINGLE LES: CPT | Performed by: STUDENT IN AN ORGANIZED HEALTH CARE EDUCATION/TRAINING PROGRAM

## 2023-04-04 PROCEDURE — 99213 OFFICE O/P EST LOW 20 MIN: CPT | Mod: 25 | Performed by: STUDENT IN AN ORGANIZED HEALTH CARE EDUCATION/TRAINING PROGRAM

## 2023-04-04 PROCEDURE — 88305 TISSUE EXAM BY PATHOLOGIST: CPT | Mod: TC | Performed by: STUDENT IN AN ORGANIZED HEALTH CARE EDUCATION/TRAINING PROGRAM

## 2023-04-04 ASSESSMENT — PAIN SCALES - GENERAL: PAINLEVEL: NO PAIN (0)

## 2023-04-04 NOTE — PROGRESS NOTES
[facility] Dermatology Note  Encounter Date: Apr 4, 2023  Office Visit     Dermatology Problem List:  1. ***    ____________________________________________    Assessment & Plan:     # ***.  {kkplans:518623}   - ***     # ***.   {kkplans:232567}   - ***     Procedures Performed:   {kkprocedurenotes:531459}  {kkprocedurenotes:446854}    Follow-up: {kkfollowup:867699}    {kkstaffinvolved:942365}    ***  ____________________________________________    CC: Derm Problem (Lyndsey is here today for a lesion of concern on the left leg. )    HPI:  Ms. Lyndsey Hagen is a(n) 72 year old female who presents today {kknew/return:968400} for ***    Patient is otherwise feeling well, without additional skin concerns.    Labs Reviewed:  ***N/A    Physical Exam:  Vitals: There were no vitals taken for this visit.  SKIN: {kkSkinExam:167050}  - ***  - {Skin Exam Derm:475456}.   - {Skin Exam Derm:645454}.   - {Skin Exam Derm:951778}.   - No other lesions of concern on areas examined.     Medications:  Current Outpatient Medications   Medication     atorvastatin (LIPITOR) 10 MG tablet     betamethasone dipropionate (DIPROSONE) 0.05 % external lotion     Bexarotene (TARGRETIN) 1 % GEL     Cholecalciferol (VITAMIN D-3 PO)     Coenzyme Q10 (CO Q-10 PO)     cyclobenzaprine (FLEXERIL) 5 MG tablet     hydroquinone (DEEPALI) 4 % external cream     Multiple Vitamin (MULTIVITAMINS PO)     Probiotic Product (SOLUBLE FIBER/PROBIOTICS PO)     triamcinolone (KENALOG) 0.1 % external cream     atorvastatin (LIPITOR) 10 MG tablet     Current Facility-Administered Medications   Medication     lidocaine 1% with EPINEPHrine 1:100,000 injection 3 mL      Past Medical History:   Patient Active Problem List   Diagnosis     Cutaneous T-cell lymphoma (H)     Past Medical History:   Diagnosis Date     Cutaneous T-cell lymphoma (H)        CC No referring provider defined for this encounter. on close of this encounter.

## 2023-04-04 NOTE — NURSING NOTE
Lidocaine-epinephrine 1-1:715784 % injection   1mL once for one use, starting 4/4/2023 ending 4/4/2023,  2mL disp, R-0, injection  Injected by Carolee ALANIZ CMA

## 2023-04-04 NOTE — PROGRESS NOTES
[facility] Dermatology Note    Encounter Date: Apr 4, 2023    Dermatology Problem List:  Dermatology Problem List:  *Increased risk of cutaneous malignancy 2/2 PUVA*  Last FBSE 2/21/23.   # CTCL, stage IIB  - Current tx: Bexarotene 1% gel (added on 5/23/16) with good response. Triamcinolone 0.1% cream as needed for irritation from bexarotene gel.  - Prior tx: PUVA with good response. Eventually transitioned to UVA1 with good response. Discontinued 3/12/2018.  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 LiqN2.   # Solar lentiginosis      - hydroquinone 4% cream, Hydrating B5 cream, glycolic acid wash  # botox years ago.    Major PMHx  -   ______________________________________    Impression/Plan:  Lyndsey was seen today for derm problem.    Diagnoses and all orders for this visit:    Hx of squamous cell carcinoma of skin  - discussed sunprotective behaviors, clothing, and the use of sunscreen    Neoplasm of uncertain behavior of skin  -     SHAVE 1 [[id number]]  -     Dermatological Path Order and Indications; Standing  -     Dermatological Path Order and Indications  -Left shin tender scaly nodule possibly consistent with squamous cell carcinoma, she has had a squamous of carcinoma on the other leg in the past  - see procedure note    Mycosis fungoides, unspecified body region (H)  - Follows w/ Dr. Guillory  - previously patch on abdomen that was treated with bexarotene gel and medium potency topical steroids  - This regimen is resolved the lesion and it is no longer present  - Follow-up in 6 months as per last lymphoma visit      - SHAVE BIOPSY PROCEDURE NOTE: After written informed consent was obtained, a time out was taken to identify the patient and the correct site for biopsy. The lesion on the L shin was cleansed with a 70% isopropyl alcohol wipe, and then injected with 2cc of lidocaine  1% with epinephrine 1:100,000. Once anesthesia was ensured, the visible surface of the lesion was biopsied using a [place] blade in standard technique. Hemostasis was obtained with pressure and aluminum chloride 20% solution. The specimen was placed in a labeled formalin container and sent to pathology for sectioning and analysis. The wound was dressed with white petrolatum and an adhesive bandage. The patient tolerated the procedure well. Post-procedure instructions and recommendations were provided both verbally and in writing.    Follow-up in w/ Dr. Guillory .       Staff Involved:  Staff Only    Juan Daniel Cat MD   of Dermatology  Department of Dermatology  [facility]      CC:   Chief Complaint   Patient presents with     Derm Problem     Lyndsey is here today for a lesion of concern on the left leg.        History of Present Illness:  Ms. Lyndsey Hagen is a 72 year old female who presents as a return patient.    Presenting today for painful scaly nodule on her left shin which has been present for central several weeks.  It initially popped up and grew rapidly for the first week or 2, but since then the growth has stalled and it might of even shrunk a little bit    Labs:      Physical exam:  Vitals: There were no vitals taken for this visit.  GEN: well developed, well-nourished, in no acute distress, in a pleasant mood.     SKIN: Regalado phototype 1  - Sun-exposed skin, which includes the head/face, neck, both arms, digits, and/or nails legs was examined.   - No obvious evidence of recurrence at site of previous malignancy  - Flat brown macules and patches in a sun exposed areas on face and extremities  -Pink scaly nodule 1 cm in size left shin  - No other lesions of concern on areas examined.     Past Medical History:   Past Medical History:   Diagnosis Date     Cutaneous T-cell lymphoma (H)      Past Surgical History:   Procedure Laterality Date     NO HISTORY  OF SURGERY  10/28/31    derm       Social History:   reports that she quit smoking about 12 years ago. Her smoking use included cigarettes. She has a 12.00 pack-year smoking history. She has never used smokeless tobacco.    Family History:  Family History   Problem Relation Age of Onset     Cancer Mother         skin cancer     Skin Cancer Mother      Other - See Comments Mother         pneumothorax,  age 86     Pneumonia Father          age 60     Spine Problems Father      Suicidality Sister          age 26     Skin Cancer Sister      Hypertension Sister      Other - See Comments Sister         spinal stenosis     Pancreatic Cancer Sister      Aneurysm Brother         AAA ruptured,  age 69     Spine Problems Brother      Melanoma No family hx of      Breast Cancer No family hx of        Medications:  Current Outpatient Medications   Medication Sig Dispense Refill     atorvastatin (LIPITOR) 10 MG tablet TAKE 1 TABLET BY MOUTH EVERY DAY       betamethasone dipropionate (DIPROSONE) 0.05 % external lotion Apply topically 2 times daily 60 mL 11     Bexarotene (TARGRETIN) 1 % GEL APPLY EVERY OTHER NIGHT TO EVERY NIGHT AS DIRECTED 60 g 5     Cholecalciferol (VITAMIN D-3 PO) Take 1 tablet by mouth daily.       Coenzyme Q10 (CO Q-10 PO) Take 1 tablet by mouth daily.       cyclobenzaprine (FLEXERIL) 5 MG tablet Take 1-2 tablets (5-10 mg) by mouth 2 times daily as needed for muscle spasms Don't drive after taking or mix with alcohol 12 tablet 1     hydroquinone (DEEPALI) 4 % external cream Apply twice daily 45 g 0     Multiple Vitamin (MULTIVITAMINS PO) Take  by mouth daily.       Probiotic Product (SOLUBLE FIBER/PROBIOTICS PO) Take 2 tablets by mouth daily.       triamcinolone (KENALOG) 0.1 % external cream Use in morning to rash 454 g 5     Allergies   Allergen Reactions     Nkda [No Known Drug Allergies]

## 2023-04-04 NOTE — PATIENT INSTRUCTIONS

## 2023-04-04 NOTE — LETTER
4/4/2023       RE: Lyndsey Hagen  [address]  [place] [address]     Dear Colleague,    Thank you for referring your patient, Lyndsey Hagen, to the [facility] DERMATOLOGY CLINIC [place] at [facility]. Please see a copy of my visit note below.    [facility] Dermatology Note    Encounter Date: Apr 4, 2023    Dermatology Problem List:  Dermatology Problem List:  *Increased risk of cutaneous malignancy 2/2 PUVA*  Last FBSE 2/21/23.   # CTCL, stage IIB  - Current tx: Bexarotene 1% gel (added on 5/23/16) with good response. Triamcinolone 0.1% cream as needed for irritation from bexarotene gel.  - Prior tx: PUVA with good response. Eventually transitioned to UVA1 with good response. Discontinued 3/12/2018.  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 LiqN2.   # Solar lentiginosis      - hydroquinone 4% cream, Hydrating B5 cream, glycolic acid wash  # botox years ago.    Major PMHx  -   ______________________________________    Impression/Plan:  Lyndsey was seen today for derm problem.    Diagnoses and all orders for this visit:    Hx of squamous cell carcinoma of skin  - discussed sunprotective behaviors, clothing, and the use of sunscreen    Neoplasm of uncertain behavior of skin  -     SHAVE 1 [[id number]]  -     Dermatological Path Order and Indications; Standing  -     Dermatological Path Order and Indications  -Left shin tender scaly nodule possibly consistent with squamous cell carcinoma, she has had a squamous of carcinoma on the other leg in the past  - see procedure note    Mycosis fungoides, unspecified body region (H)  - Follows w/ Dr. Guillory  - previously patch on abdomen that was treated with bexarotene gel and medium potency topical steroids  - This regimen is resolved the lesion and it is no longer present  -  Follow-up in 6 months as per last lymphoma visit      - SHAVE BIOPSY PROCEDURE NOTE: After written informed consent was obtained, a time out was taken to identify the patient and the correct site for biopsy. The lesion on the L shin was cleansed with a 70% isopropyl alcohol wipe, and then injected with 2cc of lidocaine 1% with epinephrine 1:100,000. Once anesthesia was ensured, the visible surface of the lesion was biopsied using a [place] blade in standard technique. Hemostasis was obtained with pressure and aluminum chloride 20% solution. The specimen was placed in a labeled formalin container and sent to pathology for sectioning and analysis. The wound was dressed with white petrolatum and an adhesive bandage. The patient tolerated the procedure well. Post-procedure instructions and recommendations were provided both verbally and in writing.    Follow-up in w/ Dr. Guillory .       Staff Involved:  Staff Only    Juan Daniel Cat MD   of Dermatology  Department of Dermatology  [facility] [employer] [facility]      CC:   Chief Complaint   Patient presents with    Derm Problem     Lyndsey is here today for a lesion of concern on the left leg.        History of Present Illness:  Ms. Lyndsey Hagen is a 72 year old female who presents as a return patient.    Presenting today for painful scaly nodule on her left shin which has been present for central several weeks.  It initially popped up and grew rapidly for the first week or 2, but since then the growth has stalled and it might of even shrunk a little bit    Labs:      Physical exam:  Vitals: There were no vitals taken for this visit.  GEN: well developed, well-nourished, in no acute distress, in a pleasant mood.     SKIN: Regalado phototype 1  - Sun-exposed skin, which includes the head/face, neck, both arms, digits, and/or nails legs was examined.   - No obvious evidence of recurrence at site of previous malignancy  - Flat brown macules and  patches in a sun exposed areas on face and extremities  -Pink scaly nodule 1 cm in size left shin  - No other lesions of concern on areas examined.     Past Medical History:   Past Medical History:   Diagnosis Date    Cutaneous T-cell lymphoma (H)      Past Surgical History:   Procedure Laterality Date    NO HISTORY OF SURGERY  10/28/31    derm       Social History:   reports that she quit smoking about 12 years ago. Her smoking use included cigarettes. She has a 12.00 pack-year smoking history. She has never used smokeless tobacco.    Family History:  Family History   Problem Relation Age of Onset    Cancer Mother         skin cancer    Skin Cancer Mother     Other - See Comments Mother         pneumothorax,  age 86    Pneumonia Father          age 60    Spine Problems Father     Suicidality Sister          age 26    Skin Cancer Sister     Hypertension Sister     Other - See Comments Sister         spinal stenosis    Pancreatic Cancer Sister     Aneurysm Brother         AAA ruptured,  age 69    Spine Problems Brother     Melanoma No family hx of     Breast Cancer No family hx of        Medications:  Current Outpatient Medications   Medication Sig Dispense Refill    atorvastatin (LIPITOR) 10 MG tablet TAKE 1 TABLET BY MOUTH EVERY DAY      betamethasone dipropionate (DIPROSONE) 0.05 % external lotion Apply topically 2 times daily 60 mL 11    Bexarotene (TARGRETIN) 1 % GEL APPLY EVERY OTHER NIGHT TO EVERY NIGHT AS DIRECTED 60 g 5    Cholecalciferol (VITAMIN D-3 PO) Take 1 tablet by mouth daily.      Coenzyme Q10 (CO Q-10 PO) Take 1 tablet by mouth daily.      cyclobenzaprine (FLEXERIL) 5 MG tablet Take 1-2 tablets (5-10 mg) by mouth 2 times daily as needed for muscle spasms Don't drive after taking or mix with alcohol 12 tablet 1    hydroquinone (DEEPALI) 4 % external cream Apply twice daily 45 g 0    Multiple Vitamin (MULTIVITAMINS PO) Take  by mouth daily.      Probiotic Product (SOLUBLE  FIBER/PROBIOTICS PO) Take 2 tablets by mouth daily.      triamcinolone (KENALOG) 0.1 % external cream Use in morning to rash 454 g 5     Allergies   Allergen Reactions    Nkda [No Known Drug Allergies]

## 2023-04-04 NOTE — NURSING NOTE
Dermatology Rooming Note    Lyndsey Hagen's goals for this visit include:   Chief Complaint   Patient presents with     Derm Problem     Lyndsey is here today for a lesion of concern on the left leg.      Carolee ALANIZ, ANGELA

## 2023-04-05 LAB
PATH REPORT.COMMENTS IMP SPEC: ABNORMAL
PATH REPORT.COMMENTS IMP SPEC: ABNORMAL
PATH REPORT.COMMENTS IMP SPEC: YES
PATH REPORT.FINAL DX SPEC: ABNORMAL
PATH REPORT.GROSS SPEC: ABNORMAL
PATH REPORT.MICROSCOPIC SPEC OTHER STN: ABNORMAL
PATH REPORT.RELEVANT HX SPEC: ABNORMAL

## 2023-04-06 ENCOUNTER — TELEPHONE (OUTPATIENT)
Dept: DERMATOLOGY | Facility: CLINIC | Age: 73
End: 2023-04-06
Payer: COMMERCIAL

## 2023-04-06 NOTE — TELEPHONE ENCOUNTER
Called patient to schedule surgery with Dr. Cat    Date of Surgery: 05/01    Surgery type: mohs    Consult scheduled: No    Has patient had mohs with us before? Yes    Additional comments: laura Sharma on 4/6/2023 at 8:30 AM

## 2023-04-07 NOTE — TELEPHONE ENCOUNTER
FUTURE VISIT INFORMATION      FUTURE VISIT INFORMATION:    Date: 5.1.23    Time: 8:30    Location: [facility]  REFERRAL INFORMATION:    Referring provider:  Stephania    Referring providers clinic:  Derm    Reason for visit/diagnosis  SCC L estrella. return pt.    RECORDS REQUESTED FROM:       Clinic name Comments Records Status Imaging Status   Derm 4.4.23  Path # [id number] [facility]

## 2023-05-01 ENCOUNTER — PRE VISIT (OUTPATIENT)
Dept: DERMATOLOGY | Facility: CLINIC | Age: 73
End: 2023-05-01

## 2023-05-01 ENCOUNTER — OFFICE VISIT (OUTPATIENT)
Dept: DERMATOLOGY | Facility: CLINIC | Age: 73
End: 2023-05-01
Payer: COMMERCIAL

## 2023-05-01 VITALS — DIASTOLIC BLOOD PRESSURE: 94 MMHG | SYSTOLIC BLOOD PRESSURE: 147 MMHG | HEART RATE: 88 BPM

## 2023-05-01 DIAGNOSIS — C44.729 SQUAMOUS CELL CARCINOMA OF LEFT LOWER LEG: Primary | ICD-10-CM

## 2023-05-01 PROCEDURE — 17313 MOHS 1 STAGE T/A/L: CPT | Mod: GC | Performed by: DERMATOLOGY

## 2023-05-01 PROCEDURE — 15271 SKIN SUB GRAFT TRNK/ARM/LEG: CPT | Mod: GC | Performed by: DERMATOLOGY

## 2023-05-01 ASSESSMENT — PAIN SCALES - GENERAL: PAINLEVEL: NO PAIN (0)

## 2023-05-01 NOTE — LETTER
5/1/2023       RE: Lyndsey Hagen  [address]  [place] [address]     Dear Colleague,    Thank you for referring your patient, Lyndsey Hagen, to the [facility] DERMATOLOGIC SURGERY CLINIC [place] at [facility]. Please see a copy of my visit note below.    [facility] Mohs Surgery Procedure Note    Case #: 1  Date of Service:  May 1, 2023  Surgery: Mohs micrographic surgery (MMS)  Staff surgeon: Kodi Cat MD  Fellow surgeon: Jasiel Calixto MD  Resident surgeon: Priscila Cartwright MD  Nurse: Soraida Giles CMA    Tumor Type: SCC  Location: left shin  Derm-Path Accession #: [id number]    Mohs Accession #:   Pre-Op Size: 1.3 cm x 0.8 cm  Final Defect Size: 1.7 cm x 1.8 cm  Number of Mohs stages: 1  Level of Defect: Fat  Local anesthetic: 3 mL 1% lidocaine with epinephrine  Repair Type: secondary intent with puracol    Procedure:    Stage I  We discussed the principles of treatment and most likely complications including scarring, bleeding, infection, swelling, pain, crusting, nerve damage, large wound,  incomplete excision, wound dehiscence,  nerve damage, recurrence, and a second procedure may be recommended to obtain the best cosmetic or functional result.    Informed consent was obtained and the patient underwent the procedure as follows:  The patient was placed supine on the operating table.  The cancer was identified, outlined with a marker, and verified by the patient.  The entire surgical field was prepped with chlorhexidine.  The surgical site was anesthetized using lidocaine with epinephrine.    The area of clinically apparent tumor was debulked. The layer of tissue was then surgically excised using a #15 blade and was then transferred onto a specimen sheet maintaining the orientation of the specimen. Hemostasis was obtained using electrocoagulation. The wound site was then covered with a dressing while the tissue samples  were processed for examination.    The excised tissue was transported to the Mohs histology laboratory maintaining the tissue orientation.  The tissue specimen was relaxed so that the entire surgical margin was in a a single horizontal plane for sectioning and inked for precise mapping.  A precise reference map was drawn to reflect the sectioning of the specimen, colored inking of the margins, and orientation on the patient.  The tissue was processed using horizontal sectioning of the base and continuous peripheral margins.  The histopathologic sections were reviewed in conjunction with the reference map.    Total blocks: 1  Total slides: 2    There were no cancer cells visualized on examination, therefore Mohs surgery was complete.    REPAIR: Secondary intent with Puracol collagen graft    The patient is status post Mohs micrographic surgery.  The surgical site was examined with attention to normal anatomic and functional relationships.  After consideration and discussion of multiple options with the patient, it was determined that healing by second intention would offer the best chance for preservation/restoration of all normal anatomic and functional relationships.  The patient verbalized understanding and agrees with this plan. It is also understood that should second intention healing be sub-optimal, additional procedures such as scar revision, steroid injection or dermabrasion may be recommended.    The open wound was cleaned with chlorhexidine and rinsed with sterile saline, a Puracol collagen graft was utilized to promote healing and protect the wound bed. The graft was sutured into place with surgical acrylate glue. A pressure dressing consisting of non-adherent gauze, gauze and Hypafix was applied.   Wound care was discussed with patient both orally and in writing.  The patient stated understanding and agreement of the course of care.    Puracol Lot Number: [id number]   Expiration: 10/31/25    Dr. Weathers  Durga (Mohs micrographic surgery fellow) performed the Mohs micrographic surgery and reconstruction under the direct supervision of Kodi Cat MD, who was present for the entire micrographic surgery and key portions of the reconstruction, and always immediately available.    Jasiel Calixto MD  Dermatology, PGY-5  Mohs surgery fellow    Scribe Disclosure:  I, PHAM BUTCHER, am serving as a scribe to document services personally performed by Kodi Cat MD based on data collection and the provider's statements to me.       Attending attestation:  I was present for key elements of the procedure and immediately available for all other portions of the procedure.  I have reviewed the note and edited it as necessary.    Kodi Cat M.D.  Professor  Director of Dermatologic Surgery  Department of Dermatology  [facility]    Dermatology Surgery Clinic  [facility]  [address] [place] [id number]

## 2023-05-01 NOTE — PATIENT INSTRUCTIONS

## 2023-05-01 NOTE — PROGRESS NOTES
[facility] Mohs Surgery Procedure Note    Case #: 1  Date of Service:  May 1, 2023  Surgery: Mohs micrographic surgery (MMS)  Staff surgeon: Kodi Cat MD  Fellow surgeon: Jasiel Calixto MD  Resident surgeon: Priscila Cartwright MD  Nurse: Soraida Giles CMA    Tumor Type: SCC  Location: left shin  Derm-Path Accession #: [id number]    Mohs Accession #:   Pre-Op Size: 1.3 cm x 0.8 cm  Final Defect Size: 1.7 cm x 1.8 cm  Number of Mohs stages: 1  Level of Defect: Fat  Local anesthetic: 3 mL 1% lidocaine with epinephrine  Repair Type: secondary intent with puracol    Procedure:    Stage I  We discussed the principles of treatment and most likely complications including scarring, bleeding, infection, swelling, pain, crusting, nerve damage, large wound,  incomplete excision, wound dehiscence,  nerve damage, recurrence, and a second procedure may be recommended to obtain the best cosmetic or functional result.    Informed consent was obtained and the patient underwent the procedure as follows:  The patient was placed supine on the operating table.  The cancer was identified, outlined with a marker, and verified by the patient.  The entire surgical field was prepped with chlorhexidine.  The surgical site was anesthetized using lidocaine with epinephrine.    The area of clinically apparent tumor was debulked. The layer of tissue was then surgically excised using a #15 blade and was then transferred onto a specimen sheet maintaining the orientation of the specimen. Hemostasis was obtained using electrocoagulation. The wound site was then covered with a dressing while the tissue samples were processed for examination.    The excised tissue was transported to the Mohs histology laboratory maintaining the tissue orientation.  The tissue specimen was relaxed so that the entire surgical margin was in a a single horizontal plane for sectioning and inked for precise mapping.  A precise reference map was drawn  to reflect the sectioning of the specimen, colored inking of the margins, and orientation on the patient.  The tissue was processed using horizontal sectioning of the base and continuous peripheral margins.  The histopathologic sections were reviewed in conjunction with the reference map.    Total blocks: 1  Total slides: 2    There were no cancer cells visualized on examination, therefore Mohs surgery was complete.    REPAIR: Secondary intent with Puracol collagen graft    The patient is status post Mohs micrographic surgery.  The surgical site was examined with attention to normal anatomic and functional relationships.  After consideration and discussion of multiple options with the patient, it was determined that healing by second intention would offer the best chance for preservation/restoration of all normal anatomic and functional relationships.  The patient verbalized understanding and agrees with this plan. It is also understood that should second intention healing be sub-optimal, additional procedures such as scar revision, steroid injection or dermabrasion may be recommended.    The open wound was cleaned with chlorhexidine and rinsed with sterile saline, a Puracol collagen graft was utilized to promote healing and protect the wound bed. The graft was sutured into place with surgical acrylate glue. A pressure dressing consisting of non-adherent gauze, gauze and Hypafix was applied.   Wound care was discussed with patient both orally and in writing.  The patient stated understanding and agreement of the course of care.    Puracol Lot Number: [id number]   Expiration: 10/31/25    Dr. Jasiel Calixto (Mohs micrographic surgery fellow) performed the Mohs micrographic surgery and reconstruction under the direct supervision of Kodi Cat MD, who was present for the entire micrographic surgery and key portions of the reconstruction, and always immediately available.    Jasiel Calixto MD  Dermatology, PGY-5  Mohs  surgery fellow    Scribe Disclosure:  I, PHAM GUADALUPE, am serving as a scribe to document services personally performed by Kodi Cat MD based on data collection and the provider's statements to me.       Attending attestation:  I was present for key elements of the procedure and immediately available for all other portions of the procedure.  I have reviewed the note and edited it as necessary.    Kodi Cat M.D.  Professor  Director of Dermatologic Surgery  Department of Dermatology  [facility]    Dermatology Surgery Clinic  [facility] [place] [id number]

## 2023-05-01 NOTE — NURSING NOTE
Chief Complaint   Patient presents with     Derm Problem     SCC L delores SAMS, RN-BSN  Dermatology Surgery  [phone]

## 2023-05-02 ENCOUNTER — TELEPHONE (OUTPATIENT)
Dept: DERMATOLOGY | Facility: CLINIC | Age: 73
End: 2023-05-02
Payer: COMMERCIAL

## 2023-05-02 NOTE — CONFIDENTIAL NOTE
Follow up call completed following Mohs procedure with Dr. Cat       Are you having pain? no  Are you taking pain medication? no  Are you applying ice?  no  Have you had any noticeable bleeding through the bandage? no   Do you have any other concerns? no         Please call [phone] option 3 if you have any questions or concerns.

## 2023-05-08 ENCOUNTER — OFFICE VISIT (OUTPATIENT)
Dept: DERMATOLOGY | Facility: CLINIC | Age: 73
End: 2023-05-08
Payer: COMMERCIAL

## 2023-05-08 DIAGNOSIS — L90.5 SCAR: Primary | ICD-10-CM

## 2023-05-08 DIAGNOSIS — Z85.828 HISTORY OF NONMELANOMA SKIN CANCER: ICD-10-CM

## 2023-05-08 PROCEDURE — 99024 POSTOP FOLLOW-UP VISIT: CPT | Performed by: DERMATOLOGY

## 2023-05-08 ASSESSMENT — PAIN SCALES - GENERAL: PAINLEVEL: NO PAIN (0)

## 2023-05-08 NOTE — LETTER
5/8/2023       RE: Lyndsey Hagen  [address]  [place] [address]     Dear Colleague,    Thank you for referring your patient, Lyndsey Hagen, to the [facility] DERMATOLOGIC SURGERY CLINIC [place] at [facility]. Please see a copy of my visit note below.    Dermatologic Surgery Clinic Note    May 8, 2023    Dermatology Problem List:  *Increased risk of cutaneous malignancy 2/2 PUVA*  Last FBSE 2/21/23.   1. CTCL, stage IIB  - Current tx: Bexarotene 1% gel (added on 5/23/16) with good response. Triamcinolone 0.1% cream as needed for irritation from bexarotene gel.  - Prior tx: PUVA with good response. Eventually transitioned to UVA1 with good response. Discontinued 3/12/2018.  2. 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 Lesion to monitor, left superior parietal scalp Photo 8/27/2018  4. Androgenic alopecia- LLLT 3x/week  5. ISK. LiqN2.   6. Solar lentiginosis      - hydroquinone 4% cream, Hydrating B5 cream, glycolic acid wash  7. botox years ago    Subjective: The patient is a 72 year old woman who presents today for a wound check after recent dermatologic surgery. No concerns for infection today. The patient continues with daily wound cares as recommended.    No other associated symptoms, modifying factors, or prior treatments, except when noted above. The patient denies any constitutional symptoms, lymphadenopathy, unintentional weight loss or decreased appetite. No other skin concerns today.    Objective:   Gen: This is a well appearing individual in no acute distress. The patient is alert and oriented x 3.  An exam of the left shin was performed today and visualized the following:    - The surgical site noted above is clean, dry, and intact. There is no surrounding erythema, purulence, or significant tenderness to palpation. No clinical evidence of infection noted today.    Assessment and Plan:     # SCC, left  shin, s/p [facility] 5/1/23  - The patient's surgery site(s) is/are healing very well. No evidence of infection on examination today.  - The patient was told to continue with wound cares until the area(s) is/are no longer crusted.   - The patient should follow up with dermatologic surgery PRN, as well as continue with regular skin exams in general dermatology clinic.    The patient was discussed with and evaluated by attending physician, Kodi Cat MD.    Scribe Disclosure:  I, PHAM BUTCHER, am serving as a scribe to document services personally performed by Kodi Cat MD based on data collection and the provider's statements to me.     Attending Attestation  I attest that the Scribe recorded the interview and exam that I personally performed.  I have reviewed the note and edited it as necessary.    Kodi Cat M.D.  Professor  Director of Dermatologic Surgery  Department of Dermatology  [facility]

## 2023-05-08 NOTE — NURSING NOTE
Chief Complaint   Patient presents with     Derm Problem     Patient is here today regarding 1 week granulation follow up for lower leg.     Rhonda MARTINEZ RN

## 2023-05-08 NOTE — PROGRESS NOTES
Dermatologic Surgery Clinic Note    May 8, 2023    Dermatology Problem List:  *Increased risk of cutaneous malignancy 2/2 PUVA*  Last FBSE 2/21/23.   1. CTCL, stage IIB  - Current tx: Bexarotene 1% gel (added on 5/23/16) with good response. Triamcinolone 0.1% cream as needed for irritation from bexarotene gel.  - Prior tx: PUVA with good response. Eventually transitioned to UVA1 with good response. Discontinued 3/12/2018.  2. 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 Lesion to monitor, left superior parietal scalp Photo 8/27/2018  4. Androgenic alopecia- LLLT 3x/week  5. ISK. LiqN2.   6. Solar lentiginosis      - hydroquinone 4% cream, Hydrating B5 cream, glycolic acid wash  7. botox years ago    Subjective: The patient is a 72 year old woman who presents today for a wound check after recent dermatologic surgery. No concerns for infection today. The patient continues with daily wound cares as recommended.    No other associated symptoms, modifying factors, or prior treatments, except when noted above. The patient denies any constitutional symptoms, lymphadenopathy, unintentional weight loss or decreased appetite. No other skin concerns today.    Objective:   Gen: This is a well appearing individual in no acute distress. The patient is alert and oriented x 3.  An exam of the left shin was performed today and visualized the following:    - The surgical site noted above is clean, dry, and intact. There is no surrounding erythema, purulence, or significant tenderness to palpation. No clinical evidence of infection noted today.    Assessment and Plan:     # SCC, left shin, s/p MMS 5/1/23  - The patient's surgery site(s) is/are healing very well. No evidence of infection on examination today.  - The patient was told to continue with wound cares until the area(s) is/are no longer crusted.   - The patient should follow up with dermatologic surgery PRN, as well as continue with regular  skin exams in general dermatology clinic.    The patient was discussed with and evaluated by attending physician, Kodi Cat MD.    Scribe Disclosure:  I, PHAM BUTCHER, am serving as a scribe to document services personally performed by Kodi Cat MD based on data collection and the provider's statements to me.     Attending Attestation  I attest that the Scribe recorded the interview and exam that I personally performed.  I have reviewed the note and edited it as necessary.    Kodi Cat M.D.  Professor  Director of Dermatologic Surgery  Department of Dermatology  [facility]

## 2023-05-09 NOTE — PROGRESS NOTES
[facility] Dermatology Note  Encounter Date: May 10, 2023  Office Visit      Dermatology Problem List:  1. CTCL, stage IIB  - Previously treated with PUVA with good response. Treated at [place] prior to establishing care here. Stopped due to difficulty percuring oxsoralen and risks associated with PUVA.  Restarted on UVA1 with clinical remission.  Discontinued UVA1 3/12/2018  -Temporarily discontinued UVA1 (4/26/17) due to insurance  -Targretin 1% gel (added on 5/23/16) with good response.   -Triamcinolone 0.1% cream as needed for irritation from targretin gel.  2. Lesion to monitor, left superior parietal scalp Photo 8/27/2018  3. SCC, R estrella - s/p MMS 4/29/19 and left shin s/p Mohs for SCC 2023  4. Androgenic alopecia- LLLT 3x/week  5. ISK. LiqN2.   6. Solar lentiginosis      - hydroquinone 4% cream, Hydrating B5 cream, glycolic acid wash  7. botox years ago.  ____________________________________________      ____________________________________________    Assessment & Plan:    # Rhytides- LV started hydroquinone, hydradting B% ceam/DEJ on top and glycolic acid wash  -add daily nectifirm   -after 1 month stop hydroquinone and switch to discoloration defense.   -hold hydroquinone 3 days prior to peels  - For skin peels on theface: Discussed risk of peels including but not limited to erythema, peeling, redness, blisters, reactivation of HSV, rare risk of scarring, and hypo and hyperpigmentation. Peel handout provided. Patient will likely require 3-6 peels, approximately 4-6 weeks apart for improvement. Patient will be using advanced corrective peel. Patient will discontinue differin/retinoinds/BP/efudex containing products 1 week prior and 1 week after. Regalado skin type II. Denies allergies to strawberries/aspirin/sulfa.  Has not been in Accutane in the last 6 months.  Not currently pregnant or breastfeeding. Patient has not had other  skin procedures in the last week and was counseled to avoid  hair removal procedures including lasers, depilatory cream, waxing and electrolysis the week prior.  Consent obtained in clinic today and pre-peel instructions provided in written format.     -future plan (lower face filler resytlane,  botox crows feet, CO2 laser if she feels she is comfortable with peels)    # Hx of NMSC and CTCL- follow up with Dr. Gaspar    Procedures Performed:   NADIYA  Follow-up: 3 months hans, 3 times with RN for peels prior. Advanced corrective peel.     Schedule filler visit with hans after 3 peeels. silk    Staff   Mary Kerr MD    Department of Dermatology  [facility]: Phone: [phone], Fax:[phone]  [facility]: Phone: [phone], Fax: [phone]            ____________________________________________    CC: Derm Problem (Patient reports use of hydroquinone cream and skinceuticals products. Patient declined filler at today's appointment. Patient reports receiving the covid booster 2 weeks ago. )    HPI:  Ms. Lyndsey Hagen is a(n) 72 year old female who presents today as a return patient for face, better.     Patient is otherwise feeling well, without additional skin concerns.    Labs Reviewed:  NADIYA  Physical Exam:  Vitals: There were no vitals taken for this visit.  SKIN:   Rhytdies, fine  There are scattered light brown macules on sun exposed areas.     - No other lesions of concern on areas examined.     Medications:  Current Outpatient Medications   Medication     atorvastatin (LIPITOR) 10 MG tablet     betamethasone dipropionate (DIPROSONE) 0.05 % external lotion     Bexarotene (TARGRETIN) 1 % GEL     Cholecalciferol (VITAMIN D-3 PO)     Coenzyme Q10 (CO Q-10 PO)     cyclobenzaprine (FLEXERIL) 5 MG tablet     hydroquinone (DEEPALI) 4 % external cream     Multiple Vitamin (MULTIVITAMINS PO)     Probiotic Product (SOLUBLE FIBER/PROBIOTICS PO)      triamcinolone (KENALOG) 0.1 % external cream     Current Facility-Administered Medications   Medication     lidocaine 1% with EPINEPHrine 1:100,000 injection 3 mL      Past Medical History:   Patient Active Problem List   Diagnosis     Cutaneous T-cell lymphoma (H)     Past Medical History:   Diagnosis Date     Cutaneous T-cell lymphoma (H)         CC Juan Daniel Cat MD  [address] [place] [address] on close of this encounter.

## 2023-05-10 ENCOUNTER — OFFICE VISIT (OUTPATIENT)
Dept: DERMATOLOGY | Facility: CLINIC | Age: 73
End: 2023-05-10
Payer: COMMERCIAL

## 2023-05-10 DIAGNOSIS — L57.8 PHOTOAGING OF SKIN: Primary | ICD-10-CM

## 2023-05-10 PROCEDURE — 99213 OFFICE O/P EST LOW 20 MIN: CPT | Performed by: DERMATOLOGY

## 2023-05-10 ASSESSMENT — PAIN SCALES - GENERAL: PAINLEVEL: NO PAIN (0)

## 2023-05-10 NOTE — LETTER
5/10/2023       RE: Lyndsey Hagen  [address]  [place] [address]     Dear Colleague,    Thank you for referring your patient, Lyndsey Hagen, to the [facility] DERMATOLOGY CLINIC [place] [facility]. Please see a copy of my visit note below.    [facility] Dermatology Note  Encounter Date: May 10, 2023  Office Visit      Dermatology Problem List:  1. CTCL, stage IIB  - Previously treated with PUVA with good response. Treated at [place] prior to establishing care here. Stopped due to difficulty percuring oxsoralen and risks associated with PUVA.  Restarted on UVA1 with clinical remission.  Discontinued UVA1 3/12/2018  -Temporarily discontinued UVA1 (4/26/17) due to insurance  -Targretin 1% gel (added on 5/23/16) with good response.   -Triamcinolone 0.1% cream as needed for irritation from targretin gel.  2. Lesion to monitor, left superior parietal scalp Photo 8/27/2018  3. SCC, R estrella - s/p MMS 4/29/19 and left shin s/p Mohs for SCC 2023  4. Androgenic alopecia- LLLT 3x/week  5. ISK. LiqN2.   6. Solar lentiginosis      - hydroquinone 4% cream, Hydrating B5 cream, glycolic acid wash  7. botox years ago.  ____________________________________________      ____________________________________________    Assessment & Plan:    # Rhytides- LV started hydroquinone, hydradting B% ceam/DEJ on top and glycolic acid wash  -add daily nectifirm   -after 1 month stop hydroquinone and switch to discoloration defense.   -hold hydroquinone 3 days prior to peels  - For skin peels on theface: Discussed risk of peels including but not limited to erythema, peeling, redness, blisters, reactivation of HSV, rare risk of scarring, and hypo and hyperpigmentation. Peel handout provided. Patient will likely require 3-6 peels, approximately 4-6 weeks apart for improvement. Patient will be using advanced corrective peel. Patient will discontinue  differin/retinoinds/BP/efudex containing products 1 week prior and 1 week after. Regalado skin type II. Denies allergies to strawberries/aspirin/sulfa.  Has not been in Accutane in the last 6 months.  Not currently pregnant or breastfeeding. Patient has not had other  skin procedures in the last week and was counseled to avoid hair removal procedures including lasers, depilatory cream, waxing and electrolysis the week prior.  Consent obtained in clinic today and pre-peel instructions provided in written format.     -future plan (lower face filler resytlane,  botox crows feet, CO2 laser if she feels she is comfortable with peels)    # Hx of NMSC and CTCL- follow up with Dr. Gaspar    Procedures Performed:   NADIYA  Follow-up: 3 months hans, 3 times with RN for peels prior. Advanced corrective peel.     Schedule filler visit with hans after 3 peeels. silk    Staff   Mary Kerr MD    Department of Dermatology  [facility]: Phone: [phone], Fax:[phone]  [facility]: Phone: [phone], Fax: [phone]            ____________________________________________    CC: Derm Problem (Patient reports use of hydroquinone cream and skinceuticals products. Patient declined filler at today's appointment. Patient reports receiving the covid booster 2 weeks ago. )    HPI:  Ms. Lyndsey Hagen is a(n) 72 year old female who presents today as a return patient for face, better.     Patient is otherwise feeling well, without additional skin concerns.    Labs Reviewed:  NA  Physical Exam:  Vitals: There were no vitals taken for this visit.  SKIN:   Rhytdies, fine  There are scattered light brown macules on sun exposed areas.     - No other lesions of concern on areas examined.     Medications:  Current Outpatient Medications   Medication    atorvastatin (LIPITOR) 10 MG tablet    betamethasone  dipropionate (DIPROSONE) 0.05 % external lotion    Bexarotene (TARGRETIN) 1 % GEL    Cholecalciferol (VITAMIN D-3 PO)    Coenzyme Q10 (CO Q-10 PO)    cyclobenzaprine (FLEXERIL) 5 MG tablet    hydroquinone (DEEPALI) 4 % external cream    Multiple Vitamin (MULTIVITAMINS PO)    Probiotic Product (SOLUBLE FIBER/PROBIOTICS PO)    triamcinolone (KENALOG) 0.1 % external cream     Current Facility-Administered Medications   Medication    lidocaine 1% with EPINEPHrine 1:100,000 injection 3 mL      Past Medical History:   Patient Active Problem List   Diagnosis    Cutaneous T-cell lymphoma (H)     Past Medical History:   Diagnosis Date    Cutaneous T-cell lymphoma (H)         CC Juan Daniel Cat MD  [address] [place] [address] on close of this encounter.

## 2023-05-10 NOTE — NURSING NOTE
Dermatology Rooming Note    Lyndsey Hagen's goals for this visit include:   Chief Complaint   Patient presents with     Derm Problem     Patient reports use of hydroquinone cream and skinceuticals products. Patient declined filler at today's appointment. Patient reports receiving the covid booster 2 weeks ago.      Margarita Chavarria LPN

## 2023-05-10 NOTE — PATIENT INSTRUCTIONS
[facility] Dermatology Visit    Thank you for allowing us to participate in your care. Your findings, instructions and follow-up plan are as follows:         -add daily nectifirm   -after 1 month stop hydroquinone and switch to discoloration defense.   -hold hydroquinone 3 days prior to peels    Chemical Peel Preparation    Pre-procedure:  Reveal all medical or skin conditions, especially cold sores, use of prescriptions medications, recent peels or other facial procedure, etc. to help determine procedure plan  Arrive with a fully cleansed face, free of makeup  It is recommended that men not shave within a few hours prior to the treatment  Plan for photos to track progress and in case of side effects    Stop ALL of the following 7 days pre-procedure:  Topical vitamin A products such as retinols or retinoids (Differin, adapalene, Retin-A, tretinoin)  Products with glycolic acid, salicylic acid, Vitamin A, hydroquinone  Depilatory use/waxing  Chemical peel within the 8 weeks depending on peel depth  Excessively drying or irritating products  Electrolysis  Hair chemicals    2 weeks pre-procedure:  Cannot have Botox of fillers    4-6 weeks pre-procedure:  Free of cystic acne or herpes outbreak  If using Efudex treatment for pre-cancer, treatment needs to be completed    Who should I call with questions?  [facility]: [phone]  [facility]: [phone]  For urgent needs outside of business hours call the [facility] at [phone] and ask for the dermatology [id number]     When should I call my doctor?  If you are worsening or not improving, please, contact us or seek urgent care as noted below.     Who should I call with questions (adults)?  [facility] (adult and pediatric): [phone]  [facility] (adult): [phone]  For urgent needs outside of  business hours call the [facility] at [phone] and ask for the dermatology resident on call  If this is a medical emergency and you are unable to reach an ER, Call [phone]    Who should I call with questions (pediatric)?  [facility]- Pediatric Dermatology  Dr. Stefani Cota, Dr. Cheko Felton, Dr. Tamiko Merlos, Jia Dempsey, WEI Leone, Dr. Anu Vincent & Dr. Leopoldo Richardson  Non Urgent  Nurse Triage Line; [phone]- Jduy and Tsoha RN Care Coordinators   [facility]ior (/Complex ) [phone]    If you need a prescription refill, please contact your pharmacy. Refills are approved or denied by our physicians during normal business hours, Monday through Fridays  Per office policy, refills will not be granted if you have not been seen within the past year (or sooner depending on your child's condition).    Scheduling Information:  Pediatric Appointment Scheduling and Call Center [phone]  Radiology Scheduling- [phone]  Sedation Unit Scheduling- [phone]  [place] Scheduling- General [phone]; Pediatric Dermatology [phone]  Main  Services: [phone]  [place]: [phone]  [place]: [phone]  Hmong/Denzel/Icelandic: [phone]  Preadmission Nursing Department Fax Number: [phone] (fax all pre-operative paperwork to this number)    For urgent matters arising during evenings, weekends, or holidays that cannot wait for normal business hours please call [phone] and ask for the dermatology resident on call to be paged.

## 2023-05-19 ENCOUNTER — TELEPHONE (OUTPATIENT)
Dept: DERMATOLOGY | Facility: CLINIC | Age: 73
End: 2023-05-19

## 2023-05-19 NOTE — TELEPHONE ENCOUNTER
[facility] Call Center    Phone Message    May a detailed message be left on voicemail: yes     Reason for Call: Other: Pt states she needs to schedule a chemical peel with Pooja.      Please call Pt back to schedule.     Thank you.     Action Taken: Message routed to:  [facility] Derm    Travel Screening: Not Applicable

## 2023-05-22 NOTE — TELEPHONE ENCOUNTER
[facility] Call Center    Phone Message    May a detailed message be left on voicemail: yes     Reason for Call: Other: Pt calling back to ask when someone will be calling her to schedule. Please call Pt back. Thank you.     Action Taken: Message routed to:  Adult Clinics: Dermatology p [id number]    Travel Screening: Not Applicable

## 2023-05-26 ENCOUNTER — MYC MEDICAL ADVICE (OUTPATIENT)
Dept: DERMATOLOGY | Facility: CLINIC | Age: 73
End: 2023-05-26

## 2023-06-08 ENCOUNTER — MEDICAL CORRESPONDENCE (OUTPATIENT)
Dept: HEALTH INFORMATION MANAGEMENT | Facility: CLINIC | Age: 73
End: 2023-06-08
Payer: COMMERCIAL

## 2023-07-12 DIAGNOSIS — E78.00 HIGH CHOLESTEROL: Primary | ICD-10-CM

## 2023-07-13 ENCOUNTER — ANCILLARY PROCEDURE (OUTPATIENT)
Dept: MAMMOGRAPHY | Facility: CLINIC | Age: 73
End: 2023-07-13
Attending: INTERNAL MEDICINE
Payer: COMMERCIAL

## 2023-07-13 DIAGNOSIS — Z12.31 VISIT FOR SCREENING MAMMOGRAM: ICD-10-CM

## 2023-07-13 PROCEDURE — 77063 BREAST TOMOSYNTHESIS BI: CPT | Mod: GC

## 2023-07-13 PROCEDURE — 77067 SCR MAMMO BI INCL CAD: CPT | Mod: GC

## 2023-07-17 NOTE — TELEPHONE ENCOUNTER
ATORVASTATIN 10 MG TABLET     Last Written Prescription Date:    Last Fill Quantity: ,   # refills:   Last Office Visit : 9/8/22  Future Office visit:  9/15/23    Routing refill request to provider for review/approval because:  Medication is reported/historical

## 2023-07-18 RX ORDER — ATORVASTATIN CALCIUM 10 MG/1
10 TABLET, FILM COATED ORAL DAILY
Qty: 90 TABLET | Refills: 0 | Status: SHIPPED | OUTPATIENT
Start: 2023-07-18 | End: 2023-09-15

## 2023-07-18 NOTE — TELEPHONE ENCOUNTER
Pt was last seen in clinic on 9/8/22. Pt last had atorvastatin (LIPITOR) 10 MG tablet refilled on 6/2/23 for a 90 day supply by PCP. Due for refill. Medication refill sent to pharmacy. Lab needed with upcoming appt note sent to patient via [facility].    ARVIN MCGUIRE RN on 7/18/2023 at 9:47 AM

## 2023-07-26 ENCOUNTER — OFFICE VISIT (OUTPATIENT)
Dept: DERMATOLOGY | Facility: CLINIC | Age: 73
End: 2023-07-26
Payer: COMMERCIAL

## 2023-07-26 DIAGNOSIS — L98.8 RHYTIDES: Primary | ICD-10-CM

## 2023-07-26 PROCEDURE — 96999 UNLISTED SPEC DERM SVC/PX: CPT | Performed by: DERMATOLOGY

## 2023-07-26 NOTE — PROGRESS NOTES
I saw th patient during and after the procedure.    No complications.     Mary Kerr MD    Department of Dermatology  [facility]: Phone: [phone], Fax:[phone]  [facility]: Phone: [phone], Fax: [phone]

## 2023-07-26 NOTE — PATIENT INSTRUCTIONS
Skin Peel Post Care Instructions    I will experience redness, swelling, peeling, pain, and heat sensation which can last 5-10days and may persist for 2-3weeks. I may experience itching, or acne. Risks are permanent scarring, temporary or permanent skin lightening or darkening, infection, and eye injury. I understand my outcome could be no improvement or slight improvement. Multiple treatments may be required.     What can I expect?  Skin peeling for three to five days  Flaking  Skin darkening  Redness    How do I take care of my skin?  Patient compliance is mandatory for an optimal outcome and to avoid complications  Wear sunscreen (SPF 30 or greater) and a hat. Stay out of the sun for three to four weeks   Use a gentle skin care regimen with a sunscreen with at least an SPF of 30 or more. Examples include the following:   SkinCeuticals Gentle Cleanser and SkinCeuticals Physical Matte UV DEFENSE SPF 50   Cetaphil Soap followed by Cetaphil Sunscreen in the am and pm  Put on a bland moisturizer (Aquaphor or Vaseline) if sunscreen stings  Do not pick at peel or peel the skin. This will cause scarring.  Wait to use medicated creams until the skin has healed. This occurs five to seven days later  You can use make-up after 24 hours. Make sure make-up does NOT contain salicylic acid.   Eye make-up and lipstick are okay immediately after procedure  Do not use facial scrubs, depilatories, steam, any exfoliation procedures, etc. on your face (or treated area of skin) for one week post-peel    When should I call the doctor?  Cold sores  Swelling  Crusting    Who should I call with questions?  [facility]: [phone]   [facility]: [phone]  For urgent needs outside of business hours call the [facility] at [phone] and ask for the dermatology resident on call

## 2023-07-26 NOTE — NURSING NOTE
Nursing Peel Treatment Record:  Jul 26, 2023    Pre peel:  Any changes in health or medications: No  Strawberry or aspirin allergy (If yes, then no salicylic acid peels to be given and procedure to be held):  No  Sulfa allergy (If yes, then SkinCeuticals Sensitive Skin peel procedure to be held):  No  Is the patient taking Valtrex:  No.  If so, date started:    Pregnant or breastfeeding (If yes, peel procedure to be held): No  Baseline photo obtained (3 views): No previous visit  Areas treated today: face  Treatment #: 1.  Was retinoid stopped 7 days prior to peel: N/A  Peel Type: Advanced Corretive Peel  Has patient had electrolysis, depilatory creams, waxing or laser hair removal in the last week to treatment site: No.  If yes, then hold peel.    Peel record:  Eye shields were placed.  Area to be treated was cleansed with Simply Clean Cleanser using 4X4 gauze pads.  Dermaplaning was performed: no.  Area was then toned with with the Conditioning Solution using 4X4 gauze pads.  Then, the areas were degreased with acetone. Time-out was performed to evaluate for any sensitive skin.    Hydrabalm was then applied to the lips, perinasal region and near the outer canthi.   The peel was applied with 2x2 for 2 passes. Peel is self neutralizing.Pt   CE Ferulic was applied and followed by Sunscreen (Bright girl tinted)    Additional treatment notes: Pt tolerated peel well. Mild even erythema noted.    Post peel:  Patient reminded of need to wait to use medicated creams until the skin has healed. This occurs five to seven days later. Post peel care instructions provided including need for sun avoidance. Patient tolerated peel well, no complications noted.     Payment:  The patient paid cosmetic fee for advanced corrective peel.

## 2023-07-26 NOTE — LETTER
7/26/2023       RE: Lyndsey Hagen  [address]  [place] [address]     Dear Colleague,    Thank you for referring your patient, Lyndsey Hagen, to the [facility] DERMATOLOGY [facility]INIC [facility]. Please see a copy of my visit note below.    I saw th patient during and after the procedure.    No complications.     Mary Kerr MD    Department of Dermatology  [facility]: Phone: [phone], Fax:[phone]  [facility]: Phone: [phone], Fax: [phone]

## 2023-08-07 ENCOUNTER — OFFICE VISIT (OUTPATIENT)
Dept: DERMATOLOGY | Facility: CLINIC | Age: 73
End: 2023-08-07
Payer: COMMERCIAL

## 2023-08-07 DIAGNOSIS — L91.0 HYPERTROPHIC SCAR: Primary | ICD-10-CM

## 2023-08-07 PROCEDURE — 99207 PR NO CHARGE LOS: CPT | Mod: GC | Performed by: DERMATOLOGY

## 2023-08-07 NOTE — LETTER
8/7/2023       RE: Lyndsey Hagen  [address]  [place] [address]     Dear Colleague,    Thank you for referring your patient, Lyndsey Hagen, to the [facility] DERMATOLOGIC SURGERY CLINIC [place] [facility]. Please see a copy of my visit note below.    Dermatologic Surgery Post-Op Scar Check     CC: scar eval     Dermatology Problem List:  1. CTCL, stage IIB  - Previously treated with PUVA with good response. Treated at [place] prior to establishing care here. Stopped due to difficulty percuring oxsoralen and risks associated with PUVA.  Restarted on UVA1 with clinical remission.  Discontinued UVA1 3/12/2018  -Temporarily discontinued UVA1 (4/26/17) due to insurance  -Targretin 1% gel (added on 5/23/16) with good response.   -Triamcinolone 0.1% cream as needed for irritation from targretin gel.  2. Lesion to monitor, left superior parietal scalp Photo 8/27/2018  3. 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 Androgenic alopecia- LLLT 3x/week  5. ISK. LiqN2.   6. Solar lentiginosis      - hydroquinone 4% cream, Hydrating B5 cream, glycolic acid wash  7. botox years ago.    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 Discussed she could also try her hydroquinone cream daily on the area for up to 3 months.    - The patient should follow up with dermatologic surgery PRN, as well as continue with regular skin exams in general dermatology clinic.  - Verbal consent was obtained. 0.2 of kenalog 40 mg/mL was used to inject into one sites in the left shin. Patient tolerated procedure well.     No charge for ILK injection today.     Patient was discussed with and evaluated by attending physician Dr. Cat.    Priscila Cartwright MD  PGY-5, Micrographic Surgery and Dermatologic Oncology (MSDO) Fellow              Attestation signed by Kodi Cat MD at 8/14/2023 10:26 AM:  Attending Attestation  I attest that I discussed the case with the Fellow.  I agree with the plan.   I have reviewed the note and edited it as necessary.    Kodi Cat M.D.  Professor  Director of Dermatologic Surgery  Department of Dermatology  [facility]

## 2023-08-07 NOTE — NURSING NOTE
Drug Administration Record    Prior to injection, verified patient identity using patient's name and date of birth.  Due to injection administration, patient instructed to remain in clinic for 15 minutes  afterwards, and to report any adverse reaction to me immediately.    Drug Name: triamcinolone acetonide(kenalog)  Dose: 0.2mL of triamcinolone 40mg/mL, 8mg dose  Route administered: ID  NDC #: Kenalog-40 ([address]-0)  Amount of waste(mL):0.8  Reason for waste: Single use vial    LOT #: [id number]  SITE: lower leg  : amneal  EXPIRATION DATE: 12/2024

## 2023-08-22 ENCOUNTER — OFFICE VISIT (OUTPATIENT)
Dept: DERMATOLOGY | Facility: CLINIC | Age: 73
End: 2023-08-22
Payer: COMMERCIAL

## 2023-08-22 DIAGNOSIS — D22.9 MULTIPLE BENIGN NEVI: ICD-10-CM

## 2023-08-22 DIAGNOSIS — L81.4 LENTIGINES: ICD-10-CM

## 2023-08-22 DIAGNOSIS — Z85.828 HISTORY OF NONMELANOMA SKIN CANCER: Primary | ICD-10-CM

## 2023-08-22 DIAGNOSIS — C84.00 MYCOSIS FUNGOIDES, UNSPECIFIED BODY REGION (H): ICD-10-CM

## 2023-08-22 DIAGNOSIS — L82.1 SEBORRHEIC KERATOSES: ICD-10-CM

## 2023-08-22 PROCEDURE — 99213 OFFICE O/P EST LOW 20 MIN: CPT | Performed by: DERMATOLOGY

## 2023-08-22 ASSESSMENT — PAIN SCALES - GENERAL: PAINLEVEL: NO PAIN (0)

## 2023-08-22 NOTE — LETTER
8/22/2023       RE: Lyndsey Hagen  [address]  [place] [address]     Dear Colleague,    Thank you for referring your patient, Lyndsey Hagen, to the [facility] DERMATOLOGY CLINIC [place] at [facility]. Please see a copy of my visit note below.    [facility] Dermatology Note  Encounter Date: Aug 22, 2023  Office Visit     Dermatology Problem List:  1. CTCL, stage IIB  - Previously treated with PUVA with good response. Treated at [place] prior to establishing care here. Stopped due to difficulty percuring oxsoralen and risks associated with PUVA.  Restarted on UVA1 with clinical remission.  Discontinued UVA1 3/12/2018  -Temporarily discontinued UVA1 (4/26/17) due to insurance  -Targretin 1% gel (added on 5/23/16) with good response.   -Triamcinolone 0.1% cream as needed for irritation from targretin gel.  2. Lesion to monitor, left superior parietal scalp Photo 8/27/2018  3. 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 Androgenic alopecia- LLLT 3x/week  5. ISK. LiqN2.   6. Solar lentiginosis      - hydroquinone 4% cream, Hydrating B5 cream, glycolic acid wash  7. botox years ago    ____________________________________________    Assessment & Plan:     # CTCL- currently with no active clinical disease  - Continue to monitor  - Triamcinolone and Targretin gel as needed    # History of skin cancer- NERD    # Benign findings: nevi, seborrheic keratoses, lentigo      Follow-up: 6 month(s) in-person, or earlier for new or changing lesions    Staff:     Arlen Guillory MD  ____________________________________________    CC: Skin Check (No areas of concern)    HPI:  Ms. Lyndsey Hagen is a(n) 72 year old female who presents today as a return patient for a skin check ( history of PUVA) and CTCL.  Doing very well.  Has not needed topicals.  No weight loss, fatigue or night sweats.  No tender or bleeding lesions.       Patient is otherwise feeling well, without additional skin concerns.     Labs Reviewed:  N/A    Physical Exam:  Vitals: There were no vitals taken for this visit.  SKIN: Full skin, which includes the head/face, both arms, chest, back, abdomen,both legs, genitalia and/or groin buttocks, digits and/or nails, was examined.  - nevi with no atypia on dermoscopy  - scars with NERD  - lentigo  - waxy stuck on papules  - no cervical, submandibular, axillary or inguinal adenopathy   - No other lesions of concern on areas examined.     Medications:  Current Outpatient Medications   Medication    atorvastatin (LIPITOR) 10 MG tablet    Bexarotene (TARGRETIN) 1 % GEL    Cholecalciferol (VITAMIN D-3 PO)    Coenzyme Q10 (CO Q-10 PO)    Multiple Vitamin (MULTIVITAMINS PO)    Probiotic Product (SOLUBLE FIBER/PROBIOTICS PO)    triamcinolone (KENALOG) 0.1 % external cream    betamethasone dipropionate (DIPROSONE) 0.05 % external lotion    hydroquinone (DEEPALI) 4 % external cream     Current Facility-Administered Medications   Medication    lidocaine 1% with EPINEPHrine 1:100,000 injection 3 mL      Past Medical History:   Patient Active Problem List   Diagnosis    Cutaneous T-cell lymphoma (H)     Past Medical History:   Diagnosis Date    Cutaneous T-cell lymphoma (H)        CC Referred Self, MD  No address on file on close of this encounter.

## 2023-08-22 NOTE — PROGRESS NOTES
[facility] Dermatology Note  Encounter Date: Aug 22, 2023  Office Visit     Dermatology Problem List:  1. CTCL, stage IIB  - Previously treated with PUVA with good response. Treated at [facility] prior to establishing care here. Stopped due to difficulty percuring oxsoralen and risks associated with PUVA.  Restarted on UVA1 with clinical remission.  Discontinued UVA1 3/12/2018  -Temporarily discontinued UVA1 (4/26/17) due to insurance  -Targretin 1% gel (added on 5/23/16) with good response.   -Triamcinolone 0.1% cream as needed for irritation from targretin gel.  2. Lesion to monitor, left superior parietal scalp Photo 8/27/2018  3. 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 Androgenic alopecia- LLLT 3x/week  5. ISK. LiqN2.   6. Solar lentiginosis      - hydroquinone 4% cream, Hydrating B5 cream, glycolic acid wash  7. botox years ago    ____________________________________________    Assessment & Plan:     # CTCL- currently with no active clinical disease  - Continue to monitor  - Triamcinolone and Targretin gel as needed    # History of skin cancer- NERD    # Benign findings: nevi, seborrheic keratoses, lentigo      Follow-up: 6 month(s) in-person, or earlier for new or changing lesions    Staff:     Arlen Guillory MD  ____________________________________________    CC: Skin Check (No areas of concern)    HPI:  Ms. Lyndsey Hagen is a(n) 72 year old female who presents today as a return patient for a skin check ( history of PUVA) and CTCL.  Doing very well.  Has not needed topicals.  No weight loss, fatigue or night sweats.  No tender or bleeding lesions.      Patient is otherwise feeling well, without additional skin concerns.     Labs Reviewed:  N/A    Physical Exam:  Vitals: There were no vitals taken for this visit.  SKIN: Full skin, which includes the head/face, both arms, chest, back, abdomen,both legs, genitalia and/or groin buttocks, digits and/or nails,  was examined.  - nevi with no atypia on dermoscopy  - scars with NERD  - lentigo  - waxy stuck on papules  - no cervical, submandibular, axillary or inguinal adenopathy   - No other lesions of concern on areas examined.     Medications:  Current Outpatient Medications   Medication    atorvastatin (LIPITOR) 10 MG tablet    Bexarotene (TARGRETIN) 1 % GEL    Cholecalciferol (VITAMIN D-3 PO)    Coenzyme Q10 (CO Q-10 PO)    Multiple Vitamin (MULTIVITAMINS PO)    Probiotic Product (SOLUBLE FIBER/PROBIOTICS PO)    triamcinolone (KENALOG) 0.1 % external cream    betamethasone dipropionate (DIPROSONE) 0.05 % external lotion    hydroquinone (DEEPALI) 4 % external cream     Current Facility-Administered Medications   Medication    lidocaine 1% with EPINEPHrine 1:100,000 injection 3 mL      Past Medical History:   Patient Active Problem List   Diagnosis    Cutaneous T-cell lymphoma (H)     Past Medical History:   Diagnosis Date    Cutaneous T-cell lymphoma (H)        CC Referred Self, MD  No address on file on close of this encounter.

## 2023-08-22 NOTE — NURSING NOTE
Chief Complaint   Patient presents with    Skin Check     No areas of concern     Bob Gonzales, EMT

## 2023-08-23 ENCOUNTER — OFFICE VISIT (OUTPATIENT)
Dept: DERMATOLOGY | Facility: CLINIC | Age: 73
End: 2023-08-23
Payer: COMMERCIAL

## 2023-08-23 DIAGNOSIS — L98.8 RHYTIDES: Primary | ICD-10-CM

## 2023-08-23 PROCEDURE — 96999 UNLISTED SPEC DERM SVC/PX: CPT | Performed by: DERMATOLOGY

## 2023-08-23 NOTE — PROGRESS NOTES
Nursing Peel Treatment Record:  Aug 23, 2023    Pre peel:  Any changes in health or medications: No  Strawberry or aspirin allergy (If yes, then no salicylic acid peels to be given and procedure to be held):  No  Sulfa allergy (If yes, then [employer] Sensitive Skin peel procedure to be held):  No  Is the patient taking Valtrex:  No.  If so, date started:    Pregnant or breastfeeding (If yes, peel procedure to be held): No  Baseline photo obtained (3 views): Yes  Areas treated today: face  Treatment #: 2.  Was retinoid stopped 7 days prior to peel: Yes  Peel Type: Vitalize peel  Has patient had electrolysis, depilatory creams, waxing or laser hair removal in the last week to treatment site: No.  If yes, then hold peel.    Peel record:  Eye shields were placed.  Dermaplaning was performed: no.  Then, the areas were degreased with acetone. Time-out was performed to evaluate for any sensitive skin.    Hydrabalm was then applied to the lips, perinasal region and near the outer canthi.   The peel was applied with 2x2 guaze pads for 1 passes.  CE Ferulic and Bright tinted mineral applied.    Additional treatment notes: Pt tolerated peel     Post peel:  Patient reminded of need to wait to use medicated creams until the skin has healed. This occurs five to seven days later. Post peel care instructions provided including need for sun avoidance. Patient tolerated peel well, no complications noted.     Payment:  The patient paid the cosmetic fee for this peel.

## 2023-08-23 NOTE — LETTER
8/23/2023       RE: Lyndsey Hagen  [address]  [place] [address]     Dear Colleague,    Thank you for referring your patient, Lyndsey Hagen, to the [facility]. Please see a copy of my visit note below.    I was present in the building at the time of this peel    Nursing Peel Treatment Record:  Aug 23, 2023    Pre peel:  Any changes in health or medications: No  Strawberry or aspirin allergy (If yes, then no salicylic acid peels to be given and procedure to be held):  No  Sulfa allergy (If yes, then [employer]ut[employer] Sensitive Skin peel procedure to be held):  No  Is the patient taking Valtrex:  No.  If so, date started:    Pregnant or breastfeeding (If yes, peel procedure to be held): No  Baseline photo obtained (3 views): Yes  Areas treated today: face  Treatment #: 2.  Was retinoid stopped 7 days prior to peel: Yes  Peel Type: Vitalize peel  Has patient had electrolysis, depilatory creams, waxing or laser hair removal in the last week to treatment site: No.  If yes, then hold peel.    Peel record:  Eye shields were placed.  Dermaplaning was performed: no.  Then, the areas were degreased with acetone. Time-out was performed to evaluate for any sensitive skin.    Hydrabalm was then applied to the lips, perinasal region and near the outer canthi.   The peel was applied with 2x2 guaze pads for 1 passes.  CE Ferulic and Bright tinted mineral applied.    Additional treatment notes: Pt tolerated peel     Post peel:  Patient reminded of need to wait to use medicated creams until the skin has healed. This occurs five to seven days later. Post peel care instructions provided including need for sun avoidance. Patient tolerated peel well, no complications noted.     Payment:  The patient paid the cosmetic fee for this peel.

## 2023-08-23 NOTE — PATIENT INSTRUCTIONS
Skin Peel Post Care Instructions    I will experience redness, swelling, peeling, pain, and heat sensation which can last 5-10days and may persist for 2-3weeks. I may experience itching, or acne. Risks are permanent scarring, temporary or permanent skin lightening or darkening, infection, and eye injury. I understand my outcome could be no improvement or slight improvement. Multiple treatments may be required.     What can I expect?  Skin peeling for three to five days  Flaking  Skin darkening  Redness    How do I take care of my skin?  Patient compliance is mandatory for an optimal outcome and to avoid complications  Wear sunscreen (SPF 30 or greater) and a hat. Stay out of the sun for three to four weeks   Use a gentle skin care regimen with a sunscreen with at least an SPF of 30 or more. Examples include the following:   SkinCeuticals Gentle Cleanser and SkinCeuticals Physical Matte UV DEFENSE SPF 50   Cetaphil Soap followed by Cetaphil Sunscreen in the am and pm  Put on a bland moisturizer (Aquaphor or Vaseline) if sunscreen stings  Do not pick at peel or peel the skin. This will cause scarring.  Wait to use medicated creams until the skin has healed. This occurs five to seven days later  You can use make-up after 24 hours. Make sure make-up does NOT contain salicylic acid.   Eye make-up and lipstick are okay immediately after procedure  Do not use facial scrubs, depilatories, steam, any exfoliation procedures, etc. on your face (or treated area of skin) for one week post-peel    When should I call the doctor?  Cold sores  Swelling  Crusting    Who should I call with questions?  [facility]: [phone]   [facility]: [phone]  For urgent needs outside of business hours call the [facility] at [phone] and ask for the dermatology resident on call

## 2023-09-06 ASSESSMENT — ENCOUNTER SYMPTOMS
BACK PAIN: 1
ARTHRALGIAS: 1
MUSCLE WEAKNESS: 0
NECK PAIN: 0
MUSCLE CRAMPS: 0
JOINT SWELLING: 0
STIFFNESS: 0
MYALGIAS: 0

## 2023-09-15 ENCOUNTER — LAB (OUTPATIENT)
Dept: LAB | Facility: CLINIC | Age: 73
End: 2023-09-15
Payer: COMMERCIAL

## 2023-09-15 ENCOUNTER — OFFICE VISIT (OUTPATIENT)
Dept: INTERNAL MEDICINE | Facility: CLINIC | Age: 73
End: 2023-09-15
Payer: COMMERCIAL

## 2023-09-15 VITALS
WEIGHT: 165.8 LBS | TEMPERATURE: 97.3 F | SYSTOLIC BLOOD PRESSURE: 111 MMHG | DIASTOLIC BLOOD PRESSURE: 74 MMHG | OXYGEN SATURATION: 96 % | HEART RATE: 75 BPM | BODY MASS INDEX: 25.97 KG/M2

## 2023-09-15 DIAGNOSIS — E78.00 HIGH CHOLESTEROL: ICD-10-CM

## 2023-09-15 DIAGNOSIS — Z87.891 PERSONAL HISTORY OF TOBACCO USE, PRESENTING HAZARDS TO HEALTH: ICD-10-CM

## 2023-09-15 DIAGNOSIS — Z00.00 ENCOUNTER FOR MEDICARE ANNUAL WELLNESS EXAM: Primary | ICD-10-CM

## 2023-09-15 DIAGNOSIS — C84.A0 CUTANEOUS T-CELL LYMPHOMA, UNSPECIFIED BODY REGION (H): ICD-10-CM

## 2023-09-15 DIAGNOSIS — M85.80 OSTEOPENIA, UNSPECIFIED LOCATION: ICD-10-CM

## 2023-09-15 DIAGNOSIS — Z00.00 ENCOUNTER FOR MEDICARE ANNUAL WELLNESS EXAM: ICD-10-CM

## 2023-09-15 LAB
ALBUMIN SERPL BCG-MCNC: 4.5 G/DL (ref 3.5–5.2)
ALP SERPL-CCNC: 56 U/L (ref 35–104)
ALT SERPL W P-5'-P-CCNC: 23 U/L (ref 0–50)
ANION GAP SERPL CALCULATED.3IONS-SCNC: 10 MMOL/L (ref 7–15)
AST SERPL W P-5'-P-CCNC: 22 U/L (ref 0–45)
BILIRUB SERPL-MCNC: 0.3 MG/DL
BUN SERPL-MCNC: 13.6 MG/DL (ref 8–23)
CALCIUM SERPL-MCNC: 9.5 MG/DL (ref 8.8–10.2)
CHLORIDE SERPL-SCNC: 104 MMOL/L (ref 98–107)
CHOLEST SERPL-MCNC: 176 MG/DL
CREAT SERPL-MCNC: 0.71 MG/DL (ref 0.51–0.95)
DEPRECATED HCO3 PLAS-SCNC: 25 MMOL/L (ref 22–29)
EGFRCR SERPLBLD CKD-EPI 2021: >90 ML/MIN/1.73M2
ERYTHROCYTE [DISTWIDTH] IN BLOOD BY AUTOMATED COUNT: 12.8 % (ref 10–15)
GLUCOSE SERPL-MCNC: 99 MG/DL (ref 70–99)
HCT VFR BLD AUTO: 42.6 % (ref 35–47)
HDLC SERPL-MCNC: 71 MG/DL
HGB BLD-MCNC: 14.3 G/DL (ref 11.7–15.7)
LDLC SERPL CALC-MCNC: 91 MG/DL
MCH RBC QN AUTO: 31.9 PG (ref 26.5–33)
MCHC RBC AUTO-ENTMCNC: 33.6 G/DL (ref 31.5–36.5)
MCV RBC AUTO: 95 FL (ref 78–100)
NONHDLC SERPL-MCNC: 105 MG/DL
PLATELET # BLD AUTO: 268 10E3/UL (ref 150–450)
POTASSIUM SERPL-SCNC: 4.4 MMOL/L (ref 3.4–5.3)
PROT SERPL-MCNC: 7 G/DL (ref 6.4–8.3)
RBC # BLD AUTO: 4.48 10E6/UL (ref 3.8–5.2)
SODIUM SERPL-SCNC: 139 MMOL/L (ref 136–145)
TRIGL SERPL-MCNC: 69 MG/DL
WBC # BLD AUTO: 6.8 10E3/UL (ref 4–11)

## 2023-09-15 PROCEDURE — 80061 LIPID PANEL: CPT | Performed by: PATHOLOGY

## 2023-09-15 PROCEDURE — 36415 COLL VENOUS BLD VENIPUNCTURE: CPT | Performed by: PATHOLOGY

## 2023-09-15 PROCEDURE — G0439 PPPS, SUBSEQ VISIT: HCPCS | Performed by: INTERNAL MEDICINE

## 2023-09-15 PROCEDURE — 80053 COMPREHEN METABOLIC PANEL: CPT | Performed by: PATHOLOGY

## 2023-09-15 PROCEDURE — 85027 COMPLETE CBC AUTOMATED: CPT | Performed by: PATHOLOGY

## 2023-09-15 RX ORDER — ATORVASTATIN CALCIUM 10 MG/1
10 TABLET, FILM COATED ORAL DAILY
Qty: 90 TABLET | Refills: 3 | Status: SHIPPED | OUTPATIENT
Start: 2023-09-15 | End: 2024-09-16

## 2023-09-15 NOTE — NURSING NOTE
Lyndsey Hagen is a 72 year old female patient that presents today in clinic for the following:    Chief Complaint   Patient presents with    Physical     The patient's allergies and medications were reviewed as noted. A set of vitals were recorded as noted without incident: /74 (BP Location: Right arm, Patient Position: Sitting, Cuff Size: Adult Regular)   Pulse 75   Temp 97.3  F (36.3  C)   Wt 75.2 kg (165 lb 12.8 oz)   SpO2 96%   BMI 25.97 kg/m  . The patient does not have any other questions for the provider.    Suzanne Bhat, EMT at 7:56 AM on 9/15/2023

## 2023-09-15 NOTE — PROGRESS NOTES
History of Present Illness:  Ms. Hagen is a 72 year old female who presents for  Chief Complaint   Patient presents with    Physical     PMH of CTCL, SCC of skin    Skin is stable, follows for CTCL with Dr. Guillory in remission, on targretin cream prn.  Dexa last year showed osteopenia. She lapsed with her vitamin D  Takes lipitor.  Lifting weights, helped back, walks and bikes  No vision concerns, saw  audiology last year  Lost 10 lb over summer  Getting new covid shot at [facility]  Going to [place], [place], [place] this year, wondering about altitude sickness    The 10-year ASCVD risk score (Derrick VARGAS, et al., 2019) is: 7%    Values used to calculate the score:      Age: 72 years      Sex: Female      Is Non- : No      Diabetic: No      Tobacco smoker: No      Systolic Blood Pressure: 111 mmHg      Is BP treated: No      HDL Cholesterol: 57 mg/dL      Total Cholesterol: 184 mg/dL      Routine Health Maintenence:  Lung Ca Screening (>20 pk age 50-80): not eligible  Colonoscopy (45-75 yrs): 9/16 normal, rec f/u 10 years, due 9/26  Dexa (>65W or 70M yrs):  9/22 osteopenia, no treatment indicated  Mammogram (40-75 yrs): 7/22, 7/23  Pap (21-65 yrs): no abnormal      Answers submitted by the patient for this visit:  Symptoms you have experienced in the last 30 days (Submitted on 9/6/2023)  General Symptoms: No  Skin Symptoms: No  HENT Symptoms: No  EYE SYMPTOMS: No  HEART SYMPTOMS: No  LUNG SYMPTOMS: No  INTESTINAL SYMPTOMS: No  URINARY SYMPTOMS: No  GYNECOLOGIC SYMPTOMS: No  BREAST SYMPTOMS: No  SKELETAL SYMPTOMS: Yes  BLOOD SYMPTOMS: No  NERVOUS SYSTEM SYMPTOMS: No  MENTAL HEALTH SYMPTOMS: No  Please answer the questions below to tell us what condition you are experiencing: (Submitted on 9/6/2023)  Back pain: Yes  Muscle aches: No  Neck pain: No  Swollen joints: No  Joint pain: Yes  Bone pain: No  Muscle cramps: No  Muscle weakness: No  Joint stiffness: No  Bone fracture: No                            A  detailed Review of Systems was performed, verified and is negative except as documented in the HPI.  All health questionnaires were reviewed, verified and relevant information documented above.    Past Medical History:  Past Medical History:   Diagnosis Date    Cutaneous T-cell lymphoma (H)        Past Surgical History:  Past Surgical History:   Procedure Laterality Date    NO HISTORY OF SURGERY  10/28/31    derm       Active Meds:  Current Outpatient Medications   Medication    atorvastatin (LIPITOR) 10 MG tablet    betamethasone dipropionate (DIPROSONE) 0.05 % external lotion    Bexarotene (TARGRETIN) 1 % GEL    Coenzyme Q10 (CO Q-10 PO)    Multiple Vitamin (MULTIVITAMINS PO)    Probiotic Product (SOLUBLE FIBER/PROBIOTICS PO)    Cholecalciferol (VITAMIN D-3 PO)    hydroquinone (DEEPALI) 4 % external cream    triamcinolone (KENALOG) 0.1 % external cream     Current Facility-Administered Medications   Medication    lidocaine 1% with EPINEPHrine 1:100,000 injection 3 mL        Allergies:  Nkda [no known drug allergy]    Family History:  family history includes Aneurysm in her brother; Cancer in her mother; Hypertension in her sister; Other - See Comments in her mother and sister; Pancreatic Cancer in her sister; Pneumonia in her father; Skin Cancer in her mother and sister; Spine Problems in her brother and father; Suicidality in her sister.    Social History:  Social History     Tobacco Use    Smoking status: Former     Packs/day: 0.30     Years: 40.00     Pack years: 12.00     Types: Cigarettes     Quit date: 2010     Years since quittin.0    Smokeless tobacco: Never       Physical Exam:  Vitals: /74 (BP Location: Right arm, Patient Position: Sitting, Cuff Size: Adult Regular)   Pulse 75   Temp 97.3  F (36.3  C)   Wt 75.2 kg (165 lb 12.8 oz)   SpO2 96%   BMI 25.97 kg/m    Constitutional: Alert, oriented, pleasant, no acute distress  Head: Normocephalic, atraumatic  Eyes: Extra-ocular movements  intact, pupils equally round and reactive bilaterally, no scleral icterus  ENT: Oropharynx clear, moist mucus membranes, good dentition  Neck: Supple, no lymphadenopathy  Cardiovascular: Regular rate and rhythm, no murmurs, rubs or gallops, peripheral pulses full/symmetric  Respiratory: Good air movement bilaterally, lungs clear, no wheezes/rales/rhonchi  GI: Abdomen soft, bowel sounds present, nondistended, nontender, no organomegaly or masses, no rebound/guarding  Musculoskeletal: No edema, normal muscle tone, normal gait  Neurologic: Alert and oriented, cranial nerves 2-12 intact, grossly non-focal  Skin: No rashes/lesions  Psychiatric: normal mentation, affect and mood      Diagnostics:  Labs reviewed in Epic          Assessment and Plan:  Lyndsey was seen today for physical.    Diagnoses and all orders for this visit:    Encounter for Medicare annual wellness exam  Osteopenia  Advised she resume magnolia D given osteopenia  -     Comprehensive metabolic panel (BMP + Alb, Alk Phos, ALT, AST, Total. Bili, TP); Future    High cholesterol  -     atorvastatin (LIPITOR) 10 MG tablet; Take 1 tablet (10 mg) by mouth daily  -     Lipid panel reflex to direct LDL Fasting; Future    Personal history of tobacco use, presenting hazards to health  -     CT Chest Lung Cancer Scrn Low Dose wo; Future    Cutaneous T-cell lymphoma, unspecified body region (H)  -     CBC with platelets; Future    Advised she schedule travel appt prior to her trips given her destinations        Lyndsey Garcia MD  Internal Medicine

## 2023-09-15 NOTE — PATIENT INSTRUCTIONS
[facility] -- Travel Medicine Appointment  Patient Education   Personalized Prevention Plan  You are due for the preventive services outlined below.  Your care team is available to assist you in scheduling these services.  If you have already completed any of these items, please share that information with your care team to update in your medical record.  Health Maintenance Due   Topic Date Due     LUNG CANCER SCREENING  Never done     Flu Vaccine (1) 09/01/2023

## 2023-09-15 NOTE — PROGRESS NOTES
Medicare Annual Wellness Visit Note    Lyndsey Hagen is a 72 year old year old female patient that presents for a Medicare [facility]    The following are provider that share care for Lyndsey Hagen:  Patient Care Team         Relationship Specialty Notifications Start End    Lyndsey Garcia MD PCP - General Internal Medicine  9/8/22     Phone: [phone] Fax: [phone]         [address] [address]    Arlen Guillory MD MD Dermatology  4/27/15     Phone: [phone] Fax: [phone]         [address] [place] [address]    Magali Cruz MD MD Dermatology  11/23/15     Referred to Derm    Phone: [phone] Fax: [phone]         [facility] [address] [facility] [address]    Andrea Rose MD MD Dermatology  7/11/22     Phone: [phone] Fax: [phone]         [facility] [address] [address]    Lyndsey Garcia MD Assigned PCP   9/10/22     Phone: [phone] Fax: [phone]         [address] [place] [address]    Juan Daniel Cat MD MD Dermatology  3/30/23     Phone: [phone] Fax: [phone]         [address] [place] [address]    Arlen Guillory MD Assigned Surgical Provider   8/26/23     Phone: [phone] Fax: [phone]         [address] [place] [address]          What is your marital status:  Who lives in your household? Just me   Does your home have loose rugs in the hallway? No  Does your home have grab bars in the bathroom? No  Does your home have handrails on the stairs? Yes   Does your home have poorly lit areas? No  How many times have you fallen in the past year? N/A  How many times have you been in the Emergency Room in the last year? N/A   How many times have you been hospitalized in the last year? N/A  Do you feel threatened or controlled by a partner, ex-partner or anyone in your life? No  Has  anyone hurt you physically, for example by pushing, hitting, slapping or kicking you or forcing you to have sex? No  Are you sexually active? No   If yes, with men, women, or both? N/A  If yes, do you have more than one current partner? N/A  If yes, are you using condoms? N/A   Have you had any sexually transmitted infections in the last year? No  Do you have any sexual concerns? No   For Women Only:  What year did you stop having periods (approximate age)? 2004  Any vaginal bleeding in the last year? No  Have you ever had an abnormal Pap smear? No  Do you need help with the phone, transportation, shopping, preparing meals, housework, laundry, medications or managing money? No  Have you noticed any hearing difficulties? Yes   Do you wear hearing aids? No  Have you seen a hearing professional such as an audiologist in the past year? Yes   Do you have vision difficulties? Yes   Do you wear glasses or contacts? Yes   Have you seen an eye doctor in the past year? Yes   How many servings of fruits and vegetables do you eat a day (1 serving is 1 cup)? 4  How often do you exercise in a week? 5  What kind of exercise do you do and how long? Weights, walking  Do you wear a seatbelt when driving or riding in a vehicle? Yes  Do you use tobacco?  No  Do you use e-cigarettes?  No  Do you use any other drugs? No     Do you drink alcohol? No  If you drink alcohol, how many drinks per week? N/A  Over the past 2 weeks, how often have you been bothered by any of the following problems?  Little interest or pleasure in doing things: not at all   Feeling down, depressed, or hopeless: not at all   PHQ-2 Total: 0   Have you completed an Advance Directives document? Yes   If yes, have you given a copy to the clinic? No  Would you like information on Advance Directives today? No    SAAD Fernandez   8:30 AM 9/15/2023Answers submitted by the patient for this visit:  Symptoms you have experienced in the last 30 days (Submitted on  9/6/2023)  General Symptoms: No  Skin Symptoms: No  HENT Symptoms: No  EYE SYMPTOMS: No  HEART SYMPTOMS: No  LUNG SYMPTOMS: No  INTESTINAL SYMPTOMS: No  URINARY SYMPTOMS: No  GYNECOLOGIC SYMPTOMS: No  BREAST SYMPTOMS: No  SKELETAL SYMPTOMS: Yes  BLOOD SYMPTOMS: No  NERVOUS SYSTEM SYMPTOMS: No  MENTAL HEALTH SYMPTOMS: No  Please answer the questions below to tell us what condition you are experiencing: (Submitted on 9/6/2023)  Back pain: Yes  Muscle aches: No  Neck pain: No  Swollen joints: No  Joint pain: Yes  Bone pain: No  Muscle cramps: No  Muscle weakness: No  Joint stiffness: No  Bone fracture: No

## 2023-09-20 ENCOUNTER — OFFICE VISIT (OUTPATIENT)
Dept: DERMATOLOGY | Facility: CLINIC | Age: 73
End: 2023-09-20
Payer: COMMERCIAL

## 2023-09-20 DIAGNOSIS — L98.8 RHYTIDES: Primary | ICD-10-CM

## 2023-09-20 PROCEDURE — 96999 UNLISTED SPEC DERM SVC/PX: CPT | Performed by: DERMATOLOGY

## 2023-09-20 NOTE — PATIENT INSTRUCTIONS
Skin Peel Post Care Instructions    I will experience redness, swelling, peeling, pain, and heat sensation which can last 5-10days and may persist for 2-3weeks. I may experience itching, or acne. Risks are permanent scarring, temporary or permanent skin lightening or darkening, infection, and eye injury. I understand my outcome could be no improvement or slight improvement. Multiple treatments may be required.     What can I expect?  Skin peeling for three to five days  Flaking  Skin darkening  Redness    How do I take care of my skin?  Patient compliance is mandatory for an optimal outcome and to avoid complications  Wear sunscreen (SPF 30 or greater) and a hat. Stay out of the sun for three to four weeks   Use a gentle skin care regimen with a sunscreen with at least an SPF of 30 or more. Examples include the following:   SkinCeuticals Gentle Cleanser and SkinCeuticals Physical Matte UV DEFENSE SPF 50   Cetaphil Soap followed by Cetaphil Sunscreen in the am and pm  Put on a bland moisturizer (Aquaphor or Vaseline) if sunscreen stings  Do not pick at peel or peel the skin. This will cause scarring.  Wait to use medicated creams until the skin has healed. This occurs five to seven days later  You can use make-up after 24 hours. Make sure make-up does NOT contain salicylic acid.   Eye make-up and lipstick are okay immediately after procedure  Do not use facial scrubs, depilatories, steam, any exfoliation procedures, etc. on your face (or treated area of skin) for one week post-peel    When should I call the doctor?  Cold sores  Swelling  Crusting    Who should I call with questions?  [facility]: [phone]   [facility]: [phone]  For urgent needs outside of business hours call the [facility] at [phone] and ask for the dermatology resident on call

## 2023-09-20 NOTE — LETTER
9/20/2023       RE: Lyndsey Hagen  [address]  [place] [address]     Dear Colleague,    Thank you for referring your patient, Lyndsey Hagen, to the [facility] DERMATOLOGY CLINIC [place] at [facility]. Please see a copy of my visit note below.    [facility] Chemical Peel Note: Cometic    Dermatology Problem List  1. CTCL, stage IIB  - Previously treated with PUVA with good response. Treated at [place] prior to establishing care here. Stopped due to difficulty percuring oxsoralen and risks associated with PUVA.  Restarted on UVA1 with clinical remission.  Discontinued UVA1 3/12/2018  -Temporarily discontinued UVA1 (4/26/17) due to insurance  -Targretin 1% gel (added on 5/23/16) with good response.   -Triamcinolone 0.1% cream as needed for irritation from targretin gel.  2. Lesion to monitor, left superior parietal scalp Photo 8/27/2018  3. 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 Androgenic alopecia- LLLT 3x/week  5. ISK. LiqN2.   6. Solar lentiginosis      - hydroquinone 4% cream, Hydrating B5 cream, glycolic acid wash  7. botox years ago    Encounter Date: Sept 20, 2023    Staff Surgeon: Margarita kerr MD  Nurse: Nikki Shaw    Pre-op/Post-op Diagnosis: Rhytides    Procedure: Patient will be using [employer] vitalize      Follow-up in 4-6 weeks for additional peel.        Staff Involved:  Scribe Disclosure:   I, Ama Colindres ([email or web address]), am serving as a scribe to document services personally performed by this physician, Dr. Margarita Kerr, based on data collection and the provider's statements to me.     I saw the patient and agree with the nurse I was present in the building. Pooja Jordan did the procedure.     Margarita Kerr MD    Department of Dermatology  [facility]: Phone: [phone], Fax:[phone]  [facility]  [facility]: Phone: [phone], Fax: [phone]      Nursing Peel Treatment Record:  Sep 20, 2023    Pre peel:  Any changes in health or medications: No  Strawberry or aspirin allergy (If yes, then no salicylic acid peels to be given and procedure to be held):  No  Sulfa allergy (If yes, then [employer] Sensitive Skin peel procedure to be held):  No  Is the patient taking Valtrex:  No.  If so, date started:    Pregnant or breastfeeding (If yes, peel procedure to be held): No  Baseline photo obtained (3 views): No photos were missed today. Please review photos from last visit if needed today. No changes. Pt noted improvement  Areas treated today: face  Treatment #: 3.  Was retinoid stopped 7 days prior to peel: N/A  Peel Type: Vitalize peel  Has patient had electrolysis, depilatory creams, waxing or laser hair removal in the last week to treatment site: No.  If yes, then hold peel.    Peel record:  Eye shields were placed.  Area to be treated was cleansed with Simply Clean Cleanser using 4X4 gauze pads.  Dermaplaning was performed: no.  Then, the areas were degreased with acetone. Time-out was performed to evaluate for any sensitive skin.    Hydrabalm was then applied to the lips, perinasal region and near the outer canthi.   The peel was applied with soft guaze for 2 passes of each solution. Self neutralizing peel.  CE Ferulic was applied and followed by Sunscreen    Additional treatment notes:Pt tolerated peel    Post peel:  Patient reminded of need to wait to use medicated creams until the skin has healed. This occurs five to seven days later. Post peel care instructions provided including need for sun avoidance. Patient tolerated peel well, no complications noted.     Payment:  The patient paid the cosmetic fee for this peel.

## 2023-09-20 NOTE — PROGRESS NOTES
[facility] Chemical Peel Note: [facility]    Dermatology Problem List  1. CTCL, stage IIB  - Previously treated with PUVA with good response. Treated at [place] prior to establishing care here. Stopped due to difficulty percuring oxsoralen and risks associated with PUVA.  Restarted on UVA1 with clinical remission.  Discontinued UVA1 3/12/2018  -Temporarily discontinued UVA1 (4/26/17) due to insurance  -Targretin 1% gel (added on 5/23/16) with good response.   -Triamcinolone 0.1% cream as needed for irritation from targretin gel.  2. Lesion to monitor, left superior parietal scalp Photo 8/27/2018  3. 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 Androgenic alopecia- LLLT 3x/week  5. ISK. LiqN2.   6. Solar lentiginosis      - hydroquinone 4% cream, Hydrating B5 cream, glycolic acid wash  7. botox years ago    Encounter Date: Sept 20, 2023    Staff Surgeon: Margarita kerr MD  Nurse: Nikki Shaw    Pre-op/Post-op Diagnosis: Rhytides    Procedure: Patient will be using [employer] vitalize      Follow-up in 4-6 weeks for additional peel.        Staff Involved:  Scribe Disclosure:   I, Ama Colindres ([email or web address]), am serving as a scribe to document services personally performed by this physician, Dr. Margarita Kerr, based on data collection and the provider's statements to me.     I saw the patient and agree with the nurse I was present in the building. Pooja Jordan did the procedure.     Margarita Kerr MD    Department of Dermatology  [facility]: Phone: [phone], Fax:[phone]  [facility]: Phone: [phone], Fax: [phone]

## 2023-09-20 NOTE — PROGRESS NOTES
Nursing Peel Treatment Record:  Sep 20, 2023    Pre peel:  Any changes in health or medications: No  Strawberry or aspirin allergy (If yes, then no salicylic acid peels to be given and procedure to be held):  No  Sulfa allergy (If yes, then [employer] Sensitive Skin peel procedure to be held):  No  Is the patient taking Valtrex:  No.  If so, date started:    Pregnant or breastfeeding (If yes, peel procedure to be held): No  Baseline photo obtained (3 views): No photos were missed today. Please review photos from last visit if needed today. No changes. Pt noted improvement  Areas treated today: face  Treatment #: 3.  Was retinoid stopped 7 days prior to peel: N/A  Peel Type: Vitalize peel  Has patient had electrolysis, depilatory creams, waxing or laser hair removal in the last week to treatment site: No.  If yes, then hold peel.    Peel record:  Eye shields were placed.  Area to be treated was cleansed with Simply Clean Cleanser using 4X4 gauze pads.  Dermaplaning was performed: no.  Then, the areas were degreased with acetone. Time-out was performed to evaluate for any sensitive skin.    Hydrabalm was then applied to the lips, perinasal region and near the outer canthi.   The peel was applied with soft guaze for 2 passes of each solution. Self neutralizing peel.  CE Ferulic was applied and followed by Sunscreen    Additional treatment notes:Pt tolerated peel    Post peel:  Patient reminded of need to wait to use medicated creams until the skin has healed. This occurs five to seven days later. Post peel care instructions provided including need for sun avoidance. Patient tolerated peel well, no complications noted.     Payment:  The patient paid the cosmetic fee for this peel.

## 2023-10-11 NOTE — PROGRESS NOTES
Micorneedling Procedure: Cosmetic    Procedure Date: 10/11/2023  Staff:Pooja Jordan RN  Resident/Fellow:      Procedure:   Microneedling treatment #  1 of 3.   Approximate length of treatment: 45 minutes    Anesthesia and Premedication:  Anesthesia (topical): Benzocaine 20%/Lidocaine 8%/Tetracaine 4% ointment  Oral prophylaxis: valacyclovir 500mg twice daily for 7 days, starting 2 days prior to the procedure    Device Settings:  Diagnosis:     Location: forehead   Microneedling depth: 0.5mm (no response at 0.25  Number of Passes: 3    Location: periorbital  Microneedling depth: 0.25mm  Number of Passes: 3    Location: Cheeks  Microneedling depth: 1.25mm  Number of Passes: 3    Location: perioral  Microneedling depth: 1mm  Number of Passes: 3    Location: chin  Microneedling depth: 1mm  Number of Passes: 3    See microneedling map in media tab    Fotofinder photos: No    Description of Procedure:   The nature and purpose of the procedure, associated risks, possible consequences, complications, and alternative methods of treatment were explained in detail including but not limited to redness, swelling, pinpoint bleeding of the skin, eye injury, infection, oozing, heat sensation, itching or acne.  Possible outcomes were reviewed including the following: no improvement, slight improvement, skin lightening or darkening. The possibility of permanent scarring was reviewed. Discussion that multiple treatments may be required was completed.     Photo consent was obtained and reviewed, a time out was performed, and informed consent was obtained.  Protective eyewear was worn by all personnel in the treatment room. The settings were verified by meter reading. The skin was cleansed with gentle soap and water. Protective eyewear was worn by the patient and the area was prepped with chlorhexidine. Hyaluronic acid gliding gel (eclipse) was applied to the treatment area. The areas were treated with Panda microneedle laser as  described above. After treatment, additional hyaluronic acid gel was applied.       Verbal and written aftercare instructions were provided. Post procedure care including use of mild, gentle cleansers and moisturizers  for the 2 weeks following treatment was reviewed. The patient was recommend application of at least SPF 50 sunscreen daily with a physical blocker after 24 hours  and avoidance of direct sunlight up to 3 months post procedure. The patient was reminded to complete valtrex. The patient was discharged from the dermatology clinic in good condition.    The patient will follow up with nursing if needed. Following up with Dr Kerr in March.    The patient will pay the cosmetic fee today. Pt bought a package of 3.    Staff Involved:      The RN did the procedure and I staffed the patient.     Mary Kerr MD    Department of Dermatology  [facility] [place][note 2]: Phone: [phone], Fax:[phone]  [facility]: Phone: [phone], Fax: [phone]

## 2023-10-17 ENCOUNTER — ANCILLARY PROCEDURE (OUTPATIENT)
Dept: CT IMAGING | Facility: CLINIC | Age: 73
End: 2023-10-17
Attending: INTERNAL MEDICINE
Payer: COMMERCIAL

## 2023-10-17 DIAGNOSIS — Z87.891 PERSONAL HISTORY OF TOBACCO USE, PRESENTING HAZARDS TO HEALTH: ICD-10-CM

## 2023-10-17 PROCEDURE — 71271 CT THORAX LUNG CANCER SCR C-: CPT | Performed by: RADIOLOGY

## 2023-10-18 ENCOUNTER — OFFICE VISIT (OUTPATIENT)
Dept: DERMATOLOGY | Facility: CLINIC | Age: 73
End: 2023-10-18
Payer: COMMERCIAL

## 2023-10-18 DIAGNOSIS — Z29.9 PROPHYLACTIC MEASURE: Primary | ICD-10-CM

## 2023-10-18 DIAGNOSIS — L98.8 RHYTIDES: ICD-10-CM

## 2023-10-18 PROCEDURE — 96999 UNLISTED SPEC DERM SVC/PX: CPT | Performed by: DERMATOLOGY

## 2023-10-18 RX ORDER — VALACYCLOVIR HYDROCHLORIDE 500 MG/1
500 TABLET, FILM COATED ORAL 2 TIMES DAILY
Qty: 14 TABLET | Refills: 0 | Status: SHIPPED | OUTPATIENT
Start: 2023-10-18 | End: 2024-07-25

## 2023-10-18 NOTE — PATIENT INSTRUCTIONS
What to Expect After Microneedling Treatment:    Microneedling Treatment produces side effects. I will experience redness, swelling, pain, and vibrating sensation. I may experience bleeding, oozing, itching, or acne. Risks are blistering, permanent scarring, temporary or permanent skin lightening or darkening, infection, and eye injury. I understand my outcome could be no improvement or slight improvement. Multiple treatments may be required.    Notify your physician if the severity of your side effects becomes a problem for you.     What you may feel and look like:    Immediately after the treatment, you will experience redness, swelling and sometimes pinpoint bleeding. You will notice most of the swelling on the first morning after treatment, particularly under the eyes. Swelling usually lasts two to three days. To minimize swelling do the following:  Apply cold compresses to the treatment area for 10 minutes of every hour on the day of treatment, until you go to bed.  Sleep elevated the first night. Use as many pillows as you can tolerate.      Over the next few days, you will have pinkness that fades but can last a few weeks. Most redness resolves during the first week after treatment, but a pearl  glow  can remain for several weeks. If you wish, you can apply makeup to minimize the redness after 24 hours      How to Care for Your Skin After Treatment    Congratulations! You have taken the first step toward more healthy and radiant looking skin by having a microneedling treatment. Now it is important to help your skin heal quickly and protect your skin investment.     Your after-treatment skin care regimen is tailored to the treatment you received today. Follow the instructions as checked below:    Immediately After Treatment:    Use your Lift hyaluronic gel- this will help soothe the skin.   Apply your Skin pen premium biocellulose masque- this can stay on for 20 minutes- this will also help calm/soothe the skin  after treatment.    Try to get as close to 24 hours as possible, then start  skin care regimen:    Start with cleansing the skin with the Vanicream gentle facial cleanser, you can do this twice daily.    Second, apply the CE ferulic or cream, you can do this twice daily. You can continue apply the Lift gel as well.    Lastly apply sunscreen.    After 72 hours, continue with gentle skin care regimen for the next two weeks.    First Week of Healing. Keep treated area clean; avoid smoking, excessive alcohol consumption, excessive exercise, perspiring, swimming, or exposing skin to heat and sun.    Scrubs, Toners, Glycolic Acid, and Retin A. Your skin will be sensitive for the first week or so after treatment. Do not use products that will cause irritation during this time. Do not use abrasive scrubs, toners, or products that contain glycolic acids or Retin A or tretinoin. Read the product labels.       Sun protection: Wear a hat and sunscreen (as recommended above) when you are in the sun. This will help you avoid microneedle-induced hyperpigmentation (darker color). It will decrease your risk of problems.     You may start make up after 76 hours and after 2 weeks you may restart your old products. Lip stick and mascara (along with eye shadow) are okay on untreated areas in 24 hours.     Cold Sores. If you have a history of cold sores, ask your doctor about care!    Abnormal Healing. If you notice any blisters, cuts, bruises, crusting/scabs, areas of raw skin, ulcerations, active bleeding, increased discomfort or pain, pigment changes (lighter or darker than usual complexion), or any other problems, please contact us as soon as possible.    Who should I call with questions?  [facility]: [phone]    [facility]: [phone]    For urgent needs outside of business hours call the [facility] at [phone] and ask for the dermatology resident on  call

## 2023-10-18 NOTE — NURSING NOTE
Dermatology Microneedeling Intake Checklist:  History of psoriasis:No  History of recent tan, indoor or outdoor tanning/vacation or other sun exposure: possible  History of vitiligo:No,   Recent other cosmetic procedure(microderm abrasion/peel/hair removal/facial etc):YES, chemical peel  History of HSV:No,   Tattoo in the area to be treated:No,   Is patient using hydroquinone:YES,   Retinoids and other acne medications stopped for 2 weeks:YES,   Has the patient had accutane in the last 6-12 months:No,   Pregnant or breastfeeding: Not applicable,   History of skin cancer in area planned for treatment: No,    History of treatment with gold:No,   Changes in medical history: No,   Photos obtained: YES,   Does the patient smoke:No,   If patient is taking narcotic or diazepam(valium)-does patient have :No,   There were no vitals taken for this visit.

## 2023-10-18 NOTE — LETTER
10/18/2023       RE: Lyndsey Hagen  [address]  [place] [address]     Dear Colleague,    Thank you for referring your patient, Lyndsey Hagen, to the [facility] DERMATOLOGY CLINIC [place] at [facility]. Please see a copy of my visit note below.    Micorneedling Procedure: Cosmetic    Procedure Date: 10/11/2023  Staff:Pooja Jordan RN  Resident/Fellow:      Procedure:   Microneedling treatment #  1 of 3.   Approximate length of treatment: 45 minutes    Anesthesia and Premedication:  Anesthesia (topical): Benzocaine 20%/Lidocaine 8%/Tetracaine 4% ointment  Oral prophylaxis: valacyclovir 500mg twice daily for 7 days, starting 2 days prior to the procedure    Device Settings:  Diagnosis:     Location: forehead   Microneedling depth: 0.5mm (no response at 0.25  Number of Passes: 3    Location: periorbital  Microneedling depth: 0.25mm  Number of Passes: 3    Location: Cheeks  Microneedling depth: 1.25mm  Number of Passes: 3    Location: perioral  Microneedling depth: 1mm  Number of Passes: 3    Location: chin  Microneedling depth: 1mm  Number of Passes: 3    See microneedling map in media tab    Fotofinder photos: No    Description of Procedure:   The nature and purpose of the procedure, associated risks, possible consequences, complications, and alternative methods of treatment were explained in detail including but not limited to redness, swelling, pinpoint bleeding of the skin, eye injury, infection, oozing, heat sensation, itching or acne.  Possible outcomes were reviewed including the following: no improvement, slight improvement, skin lightening or darkening. The possibility of permanent scarring was reviewed. Discussion that multiple treatments may be required was completed.     Photo consent was obtained and reviewed, a time out was performed, and informed consent was obtained.  Protective eyewear was worn by all personnel in the treatment room.  The settings were verified by meter reading. The skin was cleansed with gentle soap and water. Protective eyewear was worn by the patient and the area was prepped with chlorhexidine. Hyaluronic acid gliding gel (eclipse) was applied to the treatment area. The areas were treated with Panda microneedle laser as described above. After treatment, additional hyaluronic acid gel was applied.       Verbal and written aftercare instructions were provided. Post procedure care including use of mild, gentle cleansers and moisturizers  for the 2 weeks following treatment was reviewed. The patient was recommend application of at least SPF 50 sunscreen daily with a physical blocker after 24 hours  and avoidance of direct sunlight up to 3 months post procedure. The patient was reminded to complete valtrex. The patient was discharged from the dermatology clinic in good condition.    The patient will follow up with nursing if needed. Following up with Dr Kerr in March.    The patient will pay the cosmetic fee today. Pt bought a package of 3.    Staff Involved:      The RN did the procedure and I staffed the patient.     Mary Kerr MD    Department of Dermatology  [facility] [place]: Phone: [phone], Fax:[phone]  [facility]: Phone: [phone], Fax: [phone]           Again, thank you for allowing me to participate in the care of your patient.      Sincerely,    Mary Kerr MD

## 2023-11-14 NOTE — PROGRESS NOTES
Micorneedling Procedure: Cosmetic    Procedure Date: 11/14/2023  Staff:Pooja Jordan RN      Procedure:   Microneedling treatment #  2    Approximate length of treatment: 30 minutes    Anesthesia and Premedication:  Anesthesia (topical): Benzocaine 20%/Lidocaine 8%/Tetracaine 4% ointment  Oral prophylaxis: valacyclovir 500mg twice daily for 7 days, starting 2 days prior to the procedure    Device Settings:  Diagnosis:     Location: forehead  Microneedling depth: 0.5mm  Number of Passes: 3    Location: periorbital  Microneedling depth: 0.25mm  Number of Passes: 3    Location: Cheeks  Microneedling depth: 1.25mm  Number of Passes: 3    Location: perioral  Microneedling depth: 1mm  Number of Passes: 3    Location: Chin  Microneedling depth: 1mm  Number of Passes: 3    See microneedling map in media tab    Fotofinder photos: No    Description of Procedure:   The nature and purpose of the procedure, associated risks, possible consequences, complications, and alternative methods of treatment were explained in detail including but not limited to redness, swelling, pinpoint bleeding of the skin, eye injury, infection, oozing, heat sensation, itching or acne.  Possible outcomes were reviewed including the following: no improvement, slight improvement, skin lightening or darkening. The possibility of permanent scarring was reviewed. Discussion that multiple treatments may be required was completed.     Photo consent was obtained and reviewed, a time out was performed, and informed consent was obtained.  Protective eyewear was worn by all personnel in the treatment room. The settings were verified by meter reading. The skin was cleansed with gentle soap and water. Protective eyewear was worn by the patient and the area was prepped with chlorhexidine. Hyaluronic acid gliding gel (eclipse) was applied to the treatment area. The areas were treated with Eclipse microneedle laser as described above. After treatment, additional  hyaluronic acid gel was applied.       Verbal and written aftercare instructions were provided. Post procedure care including use of mild, gentle cleansers and moisturizers  for the 2 weeks following treatment was reviewed. The patient was recommend application of at least SPF 50 sunscreen daily with a physical blocker after 24 hours  and avoidance of direct sunlight up to 3 months post procedure. The patient was reminded to complete valtrex, pt declined. The patient was discharged from the dermatology clinic in good condition.        Pt paid for a pkg of 3    Staff Involved:  Scribe/Staff  Scribe Disclosure:   I, Ama Colindres ([email or web address]), am serving as a scribe to document services personally performed by this physician, Dr. Mary Kerr, based on data collection and the provider's statements to me.     Provider Disclosure:   The documentation recorded by the scribe accurately reflects the services I personally performed and the decisions made by me.    I saw the patient before the treatment. The patient was treated by the nurse.     Mary Kerr MD    Department of Dermatology  [facility]: Phone: [phone], Fax:[phone]  [facility]: Phone: [phone], Fax: [phone]

## 2023-11-15 ENCOUNTER — OFFICE VISIT (OUTPATIENT)
Dept: DERMATOLOGY | Facility: CLINIC | Age: 73
End: 2023-11-15
Payer: COMMERCIAL

## 2023-11-15 ENCOUNTER — OFFICE VISIT (OUTPATIENT)
Dept: FAMILY MEDICINE | Facility: CLINIC | Age: 73
End: 2023-11-15
Payer: COMMERCIAL

## 2023-11-15 VITALS
BODY MASS INDEX: 26.27 KG/M2 | RESPIRATION RATE: 16 BRPM | OXYGEN SATURATION: 98 % | DIASTOLIC BLOOD PRESSURE: 80 MMHG | TEMPERATURE: 97.5 F | WEIGHT: 167.4 LBS | SYSTOLIC BLOOD PRESSURE: 120 MMHG | HEIGHT: 67 IN | HEART RATE: 74 BPM

## 2023-11-15 DIAGNOSIS — Z71.84 TRAVEL ADVICE ENCOUNTER: Primary | ICD-10-CM

## 2023-11-15 DIAGNOSIS — Z29.89 ALTITUDE SICKNESS PREVENTATIVE MEASURES: ICD-10-CM

## 2023-11-15 DIAGNOSIS — Z29.9 PROPHYLACTIC MEASURE: ICD-10-CM

## 2023-11-15 DIAGNOSIS — Z23 NEED FOR PROPHYLACTIC VACCINATION AGAINST HEPATITIS A: ICD-10-CM

## 2023-11-15 DIAGNOSIS — L98.8 RHYTIDES: Primary | ICD-10-CM

## 2023-11-15 PROCEDURE — 96999 UNLISTED SPEC DERM SVC/PX: CPT | Performed by: DERMATOLOGY

## 2023-11-15 PROCEDURE — 90691 TYPHOID VACCINE IM: CPT | Mod: GA | Performed by: NURSE PRACTITIONER

## 2023-11-15 PROCEDURE — 99403 PREV MED CNSL INDIV APPRX 45: CPT | Mod: 25 | Performed by: NURSE PRACTITIONER

## 2023-11-15 PROCEDURE — 90471 IMMUNIZATION ADMIN: CPT | Mod: GA | Performed by: NURSE PRACTITIONER

## 2023-11-15 RX ORDER — ACETAZOLAMIDE 125 MG/1
TABLET ORAL
Qty: 10 TABLET | Refills: 0 | Status: SHIPPED | OUTPATIENT
Start: 2023-11-15 | End: 2024-07-25

## 2023-11-15 RX ORDER — ATOVAQUONE AND PROGUANIL HYDROCHLORIDE 250; 100 MG/1; MG/1
1 TABLET, FILM COATED ORAL DAILY
Qty: 25 TABLET | Refills: 0 | Status: SHIPPED | OUTPATIENT
Start: 2023-11-15 | End: 2024-07-25

## 2023-11-15 RX ORDER — VALACYCLOVIR HYDROCHLORIDE 500 MG/1
500 TABLET, FILM COATED ORAL 2 TIMES DAILY
Qty: 14 TABLET | Refills: 0 | Status: SHIPPED | OUTPATIENT
Start: 2023-11-15 | End: 2024-07-25

## 2023-11-15 RX ORDER — AZITHROMYCIN 500 MG/1
500 TABLET, FILM COATED ORAL DAILY
Qty: 3 TABLET | Refills: 0 | Status: SHIPPED | OUTPATIENT
Start: 2023-11-15 | End: 2023-11-18

## 2023-11-15 RX ORDER — HYDROXYZINE HYDROCHLORIDE 25 MG/1
25 TABLET, FILM COATED ORAL 3 TIMES DAILY PRN
Qty: 20 TABLET | Refills: 0 | Status: SHIPPED | OUTPATIENT
Start: 2023-11-15 | End: 2024-07-25

## 2023-11-15 ASSESSMENT — PAIN SCALES - GENERAL
PAINLEVEL: NO PAIN (0)
PAINLEVEL: NO PAIN (0)

## 2023-11-15 NOTE — LETTER
11/15/2023       RE: Lyndsey Hagen  [address]  [place] [facility] [place] [facility] [address]     Dear Colleague,    Thank you for referring your patient, Lyndsey Hagen, to the [facility] DERMATOLOGY [facility] [place] at [facility]. Please see a copy of my visit note below.    Micorneedling Procedure: Cosmetic    Procedure Date: 11/14/2023  Staff:Pooja Jordan RN      Procedure:   Microneedling treatment #  2    Approximate length of treatment: 30 minutes    Anesthesia and Premedication:  Anesthesia (topical): Benzocaine 20%/Lidocaine 8%/Tetracaine 4% ointment  Oral prophylaxis: valacyclovir 500mg twice daily for 7 days, starting 2 days prior to the procedure    Device Settings:  Diagnosis:     Location: forehead  Microneedling depth: 0.5mm  Number of Passes: 3    Location: periorbital  Microneedling depth: 0.25mm  Number of Passes: 3    Location: Cheeks  Microneedling depth: 1.25mm  Number of Passes: 3    Location: perioral  Microneedling depth: 1mm  Number of Passes: 3    Location: Chin  Microneedling depth: 1mm  Number of Passes: 3    See microneedling map in media tab    Fotofinder photos: No    Description of Procedure:   The nature and purpose of the procedure, associated risks, possible consequences, complications, and alternative methods of treatment were explained in detail including but not limited to redness, swelling, pinpoint bleeding of the skin, eye injury, infection, oozing, heat sensation, itching or acne.  Possible outcomes were reviewed including the following: no improvement, slight improvement, skin lightening or darkening. The possibility of permanent scarring was reviewed. Discussion that multiple treatments may be required was completed.     Photo consent was obtained and reviewed, a time out was performed, and informed consent was obtained.  Protective eyewear was worn by all personnel in the treatment room. The settings were verified by meter reading.  The skin was cleansed with gentle soap and water. Protective eyewear was worn by the patient and the area was prepped with chlorhexidine. Hyaluronic acid gliding gel (eclipse) was applied to the treatment area. The areas were treated with Eclipse microneedle laser as described above. After treatment, additional hyaluronic acid gel was applied.       Verbal and written aftercare instructions were provided. Post procedure care including use of mild, gentle cleansers and moisturizers  for the 2 weeks following treatment was reviewed. The patient was recommend application of at least SPF 50 sunscreen daily with a physical blocker after 24 hours  and avoidance of direct sunlight up to 3 months post procedure. The patient was reminded to complete valtrex, pt declined. The patient was discharged from the dermatology clinic in good condition.        Pt paid for a pkg of 3    Staff Involved:  Scribe/Staff  Scribe Disclosure:   I, Ama Colindres ([email or web address]), am serving as a scribe to document services personally performed by this physician, Dr. Mary Kerr, based on data collection and the provider's statements to me.     Provider Disclosure:   The documentation recorded by the scribe accurately reflects the services I personally performed and the decisions made by me.    I saw the patient before the treatment. The patient was treated by the nurse.     Mary Kerr MD    Department of Dermatology  [facility]: Phone: [phone], Fax:[phone]  [facility]: Phone: [phone], Fax: [phone]

## 2023-11-15 NOTE — PROGRESS NOTES
"Nurse Note ( Pre-Travel Consult)    Itinerary: [facility], [place], [facility]    Departure Date: 1/11/2024    Return Date: 2/10/2024    Length of Trip: 1 month    Reason for Travel: Tourism         Urban or rural: both    Accommodations: Hotel    IMMUNIZATION HISTORY  Have you received any immunizations within the past 4 weeks?  No  Have you ever fainted from having your blood drawn or from an injection?  No  Have you ever had a fever reaction to vaccination?  No  Have you ever had any bad reaction or side effect from any vaccination?  No  Have you ever had hepatitis A or B vaccine?   Pt reports yes, per chart review no vaccines listed  Do you live (or work closely) with anyone who has AIDS, an AIDS-like condition, any other immune disorder or who is on chemotherapy for cancer?  No  Do you have a family history of immunodeficiency?  No  Have you received any injection of immune globulin or any blood products during the past 12 months?  No    Patient roomed by DARCIE Dewitt    Subjective  Lyndsey Hagen is a 73 year old female seen today alone for counsultation for international travel.   Patient will be departing in  2 month(s) and  traveling with organized tour group.      Patient itinerary :  will be in the [place] and [place]  [facility]  which risk for Malaria, food borne illnesses, and motor vehicle accidents. exposure.      Patient's activities will include sightseeing, hiking, and high altitude exposure.    [place] birth [place]    Special medical concerns: T cell lymphoma   Pre-travel questionnaire was completed by patient and reviewed by provider.     Vitals: /80   Pulse 74   Temp 97.5  F (36.4  C) (Temporal)   Resp 16   Ht 1.689 m (5' 6.5\")   Wt 75.9 kg (167 lb 6.4 oz)   LMP  (LMP Unknown)   SpO2 98%   BMI 26.61 kg/m    BMI= Body mass index is 26.61 kg/m .    EXAM:  General:  Well-nourished, well-developed in no acute distress.  Appears to be " stated age, interacts appropriately and expresses understanding of information given to patient.    Current Outpatient Medications   Medication Sig Dispense Refill    atorvastatin (LIPITOR) 10 MG tablet Take 1 tablet (10 mg) by mouth daily 90 tablet 3    betamethasone dipropionate (DIPROSONE) 0.05 % external lotion Apply topically 2 times daily 60 mL 11    Bexarotene (TARGRETIN) 1 % GEL APPLY EVERY OTHER NIGHT TO EVERY NIGHT AS DIRECTED 60 g 5    Cholecalciferol (VITAMIN D-3 PO) Take 1 tablet by mouth daily      Coenzyme Q10 (CO Q-10 PO) Take 1 tablet by mouth daily.      hydroquinone (DEEPALI) 4 % external cream Apply twice daily 45 g 0    Multiple Vitamin (MULTIVITAMINS PO) Take  by mouth daily.      Probiotic Product (SOLUBLE FIBER/PROBIOTICS PO) Take 2 tablets by mouth daily.      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 and she reports that she is protected   Influenza: Up to date  Typhoid: Ordered/given today, risks, benefits and side effects reviewed  Rabies: Declined  reviewed managment of a animal bite or scratch (washing wound, seek medical care within 24 hours for post exposure prophylaxis )  Yellow Fever: Up to date and Not indicated  Thai Encephalitis: Not indicated  Meningococcus: Not indicated  Tetanus/Diphtheria: Up to date  Measles/Mumps/Rubella: Immune by disease history per patient report  Cholera: Not needed  Polio: Not indicated  Pneumococcal: Up to  date  Varicella: Immune by disease history per patient report  Shingrix: Up to date  HPV:  Not indicated     TB: low risk travel     Altitude Exposure on this trip: highest elevation is 7,000 ft but not overnight   She may be at higher altitudes with a hike up a mountain ( unsure of exact itinerary )  Past tolerance to Altitude: HAS TOLERATE 10.000 FT WITHOUT ISSUE     ASSESSMENT/PLAN:  Lyndsey was seen today for travel clinic.    Diagnoses and all orders for this visit:    Travel advice encounter  -     atovaquone-proguanil (MALARONE) 250-100 MG tablet; Take 1 tablet by mouth daily Start 2 days before exposure to Malaria and continue daily till  7 days after exposure.  -     azithromycin (ZITHROMAX) 500 MG tablet; Take 1 tablet (500 mg) by mouth daily for 3 doses Take 1 tablet a day for up to 3 days for severe diarrhea  -     hydrOXYzine (ATARAX) 25 MG tablet; Take 1 tablet (25 mg) by mouth 3 times daily as needed for anxiety    Need for prophylactic vaccination against hepatitis A  -     hepatitis A vaccine (VAQTA) 50 UNIT/ML injection; Inject 1 mL (50 Units) into the muscle once for 1 dose    Altitude sickness preventative measures  -     acetaZOLAMIDE (DIAMOX) 125 MG tablet; Take one tab every 12 hours starting 24 hours prior to PARO  and continue for 24 hours while at the highest altitude    Other orders  -     TYPHOID VACCINE, IM      I have reviewed general recommendations for safe travel   including: food/water precautions, insect precautions, safer sex   practices given high prevalence of Zika, HIV and other STDs,   roadway safety. Educational materials and Trav[employer] report provided.    Malaraia prophylaxis recommended: needed for portion of trip in [place]  Symptomatic treatment for traveler's diarrhea: azithromycin  Altitude illness prevention and treatment: Diamox prescription given with instructions on use and education provided on Acute altitude illness recognition and prevention.        Evacuation insurance  advised and resources were provided to patient.    Total visit time 45 minutes  with over 50% of time spent counseling patient and shared decision making as detailed above.    Priscila Torres CNP  Certificate in Travel Health

## 2023-11-15 NOTE — PATIENT INSTRUCTIONS
Thank you for visiting the [facility] : [phone]  Today November 15, 2023 you received the    Typhoid - injectable. This vaccine is valid for two years.       HEP A    Follow up vaccine appointments can be made as a NURSE ONLY visit at the Travel Clinic, (BE PREPARED TO WAIT, ) or at designated [facility].    If you are receiving the Rabies vaccines series, it is important that you follow the exact schedule ordered.     Pre-travel     We recommend that you purchase Medical Evacuation Insurance prior to your departure.  Https://wwwnc.cdc.gov/travel/page/insurance    [place] your travel plans with the [place] Department of State through STEP ( Smart Traveler Enrollment Program ) https://step.state.gov.  STEP is a free service to allow U[place] citizens and nationals traveling and living abroad to enroll their trip with the nearest [place] Embassy or Consulate.    Animal Exposure: Avoid all mammals even if they look healthy.  If there is a bite, scratch or even a lick, wash area immediately with soap and water for 15 minutes and seek medical care within 24 hours for evaluation of Rabies post exposure treatment.  Contact your Medical Evacuation Insurance.    Repiratory illness prevention strategies ( Covid and Influenza ) CDC recommendations:  Consider wearing a mask in crowded or poorly ventilated indoor areas, including on public transportation and in transportation hubs.  Hand washing: frequent, thorough handwashing with soap and water for 20 seconds. Use an alcohol-based hand  with at least 60% alcohol if soap and water are not readily available. Avoid touching face, nose, eyes, mouth unless you have done appropriate hand washing as above.  VACCINES: Staying up to date on COVID-19 vaccines significantly lowers the risk of getting very sick, being hospitalized, or dying from COVID-19. CDC recommends that everyone stay up to date on their COVID-19 vaccines,  especially people with weakened immune systems.    Travel Covid 19 Testing:  updated 12/06/2021  International travelers: Pre-travel:  See country specific Embassy websites or airline websites.    Post travel: CDC recommends getting tested 3-5 days after your trip     Post-travel illness:  Contact your provider or [facility] if you develop a fever, rash, cough, diarrhea or other symptoms for up to 1 year after travel.  Inform your healthcare provider when and where you traveled to.    Please call the [facility] with any questions [phone]  Or send your provider a 'My Chart' note.

## 2023-11-15 NOTE — NURSING NOTE
Dermatology Rooming Note    Lyndsey Hagen's goals for this visit include:   Chief Complaint   Patient presents with    MicroNeedling      Lyndsey is here today for a 2nd microneedling      SELENE Carter

## 2023-12-13 ENCOUNTER — OFFICE VISIT (OUTPATIENT)
Dept: DERMATOLOGY | Facility: CLINIC | Age: 73
End: 2023-12-13
Payer: COMMERCIAL

## 2023-12-13 DIAGNOSIS — L98.8 RHYTIDES: Primary | ICD-10-CM

## 2023-12-13 PROCEDURE — 96999 UNLISTED SPEC DERM SVC/PX: CPT | Performed by: DERMATOLOGY

## 2023-12-13 NOTE — PROGRESS NOTES
I saw the patient post procedure. The end point was excellent. The patient was  happy.     Mary Kerr MD    Department of Dermatology  [facility]: Phone: [phone], Fax:[phone]  [facility]: Phone: [phone], Fax: [phone]

## 2023-12-13 NOTE — PATIENT INSTRUCTIONS
What to Expect After Microneedling Treatment:    Microneedling Treatment produces side effects. I will experience redness, swelling, pain, and vibrating sensation. I may experience bleeding, oozing, itching, or acne. Risks are blistering, permanent scarring, temporary or permanent skin lightening or darkening, infection, and eye injury. I understand my outcome could be no improvement or slight improvement. Multiple treatments may be required.    Notify your physician if the severity of your side effects becomes a problem for you.     What you may feel and look like:    Immediately after the treatment, you will experience redness, swelling and sometimes pinpoint bleeding. You will notice most of the swelling on the first morning after treatment, particularly under the eyes. Swelling usually lasts two to three days. To minimize swelling do the following:  Apply cold compresses to the treatment area for 10 minutes of every hour on the day of treatment, until you go to bed.  Sleep elevated the first night. Use as many pillows as you can tolerate.      Over the next few days, you will have pinkness that fades but can last a few weeks. Most redness resolves during the first week after treatment, but a pearl  glow  can remain for several weeks. If you wish, you can apply makeup to minimize the redness after 24 hours      How to Care for Your Skin After Treatment    Congratulations! You have taken the first step toward more healthy and radiant looking skin by having a microneedling treatment. Now it is important to help your skin heal quickly and protect your skin investment.     Your after-treatment skin care regimen is tailored to the treatment you received today. Follow the instructions as checked below:    Immediately After Treatment:    Use your Lift hyaluronic gel- this will help soothe the skin.   Apply your Skin pen premium biocellulose masque- this can stay on for 20 minutes- this will also help calm/soothe the skin  after treatment.    Try to get as close to 24 hours as possible, then start Revision skin care regimen:    Start with cleansing the skin with the Revision Gentle cleansing lotion, you can do this twice daily.    Second, apply the Revision Hydrating Serum, you can do this twice daily. You can continue apply the Lift gel as well.    Lastly apply the Revision D.E.J Face cream, you can do this twice daily.    After 72 hours, continue with gentle skin care regimen for the next two weeks.    First Week of Healing. Keep treated area clean; avoid smoking, excessive alcohol consumption, excessive exercise, perspiring, swimming, or exposing skin to heat and sun.    Scrubs, Toners, Glycolic Acid, and Retin A. Your skin will be sensitive for the first week or so after treatment. Do not use products that will cause irritation during this time. Do not use abrasive scrubs, toners, or products that contain glycolic acids or Retin A or tretinoin. Read the product labels.       Sun protection: Wear a hat and sunscreen (as recommended above) when you are in the sun. This will help you avoid microneedle-induced hyperpigmentation (darker color). It will decrease your risk of problems.     You may start make up after 76 hours and after 2 weeks you may restart your old products. Lip stick and mascara (along with eye shadow) are okay on untreated areas in 24 hours.     Cold Sores. If you have a history of cold sores, ask your doctor about care!    Abnormal Healing. If you notice any blisters, cuts, bruises, crusting/scabs, areas of raw skin, ulcerations, active bleeding, increased discomfort or pain, pigment changes (lighter or darker than usual complexion), or any other problems, please contact us as soon as possible.    Who should I call with questions?  [facility]: [phone]    [facility]: [phone]    For urgent needs outside of business hours call the [facility] at [phone] and ask for the dermatology resident on call

## 2023-12-13 NOTE — LETTER
12/13/2023       RE: Lyndsey Hagen  [address]  [place] [address]     Dear Colleague,    Thank you for referring your patient, Lyndsey Hagen, to the [facility]ERMAT[facility] [place] at [facility]. Please see a copy of my visit note below.    I saw the patient post procedure. The end point was excellent. The patient was  happy.     Mary eKrr MD    Department of Dermatology  [facility]: Phone: [phone], Fax:[phone]  [facility]: Phone: [phone], Fax: [phone]      Dermatology Microneedeling Intake Checklist:  History of psoriasis:No,   History of recent tan, indoor or outdoor tanning/vacation or other sun exposure:No,   History of vitiligo:No,   Recent other cosmetic procedure(microderm abrasion/peel/hair removal/facial etc):No,   History of HSV:No  Tattoo in the area to be treated:No,   Is patient using hydroquinone:No,   Retinoids and other acne medications stopped for 2 weeks:Not applicable,   Has the patient had accutane in the last 6-12 months:No,   Pregnant or breastfeeding: Not applicable,   History of skin cancer in area planned for treatment: No,    History of treatment with gold:No,   Changes in medical history: No,   Photos obtained: YES,   Does the patient smoke:No,   If patient is taking narcotic or diazepam(valium)-does patient have :Not applicable,   LMP  (LMP Unknown)      Micorneedling Procedure: Cosmetic    Procedure Date: 12/13/2023  Staff:Pooja Jordan RN  Resident/Fellow:      Procedure:   Microneedling treatment #  3    Approximate length of treatment: 60 minutes    Anesthesia and Premedication:  Anesthesia (topical): Benzocaine 20%/Lidocaine 8%/Tetracaine 4% ointment  Oral prophylaxis: valacyclovir 500mg twice daily for 7 days, starting 2 days prior to the  procedure    Device Settings:  Diagnosis:     Location: forehead  Microneedling depth: 0.5mm  Number of Passes: 3    Location: periorbital  Microneedling depth: 0.25mm  Number of Passes: 3    Location: cheeks  Microneedling depth: 1.25mm  Number of Passes: 3    Location: perioral  Microneedling depth: 1mm  Number of Passes: 3    Location: chin  Microneedling depth: 1mm  Number of Passes: 3    See microneedling map in media tab    Fotofinder photos: No    Description of Procedure:   The nature and purpose of the procedure, associated risks, possible consequences, complications, and alternative methods of treatment were explained in detail including but not limited to redness, swelling, pinpoint bleeding of the skin, eye injury, infection, oozing, heat sensation, itching or acne.  Possible outcomes were reviewed including the following: no improvement, slight improvement, skin lightening or darkening. The possibility of permanent scarring was reviewed. Discussion that multiple treatments may be required was completed.     Photo consent was obtained and reviewed, a time out was performed, and informed consent was obtained.  Protective eyewear was worn by all personnel in the treatment room. The settings were verified by meter reading. The skin was cleansed with gentle soap and water. Protective eyewear was worn by the patient and the area was prepped with chlorhexidine. Hyaluronic acid gliding gel (eclipse) was applied to the treatment area. The areas were treated with Eclipse microneedle laser as described above. After treatment, additional hyaluronic acid gel was applied.       Verbal and written aftercare instructions were provided. Post procedure care including use of mild, gentle cleansers and moisturizers  for the 2 weeks following treatment was reviewed. The patient was recommend application of at least SPF 50 sunscreen daily with a physical blocker after 24 hours  and avoidance of direct sunlight up to 3 months  post procedure. The patient was reminded to complete valtrex. The patient was discharged from the dermatology clinic in good condition.    The patient will follow up with nursing if needed. Next appt already scheduled.    This was #3 of pkg of 3 paid previoulsy.    Staff Involved:  Staff Only

## 2023-12-13 NOTE — PROGRESS NOTES
Dermatology Microneedeling Intake Checklist:  History of psoriasis:No,   History of recent tan, indoor or outdoor tanning/vacation or other sun exposure:No,   History of vitiligo:No,   Recent other cosmetic procedure(microderm abrasion/peel/hair removal/facial etc):No,   History of HSV:No  Tattoo in the area to be treated:No,   Is patient using hydroquinone:No,   Retinoids and other acne medications stopped for 2 weeks:Not applicable,   Has the patient had accutane in the last 6-12 months:No,   Pregnant or breastfeeding: Not applicable,   History of skin cancer in area planned for treatment: No,    History of treatment with gold:No,   Changes in medical history: No,   Photos obtained: YES,   Does the patient smoke:No,   If patient is taking narcotic or diazepam(valium)-does patient have :Not applicable,   LMP  (LMP Unknown)      Micorneedling Procedure: Cosmetic    Procedure Date: 12/13/2023  Staff:Pooja Jordan RN  Resident/Fellow:      Procedure:   Microneedling treatment #  3    Approximate length of treatment: 60 minutes    Anesthesia and Premedication:  Anesthesia (topical): Benzocaine 20%/Lidocaine 8%/Tetracaine 4% ointment  Oral prophylaxis: valacyclovir 500mg twice daily for 7 days, starting 2 days prior to the procedure    Device Settings:  Diagnosis:     Location: forehead  Microneedling depth: 0.5mm  Number of Passes: 3    Location: periorbital  Microneedling depth: 0.25mm  Number of Passes: 3    Location: cheeks  Microneedling depth: 1.25mm  Number of Passes: 3    Location: perioral  Microneedling depth: 1mm  Number of Passes: 3    Location: chin  Microneedling depth: 1mm  Number of Passes: 3    See microneedling map in media tab    Fotofinder photos: No    Description of Procedure:   The nature and purpose of the procedure, associated risks, possible consequences, complications, and alternative methods of treatment were explained in detail including but not limited to redness, swelling, pinpoint  bleeding of the skin, eye injury, infection, oozing, heat sensation, itching or acne.  Possible outcomes were reviewed including the following: no improvement, slight improvement, skin lightening or darkening. The possibility of permanent scarring was reviewed. Discussion that multiple treatments may be required was completed.     Photo consent was obtained and reviewed, a time out was performed, and informed consent was obtained.  Protective eyewear was worn by all personnel in the treatment room. The settings were verified by meter reading. The skin was cleansed with gentle soap and water. Protective eyewear was worn by the patient and the area was prepped with chlorhexidine. Hyaluronic acid gliding gel (eclipse) was applied to the treatment area. The areas were treated with Eclipse microneedle laser as described above. After treatment, additional hyaluronic acid gel was applied.       Verbal and written aftercare instructions were provided. Post procedure care including use of mild, gentle cleansers and moisturizers  for the 2 weeks following treatment was reviewed. The patient was recommend application of at least SPF 50 sunscreen daily with a physical blocker after 24 hours  and avoidance of direct sunlight up to 3 months post procedure. The patient was reminded to complete valtrex. The patient was discharged from the dermatology clinic in good condition.    The patient will follow up with nursing if needed. Next appt already scheduled.    This was #3 of pkg of 3 paid previoulsy.    Staff Involved:  Staff Only

## 2024-03-05 ENCOUNTER — OFFICE VISIT (OUTPATIENT)
Dept: DERMATOLOGY | Facility: CLINIC | Age: 74
End: 2024-03-05
Payer: COMMERCIAL

## 2024-03-05 DIAGNOSIS — L82.1 SK (SEBORRHEIC KERATOSIS): ICD-10-CM

## 2024-03-05 DIAGNOSIS — Z85.828 HISTORY OF SKIN CANCER: ICD-10-CM

## 2024-03-05 DIAGNOSIS — C84.A0 CUTANEOUS T-CELL LYMPHOMA, UNSPECIFIED BODY REGION (H): Primary | ICD-10-CM

## 2024-03-05 PROCEDURE — 99214 OFFICE O/P EST MOD 30 MIN: CPT | Mod: GC | Performed by: DERMATOLOGY

## 2024-03-05 ASSESSMENT — PAIN SCALES - GENERAL: PAINLEVEL: NO PAIN (0)

## 2024-03-05 NOTE — PROGRESS NOTES
[facility] Dermatology Note    Encounter Date: Mar 5, 2024    Dermatology Problem List:  1. CTCL, stage IIB  - Previously treated with PUVA with good response. Treated at [place] prior to establishing care here. Stopped due to difficulty percuring oxsoralen and risks associated with PUVA.  Restarted on UVA1 with clinical remission.  Discontinued UVA1 3/12/2018  -Temporarily discontinued UVA1 (4/26/17) due to insurance  -Targretin 1% gel (added on 5/23/16) with good response.   -Triamcinolone 0.1% cream as needed for irritation from targretin gel.  2. Lesion to monitor, left superior parietal scalp Photo 8/27/2018  3. 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 Androgenic alopecia- LLLT 3x/week  5. ISK. LiqN2.   6. Solar lentiginosis      - hydroquinone 4% cream, Hydrating B5 cream, glycolic acid wash  7. botox years ago  8. LTM 1mm darker macule right forearm Photo 3/5/24  ______________________________________    Impression/Plan:  Lyndsey was seen today for skin check.    Diagnoses and all orders for this visit:    Skin Check  Cutaneous T-cell lymphoma, unspecified body region (H)  History of skin cancer  73-year-old female with complicated dermatologic history presenting today for skin recheck. Only concern regarding 2 new lesions on back with are both SK. Reassurance provided. No other concerns and exam without concerning findings other than below.     Lesion to Monitor  1 mm lesion to Right forearm that is a darker macule. Will have patient in for recheck in 3-4 months to review. Photo today    Follow-up in 3-4 months.       Staff Involved:  Staff Only    Christopher Bermudez DO PGY3  Family Medicine Resident    Discussed with Dr. Guillory.   I, Arlen Guillory MD, saw this patient with the resident and agree with the resident s findings and plan of care as documented in the resident s note.   CC:   Chief Complaint   Patient presents with    Skin Check     Lyndsey is here  for a skin check and has a spot of concern on her back.       History of Present Illness:  Ms. Lyndsey Hagen is a 73 year old female who presents as a return patient for skin check.     Overall reports she is doing well without concerns today.  Reports she does have 2 spots on her back that have been slow growing over the last 4 to 5 months.  One of them is mildly pruritic but no bleeding and no pain.  Denies any B symptoms or weight loss.    Labs:  None    Physical exam:  Vitals: LMP  (LMP Unknown)   GEN: well developed, well-nourished, in no acute distress, in a pleasant mood.     SKIN:   - Total skin excluding the undergarment areas was performed. The exam included the head/face, neck, both arms, chest, back, abdomen, both legs, digits and/or nails.   -1 mm darker colored macule (see picture 1 below)  -SK to area of patient concern (see picture 2 below)  - nevi with no atypia on dermoscopy  - scars with NERD  - lentigo  - waxy stuck on papules  - no cervical, submandibular, axillary or inguinal adenopathy   - No other lesions of concern on areas examined.             Past Medical History:   Past Medical History:   Diagnosis Date    Cancer, skin, squamous cell     Cutaneous T-cell lymphoma (H)     Osteopenia      Past Surgical History:   Procedure Laterality Date    NO HISTORY OF SURGERY  10/28/31    derm       Social History:   reports that she quit smoking about 13 years ago. Her smoking use included cigarettes. She started smoking about 56 years ago. She has a 28 pack-year smoking history. She has never used smokeless tobacco.    Family History:  Family History   Problem Relation Age of Onset    Cancer Mother         skin cancer    Skin Cancer Mother     Other - See Comments Mother         pneumothorax,  age 86    Pneumonia Father          age 60    Spine Problems Father     Suicidality Sister          age 26    Skin Cancer Sister     Hypertension Sister     Other - See Comments Sister         spinal  stenosis    Pancreatic Cancer Sister     Aneurysm Brother         AAA ruptured,  age 69    Spine Problems Brother     Melanoma No family hx of     Breast Cancer No family hx of        Medications:  Current Outpatient Medications   Medication Sig Dispense Refill    atorvastatin (LIPITOR) 10 MG tablet Take 1 tablet (10 mg) by mouth daily 90 tablet 3    Bexarotene (TARGRETIN) 1 % GEL APPLY EVERY OTHER NIGHT TO EVERY NIGHT AS DIRECTED 60 g 5    Cholecalciferol (VITAMIN D-3 PO) Take 1 tablet by mouth daily      Coenzyme Q10 (CO Q-10 PO) Take 1 tablet by mouth daily.      Multiple Vitamin (MULTIVITAMINS PO) Take  by mouth daily.      Probiotic Product (SOLUBLE FIBER/PROBIOTICS PO) Take 2 tablets by mouth daily.      triamcinolone (KENALOG) 0.1 % external cream Use in morning to rash 454 g 5    acetaZOLAMIDE (DIAMOX) 125 MG tablet Take one tab every 12 hours starting 24 hours prior to PARO  and continue for 24 hours while at the highest altitude (Patient not taking: Reported on 3/5/2024) 10 tablet 0    atovaquone-proguanil (MALARONE) 250-100 MG tablet Take 1 tablet by mouth daily Start 2 days before exposure to Malaria and continue daily till  7 days after exposure. (Patient not taking: Reported on 3/5/2024) 25 tablet 0    betamethasone dipropionate (DIPROSONE) 0.05 % external lotion Apply topically 2 times daily (Patient not taking: Reported on 3/5/2024) 60 mL 11    hydroquinone (DEEPALI) 4 % external cream Apply twice daily (Patient not taking: Reported on 3/5/2024) 45 g 0    hydrOXYzine (ATARAX) 25 MG tablet Take 1 tablet (25 mg) by mouth 3 times daily as needed for anxiety (Patient not taking: Reported on 3/5/2024) 20 tablet 0    valACYclovir (VALTREX) 500 MG tablet Take 1 tablet (500 mg) by mouth 2 times daily for 7 days 14 tablet 0    valACYclovir (VALTREX) 500 MG tablet Take 1 tablet (500 mg) by mouth 2 times daily for 7 days 14 tablet 0     Allergies   Allergen Reactions    Nkda [No Known Drug Allergy]

## 2024-03-05 NOTE — LETTER
3/5/2024       RE: Lyndsey Hagen  [address]  [place] [address]     Dear Colleague,    Thank you for referring your patient, Lyndsey Hagen, to the [facility] DERMATOLOGY CLINIC [place] at [facility]. Please see a copy of my visit note below.    [facility] Dermatology Note    Encounter Date: Mar 5, 2024    Dermatology Problem List:  1. CTCL, stage IIB  - Previously treated with PUVA with good response. Treated at [place] prior to establishing care here. Stopped due to difficulty percuring oxsoralen and risks associated with PUVA.  Restarted on UVA1 with clinical remission.  Discontinued UVA1 3/12/2018  -Temporarily discontinued UVA1 (4/26/17) due to insurance  -Targretin 1% gel (added on 5/23/16) with good response.   -Triamcinolone 0.1% cream as needed for irritation from targretin gel.  2. Lesion to monitor, left superior parietal scalp Photo 8/27/2018  3. 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 Androgenic alopecia- LLLT 3x/week  5. ISK. LiqN2.   6. Solar lentiginosis      - hydroquinone 4% cream, Hydrating B5 cream, glycolic acid wash  7. botox years ago  8. LTM 1mm darker macule right forearm Photo 3/5/24  ______________________________________    Impression/Plan:  Lyndsey was seen today for skin check.    Diagnoses and all orders for this visit:    Skin Check  Cutaneous T-cell lymphoma, unspecified body region (H)  History of skin cancer  73-year-old female with complicated dermatologic history presenting today for skin recheck. Only concern regarding 2 new lesions on back with are both SK. Reassurance provided. No other concerns and exam without concerning findings other than below.     Lesion to Monitor  1 mm lesion to Right forearm that is a darker macule. Will have patient in for recheck in 3-4 months to review. Photo today    Follow-up in 3-4 months.       Staff Involved:  Staff  Only    Christopher Bermudez DO PGY3  Family Medicine Resident    Discussed with Dr. Guillory.   I, Arlen Guillory MD, saw this patient with the resident and agree with the resident s findings and plan of care as documented in the resident s note.   CC:   Chief Complaint   Patient presents with    Skin Check     Lyndsey is here for a skin check and has a spot of concern on her back.       History of Present Illness:  Ms. Lyndsey Hagen is a 73 year old female who presents as a return patient for skin check.     Overall reports she is doing well without concerns today.  Reports she does have 2 spots on her back that have been slow growing over the last 4 to 5 months.  One of them is mildly pruritic but no bleeding and no pain.  Denies any B symptoms or weight loss.    Labs:  None    Physical exam:  Vitals: LMP  (LMP Unknown)   GEN: well developed, well-nourished, in no acute distress, in a pleasant mood.     SKIN:   - Total skin excluding the undergarment areas was performed. The exam included the head/face, neck, both arms, chest, back, abdomen, both legs, digits and/or nails.   -1 mm darker colored macule (see picture 1 below)  -SK to area of patient concern (see picture 2 below)  - nevi with no atypia on dermoscopy  - scars with NERD  - lentigo  - waxy stuck on papules  - no cervical, submandibular, axillary or inguinal adenopathy   - No other lesions of concern on areas examined.             Past Medical History:   Past Medical History:   Diagnosis Date    Cancer, skin, squamous cell     Cutaneous T-cell lymphoma (H)     Osteopenia      Past Surgical History:   Procedure Laterality Date    NO HISTORY OF SURGERY  10/28/31    derm       Social History:   reports that she quit smoking about 13 years ago. Her smoking use included cigarettes. She started smoking about 56 years ago. She has a 28 pack-year smoking history. She has never used smokeless tobacco.    Family History:  Family History   Problem Relation Age of Onset     Cancer Mother         skin cancer    Skin Cancer Mother     Other - See Comments Mother         pneumothorax,  age 86    Pneumonia Father          age 60    Spine Problems Father     Suicidality Sister          age 26    Skin Cancer Sister     Hypertension Sister     Other - See Comments Sister         spinal stenosis    Pancreatic Cancer Sister     Aneurysm Brother         AAA ruptured,  age 69    Spine Problems Brother     Melanoma No family hx of     Breast Cancer No family hx of        Medications:  Current Outpatient Medications   Medication Sig Dispense Refill    atorvastatin (LIPITOR) 10 MG tablet Take 1 tablet (10 mg) by mouth daily 90 tablet 3    Bexarotene (TARGRETIN) 1 % GEL APPLY EVERY OTHER NIGHT TO EVERY NIGHT AS DIRECTED 60 g 5    Cholecalciferol (VITAMIN D-3 PO) Take 1 tablet by mouth daily      Coenzyme Q10 (CO Q-10 PO) Take 1 tablet by mouth daily.      Multiple Vitamin (MULTIVITAMINS PO) Take  by mouth daily.      Probiotic Product (SOLUBLE FIBER/PROBIOTICS PO) Take 2 tablets by mouth daily.      triamcinolone (KENALOG) 0.1 % external cream Use in morning to rash 454 g 5    acetaZOLAMIDE (DIAMOX) 125 MG tablet Take one tab every 12 hours starting 24 hours prior to PARO  and continue for 24 hours while at the highest altitude (Patient not taking: Reported on 3/5/2024) 10 tablet 0    atovaquone-proguanil (MALARONE) 250-100 MG tablet Take 1 tablet by mouth daily Start 2 days before exposure to Malaria and continue daily till  7 days after exposure. (Patient not taking: Reported on 3/5/2024) 25 tablet 0    betamethasone dipropionate (DIPROSONE) 0.05 % external lotion Apply topically 2 times daily (Patient not taking: Reported on 3/5/2024) 60 mL 11    hydroquinone (DEEPALI) 4 % external cream Apply twice daily (Patient not taking: Reported on 3/5/2024) 45 g 0    hydrOXYzine (ATARAX) 25 MG tablet Take 1 tablet (25 mg) by mouth 3 times daily as needed for anxiety (Patient not taking:  Reported on 3/5/2024) 20 tablet 0    valACYclovir (VALTREX) 500 MG tablet Take 1 tablet (500 mg) by mouth 2 times daily for 7 days 14 tablet 0    valACYclovir (VALTREX) 500 MG tablet Take 1 tablet (500 mg) by mouth 2 times daily for 7 days 14 tablet 0     Allergies   Allergen Reactions    Nkda [No Known Drug Allergy]

## 2024-03-05 NOTE — NURSING NOTE
Dermatology Rooming Note    Lyndsey Hagen's goals for this visit include:   Chief Complaint   Patient presents with    Skin Check     Lyndsey is here for a skin check and has a spot of concern on her back.     Dagoberto Burns, EMT

## 2024-07-16 ENCOUNTER — OFFICE VISIT (OUTPATIENT)
Dept: DERMATOLOGY | Facility: CLINIC | Age: 74
End: 2024-07-16
Payer: COMMERCIAL

## 2024-07-16 ENCOUNTER — ANCILLARY PROCEDURE (OUTPATIENT)
Dept: MAMMOGRAPHY | Facility: CLINIC | Age: 74
End: 2024-07-16
Attending: INTERNAL MEDICINE
Payer: COMMERCIAL

## 2024-07-16 DIAGNOSIS — C84.A0 CUTANEOUS T-CELL LYMPHOMA, UNSPECIFIED BODY REGION (H): Primary | ICD-10-CM

## 2024-07-16 DIAGNOSIS — L82.1 SEBORRHEIC KERATOSES: ICD-10-CM

## 2024-07-16 DIAGNOSIS — L81.4 LENTIGINES: ICD-10-CM

## 2024-07-16 DIAGNOSIS — Z12.31 VISIT FOR SCREENING MAMMOGRAM: ICD-10-CM

## 2024-07-16 DIAGNOSIS — Z85.828 HISTORY OF SKIN CANCER: ICD-10-CM

## 2024-07-16 DIAGNOSIS — D18.01 CHERRY ANGIOMA: ICD-10-CM

## 2024-07-16 PROCEDURE — 77063 BREAST TOMOSYNTHESIS BI: CPT | Mod: GC

## 2024-07-16 PROCEDURE — 77067 SCR MAMMO BI INCL CAD: CPT | Mod: GC

## 2024-07-16 PROCEDURE — 99214 OFFICE O/P EST MOD 30 MIN: CPT | Performed by: DERMATOLOGY

## 2024-07-16 ASSESSMENT — PAIN SCALES - GENERAL: PAINLEVEL: NO PAIN (0)

## 2024-07-16 NOTE — PROGRESS NOTES
[facility] Dermatology Note  Encounter Date: Jul 16, 2024  Office Visit     Dermatology Problem List:  1. CTCL, stage IIB  - Previously treated with PUVA with good response. Treated at [place] prior to establishing care here. Stopped due to difficulty percuring oxsoralen and risks associated with PUVA.  Restarted on UVA1 with clinical remission.  Discontinued UVA1 3/12/2018  -Temporarily discontinued UVA1 (4/26/17) due to insurance  -Targretin 1% gel (added on 5/23/16) with good response.   -Triamcinolone 0.1% cream as needed for irritation from targretin gel.  2. Lesion to monitor, left superior parietal scalp Photo 8/27/2018  3. 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 Androgenic alopecia- LLLT 3x/week  5. ISK. LiqN2.   6. Solar lentiginosis      - hydroquinone 4% cream, Hydrating B5 cream, glycolic acid wash  7. botox years ago  8. LTM 1mm darker macule right forearm Photo 3/5/24    ____________________________________________    Assessment & Plan:    # CTCL - chronic, improved on exam today.   - Continue targretin gel and triamcinolone as needed for flares.      # History of non melanoma skin cancer.  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  - continue regular skin checks every 6 months.    # Seborrheic keratosis, non irritated.   # Solar lentigines.   # Cherry angiomas.   - NTD: No further management at this time.    # Blue nevus.  Measuring 1 mm today. Stable from previous.   - Monitor: Patient to continue monitoring at home and will contact the clinic for any changes.  - Plan to  recheck at future FBSEs.        Procedures Performed:   None    Follow-up: 6 month(s) in-person, or earlier for new or changing lesions    Staff and Medical Student:     Harmony Maradiaga, MS4    I was present with the medical student who participated in the service and in the documentation of the note. I have verified the history and personally performed the physical exam and medical decision making. I agree with the assessment and plan of care as documented in the note.  Arlen Guillory MD   ____________________________________________    CC: Derm Problem (FBSE- recheck spot on arm)    HPI:  Ms. Lyndsey Hagen is a(n) 73 year old female who presents today as a return patient for FBSE.    Last visit 3/5/24. History of NMSC x2 s/p MMS. Today she reports overall feeling well. No specific lesions of concern today. Lesion on R forearm has not grown.    Regarding her CTCL, she feels her disease has been well controlled recently. She has not needed to use either topical therapy in months.     Patient is otherwise feeling well, without additional skin concerns.    Labs:  None reviewed.    Physical Exam:  Vitals: LMP  (LMP Unknown)   SKIN: Full skin, which includes the head/face, both arms, chest, back, abdomen,both legs, genitalia and/or groin buttocks, digits and/or nails, was examined.  - On the right forearm there is an ill-defined blue-brown ovoid macule measuring 1 mm. Stable appearance compared to previous photograph dated 3/5/24.  - On the bilateral upper extremities and trunk there are tan, waxy, stuck on papules  - Multiple erythematous domed papules on the trunk  - Multiple regular appearing nevi on the back, upper and lower extremities.   - No palpable adenopathy  - No other lesions of concern on areas examined.     Medications:  Current Outpatient Medications   Medication Sig Dispense Refill    atorvastatin (LIPITOR) 10 MG tablet Take 1 tablet (10 mg) by mouth daily 90 tablet 3    Bexarotene (TARGRETIN) 1 %  GEL APPLY EVERY OTHER NIGHT TO EVERY NIGHT AS DIRECTED 60 g 5    Cholecalciferol (VITAMIN D-3 PO) Take 1 tablet by mouth daily      FLUoxetine (PROZAC) 20 MG capsule TAKE 1 CAP ONCE DAILY AT BEDTIME      Multiple Vitamin (MULTIVITAMINS PO) Take  by mouth daily.      Probiotic Product (SOLUBLE FIBER/PROBIOTICS PO) Take 2 tablets by mouth daily.      triamcinolone (KENALOG) 0.1 % external cream Use in morning to rash 454 g 5    acetaZOLAMIDE (DIAMOX) 125 MG tablet Take one tab every 12 hours starting 24 hours prior to PARO  and continue for 24 hours while at the highest altitude (Patient not taking: Reported on 3/5/2024) 10 tablet 0    atovaquone-proguanil (MALARONE) 250-100 MG tablet Take 1 tablet by mouth daily Start 2 days before exposure to Malaria and continue daily till  7 days after exposure. (Patient not taking: Reported on 3/5/2024) 25 tablet 0    betamethasone dipropionate (DIPROSONE) 0.05 % external lotion Apply topically 2 times daily (Patient not taking: Reported on 3/5/2024) 60 mL 11    Coenzyme Q10 (CO Q-10 PO) Take 1 tablet by mouth daily.      hydroquinone (DEEPALI) 4 % external cream Apply twice daily (Patient not taking: Reported on 3/5/2024) 45 g 0    hydrOXYzine (ATARAX) 25 MG tablet Take 1 tablet (25 mg) by mouth 3 times daily as needed for anxiety (Patient not taking: Reported on 3/5/2024) 20 tablet 0    valACYclovir (VALTREX) 500 MG tablet Take 1 tablet (500 mg) by mouth 2 times daily for 7 days 14 tablet 0    valACYclovir (VALTREX) 500 MG tablet Take 1 tablet (500 mg) by mouth 2 times daily for 7 days 14 tablet 0     Current Facility-Administered Medications   Medication Dose Route Frequency Provider Last Rate Last Admin    lidocaine 1% with EPINEPHrine 1:100,000 injection 3 mL  3 mL Intradermal Once Arlen Guillory MD          Past Medical History:   Patient Active Problem List   Diagnosis    Cutaneous T-cell lymphoma (H)     Past Medical History:   Diagnosis Date    Cancer, skin,  squamous cell     Cutaneous T-cell lymphoma (H)     Osteopenia         CC Referred Self, MD  No address on file on close of this encounter.

## 2024-07-16 NOTE — NURSING NOTE
Dermatology Rooming Note    Lyndsey Hagen's goals for this visit include:   Chief Complaint   Patient presents with    Derm Problem     FBSE- recheck spot on arm     SAAD Lui

## 2024-07-25 ENCOUNTER — OFFICE VISIT (OUTPATIENT)
Dept: FAMILY MEDICINE | Facility: CLINIC | Age: 74
End: 2024-07-25
Payer: COMMERCIAL

## 2024-07-25 DIAGNOSIS — Z71.84 ENCOUNTER FOR COUNSELING FOR TRAVEL: Primary | ICD-10-CM

## 2024-07-25 DIAGNOSIS — Z23 NEED FOR PROPHYLACTIC VACCINATION AGAINST HEPATITIS A: ICD-10-CM

## 2024-07-25 DIAGNOSIS — Z29.89 ALTITUDE SICKNESS PREVENTATIVE MEASURES: ICD-10-CM

## 2024-07-25 PROCEDURE — 99402 PREV MED CNSL INDIV APPRX 30: CPT | Mod: GA | Performed by: PHYSICIAN ASSISTANT

## 2024-07-25 RX ORDER — AZITHROMYCIN 500 MG/1
TABLET, FILM COATED ORAL
Qty: 3 TABLET | Refills: 0 | Status: SHIPPED | OUTPATIENT
Start: 2024-07-25

## 2024-07-25 RX ORDER — ACETAZOLAMIDE 125 MG/1
125 TABLET ORAL 2 TIMES DAILY
Qty: 8 TABLET | Refills: 0 | Status: SHIPPED | OUTPATIENT
Start: 2024-07-25

## 2024-07-25 NOTE — PATIENT INSTRUCTIONS
"See travel packet provided  Recommend ultrathon (mosquito repellant), pepto bismol and imodium  The food and drink choices you make while traveling can impact your likelihood of getting sick.   If you aren't sure if a food or drink is safe, the saying \" BOIL IT, COOK IT, PEEL IT, OR FORGET IT\" can help you decide whether it's okay to consume.   Also bring hand  and sun screen with you.  Safe Travels     If you first start to get mild to moderate diarrhea, take imodium.      If diarrhea is severe or you have a fever with the diarrhea, take the antibiotic (azithromycin).      Get Hepatitis A vaccine at pharmacy. This will be your final dose.  "

## 2024-07-25 NOTE — NURSING NOTE
Prior to immunization administration, verified patients identity using patient s name and date of birth. Please see Immunization Activity for additional information.     Screening Questionnaire for Adult Immunization    Are you sick today?   No   Do you have allergies to medications, food, a vaccine component or latex?   No   Have you ever had a serious reaction after receiving a vaccination?   No   Do you have a long-term health problem with heart, lung, kidney, or metabolic disease (e.g., diabetes), asthma, a blood disorder, no spleen, complement component deficiency, a cochlear implant, or a spinal fluid leak?  Are you on long-term aspirin therapy?   Yes   Do you have cancer, leukemia, HIV/AIDS, or any other immune system problem?   No   Do you have a parent, brother, or sister with an immune system problem?   No   In the past 3 months, have you taken medications that affect  your immune system, such as prednisone, other steroids, or anticancer drugs; drugs for the treatment of rheumatoid arthritis, Crohn s disease, or psoriasis; or have you had radiation treatments?   No   Have you had a seizure, or a brain or other nervous system problem?   No   During the past year, have you received a transfusion of blood or blood    products, or been given immune (gamma) globulin or antiviral drug?   No   For women: Are you pregnant or is there a chance you could become       pregnant during the next month?   No   Have you received any vaccinations in the past 4 weeks?   No     Immunization questionnaire answers were all negative.      Patient instructed to remain in clinic for 15 minutes afterwards, and to report any adverse reactions.     Screening performed by Kerry Denis MA on 7/25/2024 at 9:49 AM.         sensory intact

## 2024-07-25 NOTE — PROGRESS NOTES
SUBJECTIVE: Lyndsey Hagen , a 73 year old  female, presents for counseling and information regarding upcoming travel to [place]. Special medical concerns include: none. She anticipates the following unusual exposures: none.    Itinerary:  [place], [place], [place]    Departure Date: 9/25/24 Return date: 10/12/24    Reason for travel (i.e. Business, pleasure): pleasure    Visiting an urban or rural area?: both    Accommodations (i.e. hotel, hostel, friends, family, etc): hotel      IMMUNIZATION HISTORY  Have you received any vaccinations in the past 4 weeks? If so, which? No  Have you ever fainted from having your blood drawn or from an injection?  No  Have you ever had any bad reaction or side effect from any vaccination?  If so, which? No  Do you live (or work closely) with anyone who has AIDS, an AIDS-like condition, any other immune disorder or who is on chemotherapy for cancer?  No  Have you received any injection of immune globulin or any blood products during the past 12 months?  No    GENERAL MEDICAL HISTORY  Do you have a medical condition that requires medicine or doctor follow-up visits?  yes  Do you have a medical condition that is stable now, but that may recur while traveling?  yes  Has your spleen been removed?  No  Have you had an illness or a fever in the past 48 hours?  No  Are you pregnant, or might you become pregnant on this trip?  Any chance of pregnancy?  No  Are you breastfeeding?  No  Do you have HIV, AIDS, an AIDS-like condition, any other immune disorder, leukemia or cancer?  yes  Have you had your thymus gland removed or history of problems with your thymus, such as myasthenia gravis, DiGeorge syndrome, or thymoma?  No  Do you have a severely low platelet count (thrombocytopenia) or a blood clotting disorder?  No  Have you ever had a convulsion, seizure, epilepsy, neurologic condition or brain infection?  No  Do you have any stomach conditions?  No  Do you have severe renal or kidney  impairment?  No  Do you have a history of mental health concerns?  No  Do you get yeast infections often?  No  Do you have psoriasis?  No  Do you get motion sickness?  yes  Have you ever had headaches, nausea, vomiting, or breathing problems from altitude exposure?  yes    MEDICINES  Are you taking:   Steroids, prednisone, anti-cancer drugs, or medicines that suppress your immune system? No  Antibiotics or sulfonamides? No  Oral contraceptives (birth control pills)? No  Aspirin therapy (children and teens)? No    ALLERGIES  Are you allergic to:  Any medicines? No  Any foods or other? No  Neomycin, formalin, or fish products? No      Past Medical History:   Diagnosis Date    Cancer, skin, squamous cell     Cutaneous T-cell lymphoma (H)     Osteopenia       Immunization History   Administered Date(s) Administered    COVID-19 12+ (2023-24) (MODERNA) 09/23/2023, 03/12/2024    COVID-19 Bivalent 12+ (Pfizer) 10/22/2022, 04/24/2023    COVID-19 MONOVALENT 12+ (Pfizer) 02/10/2021, 03/04/2021, 10/04/2021    COVID-19 Monovalent 12+ (Pfizer 2022) 04/05/2022    Flu, Unspecified 01/11/2013    Hepatitis A (ADULT 19+) 11/17/2023    Hepatitis B, Adult 12/07/1993    Influenza (High Dose) 3 valent vaccine 08/26/2018, 10/01/2018, 09/18/2019    Influenza (IIV3) PF 12/19/2001, 11/15/2010, 01/11/2013, 06/14/2013, 11/05/2014, 10/07/2015, 10/21/2016    Influenza Vaccine 65+ (FLUAD) 10/22/2022, 10/20/2023    Influenza Vaccine 65+ (Fluzone HD) 10/04/2021    Influenza Vaccine >6 months,quad, PF 10/23/2016    Influenza Vaccine, 6+MO IM (QUADRIVALENT W/PRESERVATIVES) 10/21/2014    Meningococcal (Menomune ) 12/07/1993    Pneumo Conj 13-V (2010&after) 12/19/2017, 11/19/2018    Pneumococcal 23 valent 12/03/2019    RSV Vaccine (Arexvy) 09/16/2023    TDAP (Adacel,Boostrix) 06/13/2006, 02/06/2013, 04/10/2022    Typhoid IM 11/15/2023    Yellow Fever 12/07/1993    Zoster recombinant adjuvanted (SHINGRIX) 04/30/2018, 08/20/2018, 08/26/2018       Current  Outpatient Medications   Medication Sig Dispense Refill    acetaZOLAMIDE (DIAMOX) 125 MG tablet Take one tab every 12 hours starting 24 hours prior to PARO  and continue for 24 hours while at the highest altitude (Patient not taking: Reported on 3/5/2024) 10 tablet 0    atorvastatin (LIPITOR) 10 MG tablet Take 1 tablet (10 mg) by mouth daily 90 tablet 3    atovaquone-proguanil (MALARONE) 250-100 MG tablet Take 1 tablet by mouth daily Start 2 days before exposure to Malaria and continue daily till  7 days after exposure. (Patient not taking: Reported on 3/5/2024) 25 tablet 0    betamethasone dipropionate (DIPROSONE) 0.05 % external lotion Apply topically 2 times daily (Patient not taking: Reported on 3/5/2024) 60 mL 11    Bexarotene (TARGRETIN) 1 % GEL APPLY EVERY OTHER NIGHT TO EVERY NIGHT AS DIRECTED 60 g 5    Cholecalciferol (VITAMIN D-3 PO) Take 1 tablet by mouth daily      Coenzyme Q10 (CO Q-10 PO) Take 1 tablet by mouth daily.      FLUoxetine (PROZAC) 20 MG capsule TAKE 1 CAP ONCE DAILY AT BEDTIME      hydroquinone (DEEPALI) 4 % external cream Apply twice daily (Patient not taking: Reported on 3/5/2024) 45 g 0    hydrOXYzine (ATARAX) 25 MG tablet Take 1 tablet (25 mg) by mouth 3 times daily as needed for anxiety (Patient not taking: Reported on 3/5/2024) 20 tablet 0    Multiple Vitamin (MULTIVITAMINS PO) Take  by mouth daily.      Probiotic Product (SOLUBLE FIBER/PROBIOTICS PO) Take 2 tablets by mouth daily.      triamcinolone (KENALOG) 0.1 % external cream Use in morning to rash 454 g 5    valACYclovir (VALTREX) 500 MG tablet Take 1 tablet (500 mg) by mouth 2 times daily for 7 days 14 tablet 0    valACYclovir (VALTREX) 500 MG tablet Take 1 tablet (500 mg) by mouth 2 times daily for 7 days 14 tablet 0     Allergies   Allergen Reactions    Nkda [No Known Drug Allergy]         EXAM: deferred    Immunizations discussed include: Hepatitis A  Malaria prophylaxis recommended: not needed  Symptomatic treatment for  traveler's diarrhea: bismuth subsalicylate, loperamide/diphenoxylate, and azithromycin    ASSESSMENT/PLAN:    (Z71.84) Encounter for counseling for travel  (primary encounter diagnosis)    Comment: No vaccines today (will get Hepatitis A at pharmacy). Patient will return or follow-up with PCP as needed. Prophylaxis given for Traveler's diarrhea and is not needed for Malaria. All questions were answered.     Plan: azithromycin (ZITHROMAX) 500 MG tablet,         acetaZOLAMIDE (DIAMOX) 125 MG tablet            (Z23) Need for prophylactic vaccination against hepatitis A  Comment:   Plan: hepatitis A vaccine (VAQTA) 50 UNIT/ML         injection            (Z29.89) Altitude sickness preventative measures  Comment:   Plan: acetaZOLAMIDE (DIAMOX) 125 MG tablet              I have reviewed general recommendations for safe travel   including: food/water precautions, insect avoidance, safe sex   practices given high prevalence of HIV and other STDs,   roadway safety. Educational materials and links to the CDC   Traveler's health website have been provided.    Total time 23 minutes, greater than 50 percent in counseling   and coordination of care.

## 2024-09-16 ENCOUNTER — LAB (OUTPATIENT)
Dept: LAB | Facility: CLINIC | Age: 74
End: 2024-09-16
Payer: COMMERCIAL

## 2024-09-16 ENCOUNTER — OFFICE VISIT (OUTPATIENT)
Dept: INTERNAL MEDICINE | Facility: CLINIC | Age: 74
End: 2024-09-16
Payer: COMMERCIAL

## 2024-09-16 VITALS
BODY MASS INDEX: 27.03 KG/M2 | HEART RATE: 81 BPM | WEIGHT: 170 LBS | SYSTOLIC BLOOD PRESSURE: 118 MMHG | OXYGEN SATURATION: 98 % | DIASTOLIC BLOOD PRESSURE: 77 MMHG

## 2024-09-16 DIAGNOSIS — E78.00 HIGH CHOLESTEROL: ICD-10-CM

## 2024-09-16 DIAGNOSIS — Z13.1 SCREENING FOR DIABETES MELLITUS: ICD-10-CM

## 2024-09-16 DIAGNOSIS — C84.A0 CUTANEOUS T-CELL LYMPHOMA, UNSPECIFIED BODY REGION (H): ICD-10-CM

## 2024-09-16 DIAGNOSIS — Z00.00 ROUTINE GENERAL MEDICAL EXAMINATION AT A HEALTH CARE FACILITY: ICD-10-CM

## 2024-09-16 DIAGNOSIS — Z87.891 PERSONAL HISTORY OF TOBACCO USE, PRESENTING HAZARDS TO HEALTH: Primary | ICD-10-CM

## 2024-09-16 DIAGNOSIS — M85.80 OSTEOPENIA, UNSPECIFIED LOCATION: ICD-10-CM

## 2024-09-16 DIAGNOSIS — Z78.0 ASYMPTOMATIC POSTMENOPAUSAL STATUS: ICD-10-CM

## 2024-09-16 LAB
ALBUMIN SERPL BCG-MCNC: 4.2 G/DL (ref 3.5–5.2)
ALP SERPL-CCNC: 61 U/L (ref 40–150)
ALT SERPL W P-5'-P-CCNC: 22 U/L (ref 0–50)
ANION GAP SERPL CALCULATED.3IONS-SCNC: 12 MMOL/L (ref 7–15)
AST SERPL W P-5'-P-CCNC: 23 U/L (ref 0–45)
BILIRUB SERPL-MCNC: 0.4 MG/DL
BUN SERPL-MCNC: 11.9 MG/DL (ref 8–23)
CALCIUM SERPL-MCNC: 9.3 MG/DL (ref 8.8–10.4)
CHLORIDE SERPL-SCNC: 102 MMOL/L (ref 98–107)
CHOLEST SERPL-MCNC: 184 MG/DL
CREAT SERPL-MCNC: 0.73 MG/DL (ref 0.51–0.95)
EGFRCR SERPLBLD CKD-EPI 2021: 87 ML/MIN/1.73M2
ERYTHROCYTE [DISTWIDTH] IN BLOOD BY AUTOMATED COUNT: 12.9 % (ref 10–15)
FASTING STATUS PATIENT QL REPORTED: YES
FASTING STATUS PATIENT QL REPORTED: YES
GLUCOSE SERPL-MCNC: 101 MG/DL (ref 70–99)
HBA1C MFR BLD: 5.5 %
HCO3 SERPL-SCNC: 24 MMOL/L (ref 22–29)
HCT VFR BLD AUTO: 42.4 % (ref 35–47)
HDLC SERPL-MCNC: 62 MG/DL
HGB BLD-MCNC: 14.2 G/DL (ref 11.7–15.7)
LDLC SERPL CALC-MCNC: 93 MG/DL
MCH RBC QN AUTO: 32.4 PG (ref 26.5–33)
MCHC RBC AUTO-ENTMCNC: 33.5 G/DL (ref 31.5–36.5)
MCV RBC AUTO: 97 FL (ref 78–100)
NONHDLC SERPL-MCNC: 122 MG/DL
PLATELET # BLD AUTO: 261 10E3/UL (ref 150–450)
POTASSIUM SERPL-SCNC: 4 MMOL/L (ref 3.4–5.3)
PROT SERPL-MCNC: 6.9 G/DL (ref 6.4–8.3)
RBC # BLD AUTO: 4.38 10E6/UL (ref 3.8–5.2)
SODIUM SERPL-SCNC: 138 MMOL/L (ref 135–145)
TRIGL SERPL-MCNC: 147 MG/DL
VIT D+METAB SERPL-MCNC: 49 NG/ML (ref 20–50)
WBC # BLD AUTO: 7.3 10E3/UL (ref 4–11)

## 2024-09-16 PROCEDURE — 80053 COMPREHEN METABOLIC PANEL: CPT | Performed by: PATHOLOGY

## 2024-09-16 PROCEDURE — 85027 COMPLETE CBC AUTOMATED: CPT | Performed by: PATHOLOGY

## 2024-09-16 PROCEDURE — 36415 COLL VENOUS BLD VENIPUNCTURE: CPT | Performed by: PATHOLOGY

## 2024-09-16 PROCEDURE — 82306 VITAMIN D 25 HYDROXY: CPT | Performed by: INTERNAL MEDICINE

## 2024-09-16 PROCEDURE — 80061 LIPID PANEL: CPT | Performed by: PATHOLOGY

## 2024-09-16 PROCEDURE — 99000 SPECIMEN HANDLING OFFICE-LAB: CPT | Performed by: PATHOLOGY

## 2024-09-16 PROCEDURE — G0439 PPPS, SUBSEQ VISIT: HCPCS | Performed by: INTERNAL MEDICINE

## 2024-09-16 PROCEDURE — 83036 HEMOGLOBIN GLYCOSYLATED A1C: CPT | Performed by: INTERNAL MEDICINE

## 2024-09-16 RX ORDER — ATORVASTATIN CALCIUM 10 MG/1
10 TABLET, FILM COATED ORAL DAILY
Qty: 90 TABLET | Refills: 3 | Status: SHIPPED | OUTPATIENT
Start: 2024-09-16

## 2024-09-16 NOTE — PROGRESS NOTES
"Preventive Care Visit  [facility]  Lyndsey Garcia MD, Internal Medicine  Sep 16, 2024      Assessment & Plan     Personal history of tobacco use, presenting hazards to health  - CT Chest Lung Cancer Scrn Low Dose wo; Future    Asymptomatic postmenopausal status  - DEXA HIP/PELVIS/SPINE - Future; Future    Routine general medical examination at a health care facility  - REVIEW OF HEALTH MAINTENANCE PROTOCOL ORDERS    High cholesterol  - COMPREHENSIVE METABOLIC PANEL; Future  - Lipid panel reflex to direct LDL Non-fasting; Future  - atorvastatin (LIPITOR) 10 MG tablet; Take 1 tablet (10 mg) by mouth daily.    Osteopenia, unspecified location  - DEXA HIP/PELVIS/SPINE - Future; Future  - Vitamin D Deficiency; Future    Cutaneous T-cell lymphoma, unspecified body region (H)  - CBC with platelets; Future    Screening for diabetes mellitus  - Hemoglobin A1c; Future            BMI  Estimated body mass index is 27.03 kg/m  as calculated from the following:    Height as of 11/15/23: 1.689 m (5' 6.5\").    Weight as of this encounter: 77.1 kg (170 lb).       Counseling  Appropriate preventive services were addressed with this patient via screening, questionnaire, or discussion as appropriate for fall prevention, nutrition, physical activity, Tobacco-use cessation, social engagement, weight loss and cognition.  Checklist reviewing preventive services available has been given to the patient.  Reviewed patient's diet, addressing concerns and/or questions.   She is at risk for psychosocial distress and has been provided with information to reduce risk.   The patient was provided with written information regarding signs of hearing loss.           No follow-ups on file.    Subjective   Lyndsey is a 73 year old, presenting for the following:  Physical        9/16/2024     7:53 AM   Additional Questions   Roomed by MR ALEXIS SANDERS of [facility], SCC of skin    Skin is stable, follows for " [facility] with Dr. Guillory in remission, on targretin cream prn.    Went to [facility], [facility], [place] this past year, going to [place] soon, did travel visit  She tried ozempic this past year, x 6 months, 10 lbs, now off  Tried prozac for a few months  Still on lipitor  No health concerns at all today, NICK ricks    Works out with alicia 2/week, bikes, walks  No alcohol drinking    Has 3 sons:  Ophtho in [place]  Son in \[facility]", expecting a baby  [place] son      Routine Health Maintenence:  Lung Ca Screening (>20 pk age 50-80): discussed and agreeable  Colonoscopy (45-75 yrs): 9/16 normal, rec f/u 10 years  Dexa (>65W or 70M yrs):  9/22, ordered  Mammogram (40-75 yrs): 7/22, 7/24  Pap (21-65 yrs): no abnormal              9/11/2024   General Health   How would you rate your overall physical health? Good   Feel stress (tense, anxious, or unable to sleep) Only a little      (!) STRESS CONCERN      9/11/2024   Nutrition   Diet: Carbohydrate counting            9/11/2024   Exercise   Days per week of moderate/strenous exercise 4 days   Average minutes spent exercising at this level 60 min            9/11/2024   Social Factors   Frequency of gathering with friends or relatives More than three times a week   Worry food won't last until get money to buy more No   Food not last or not have enough money for food? No   Do you have housing? (Housing is defined as stable permanent housing and does not include staying ouside in a car, in a tent, in an abandoned building, in an overnight shelter, or couch-surfing.) Yes   Are you worried about losing your housing? No   Lack of transportation? No   Unable to get utilities (heat,electricity)? No            9/11/2024   Fall Risk   Fallen 2 or more times in the past year? No   Trouble with walking or balance? No             9/11/2024   Activities of Daily Living- Home Safety   Needs help with the following daily activites None of the above   Safety concerns in the home None of the  above            2024   Dental   Dentist two times every year? Yes            2024   Hearing Screening   Hearing concerns? (!) IT'S HARD TO FOLLOW A CONVERSATION IN A NOISY RESTAURANT OR CROWDED ROOM.    (!) TROUBLE UNDERSTANDING SOFT OR WHISPERED SPEECH.       Multiple values from one day are sorted in reverse-chronological order         2024   Driving Risk Screening   Patient/family members have concerns about driving No            2024   General Alertness/Fatigue Screening   Have you been more tired than usual lately? No            2024   Urinary Incontinence Screening   Bothered by leaking urine in past 6 months No            2024   TB Screening   Were you born outside of the [place]? No              Today's PHQ-2 Score:       3/5/2024     9:10 AM   PHQ-2 (  Pfizer)   Q1: Little interest or pleasure in doing things 0   Q2: Feeling down, depressed or hopeless 0   PHQ-2 Score 0         2024   Substance Use   Alcohol more than 3/day or more than 7/wk No   Do you have a current opioid prescription? No   How severe/bad is pain from 1 to 10? 0/10 (No Pain)   Do you use any other substances recreationally? (!) PRESCRIPTION DRUGS        Social History     Tobacco Use    Smoking status: Former     Current packs/day: 0.00     Average packs/day: 0.8 packs/day for 52.8 years (40.0 ttl pk-yrs)     Types: Cigarettes     Start date:      Quit date: 2010     Years since quittin.0    Smokeless tobacco: Never   Vaping Use    Vaping status: Never Used   Substance Use Topics    Alcohol use: Not Currently           2024   LAST FHS-7 RESULTS   1st degree relative breast or ovarian cancer No   Any relative bilateral breast cancer No   Any male have breast cancer No   Any ONE woman have BOTH breast AND ovarian cancer No   Any woman with breast cancer before 50yrs No   2 or more relatives with breast AND/OR ovarian cancer No   2 or more relatives with breast AND/OR bowel cancer No                ASCVD Risk   The 10-year ASCVD risk score (Derrick VARGAS, et al., 2019) is: 8.5%    Values used to calculate the score:      Age: 73 years      Sex: Female      Is Non- : No      Diabetic: No      Tobacco smoker: No      Systolic Blood Pressure: 118 mmHg      Is BP treated: No      HDL Cholesterol: 71 mg/dL      Total Cholesterol: 176 mg/dL            Reviewed and updated as needed this visit by Provider                      Current providers sharing in care for this patient include:  Patient Care Team:  Lyndsey Garcia MD as PCP - General (Internal Medicine)  Arlen Guillory MD as MD (Dermatology)  Magali Cruz MD as MD (Dermatology)  Andrea Rose MD as MD (Dermatology)  Lyndsey Garcia MD as Assigned PCP  Juan Daniel Cat MD as MD (Dermatology)  Mary Kerr MD as Assigned Surgical Provider    The following health maintenance items are reviewed in Epic and correct as of today:  Health Maintenance   Topic Date Due    DEXA  09/13/2024    CMP  09/15/2024    LIPID  09/15/2024    ANNUAL REVIEW OF HM ORDERS  09/15/2024    MEDICARE ANNUAL WELLNESS VISIT  09/15/2024    LUNG CANCER SCREENING  10/17/2024    COVID-19 Vaccine (10 - 2023-24 season) 11/06/2024    ADVANCE CARE PLANNING  07/13/2025    MAMMO SCREENING  07/16/2025    FALL RISK ASSESSMENT  09/16/2025    COLORECTAL CANCER SCREENING  09/02/2026    GLUCOSE  09/15/2026    DTAP/TDAP/TD IMMUNIZATION (4 - Td or Tdap) 04/10/2032    HEPATITIS C SCREENING  Completed    PHQ-2 (once per calendar year)  Completed    INFLUENZA VACCINE  Completed    Pneumococcal Vaccine: 65+ Years  Completed    ZOSTER IMMUNIZATION  Completed    RSV VACCINE  Completed    HPV IMMUNIZATION  Aged Out    MENINGITIS IMMUNIZATION  Aged Out    RSV MONOCLONAL ANTIBODY  Aged Out            Objective    Exam  /77 (BP Location: Right arm, Patient Position: Sitting, Cuff Size: Adult Regular)   Pulse 81   Wt 77.1 kg (170 lb)   LMP  (LMP Unknown)    "SpO2 98%   BMI 27.03 kg/m     Estimated body mass index is 27.03 kg/m  as calculated from the following:    Height as of 11/15/23: 1.689 m (5' 6.5\").    Weight as of this encounter: 77.1 kg (170 lb).    Physical Exam  Constitutional: Alert, oriented, pleasant, no acute distress  Head: Normocephalic, atraumatic  Eyes: Extra-ocular movements intact, no scleral icterus  ENT: Oropharynx clear, moist mucus membranes  Neck: Supple, no lymphadenopathy  Cardiovascular: Regular rate and rhythm, no murmurs, rubs or gallops, peripheral pulses full/symmetric  Respiratory: Good air movement bilaterally, lungs clear, no wheezes/rales/rhonchi  GI: Abdomen soft, bowel sounds present, nondistended, nontender, no organomegaly or masses, no rebound/guarding  Musculoskeletal: No edema, normal muscle tone, normal gait  Neurologic: Alert and oriented, cranial nerves 2-12 intact, no focal deficits  Skin: Few erythematous maculopapular lesions R chest, L flank  Psychiatric: normal mentation, affect and mood          9/16/2024   Mini Cog   Clock Draw Score 2 Normal   3 Item Recall 2 objects recalled   Mini Cog Total Score 4                 Signed Electronically by: Lyndsey Garcia MD    "

## 2025-01-27 ENCOUNTER — OFFICE VISIT (OUTPATIENT)
Dept: DERMATOLOGY | Facility: CLINIC | Age: 75
End: 2025-01-27
Attending: DERMATOLOGY
Payer: COMMERCIAL

## 2025-01-27 DIAGNOSIS — Z85.828 HISTORY OF NONMELANOMA SKIN CANCER: ICD-10-CM

## 2025-01-27 DIAGNOSIS — D23.9 BLUE NEVUS: ICD-10-CM

## 2025-01-27 DIAGNOSIS — D22.9 BENIGN NEVUS OF SKIN: ICD-10-CM

## 2025-01-27 DIAGNOSIS — L57.0 ACTINIC KERATOSIS: ICD-10-CM

## 2025-01-27 DIAGNOSIS — C84.00 MYCOSIS FUNGOIDES, UNSPECIFIED BODY REGION (H): Primary | ICD-10-CM

## 2025-01-27 DIAGNOSIS — L81.4 LENTIGINES: ICD-10-CM

## 2025-01-27 PROCEDURE — 17000 DESTRUCT PREMALG LESION: CPT | Mod: GC | Performed by: DERMATOLOGY

## 2025-01-27 PROCEDURE — 99213 OFFICE O/P EST LOW 20 MIN: CPT | Mod: 25 | Performed by: DERMATOLOGY

## 2025-01-27 ASSESSMENT — PAIN SCALES - GENERAL: PAINLEVEL_OUTOF10: NO PAIN (0)

## 2025-01-27 NOTE — NURSING NOTE
Dermatology Rooming Note    Lyndsey Hagen's goals for this visit include:   Chief Complaint   Patient presents with    Derm Problem     T cell lymphoma. Has one area of concern on the lower right leg and the left arm.      Dex Osorio, EMT  Clinic Support  [facility]     [phone]    Employed by [facility] [employer]

## 2025-01-27 NOTE — PROGRESS NOTES
[facility] Dermatology Note  Encounter Date: Jan 27, 2025  Office Visit     Dermatology Problem List:  1. 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 Treated at [place] prior to establishing care here. Stopped due to difficulty percuring oxsoralen and risks associated with PUVA.  Restarted on UVA1 with clinical remission.  Discontinued UVA1 3/12/2018  -Temporarily discontinued UVA1 (4/26/17) due to insurance  -Targretin 1% gel (added on 5/23/16) with good response.   -Triamcinolone 0.1% cream as needed for irritation from targretin gel.  2. Lesion to monitor, left superior parietal scalp Photo 8/27/2018  3. 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 Androgenic alopecia- LLLT 3x/week  5. ISK. LiqN2.   6. Solar lentiginosis      - hydroquinone 4% cream, Hydrating B5 cream, glycolic acid wash  7. botox years ago  8. Blue nevus, R dorsal forearm  9. Lesion to monitor, L calf.   - suspect DF. 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  No changes to management recommended at this time.  - Continue to monitor with skin checks every 6 months, sooner with concern    # Actinic keratosis, central frontal hairline  Discussed premalignant etiology.  Recommended treatment with cryotherapy.  Patient agrees.  - Cryotherapy (see below)    # Lesion to monitor, left mid calf.  7 mm pink irregular papule with peripheral brown reticulated pigment network.  Favor dermatofibroma, less likely basal cell carcinoma or squamous cell carcinoma.  Offered biopsy versus photos with monitoring; patient elects for the latter.  - Photos today  - Monitor at [facility] in 6-month    # History of NMSC. No evidence of recurrent disease.   # History of PUVA therapy  - Monitor: Patient  to continue monitoring at home and will contact the clinic for any changes.  - Sun protection: Counseled SPF30+ sunscreen, sun avoidance.  Handout provided  -Handout on ABCDEs of melanoma provided    # Benign findings including seborrheic keratoses, nevi, cherry angiomas, blue nevus (R dorsal forearm)  Reassured patient regarding the benign nature of these findings.   - continue to monitor      Procedures Performed:   - Cryotherapy procedure note, location(s): mid frontal hairline. After verbal consent and discussion of risks and benefits including, but not limited to, dyspigmentation/scar, blister, and pain, 1 lesion(s) was(were) treated with 1-2 mm freeze border for 1-2 cycles with liquid nitrogen. Post cryotherapy instructions were provided.    Follow-up: 6m in person for [facility], follow up MF    Staff: Dr. Pastora Arvizu MD PGY-4  Dermatology Resident  I was present for the entire procedure. Arlen JORDAN I, Arlen Guillory MD, saw this patient with the resident and agree with the resident s findings and plan of care as documented in the resident s note.    ____________________________________________    CC: Derm Problem (T cell lymphoma. Has one area of concern on the lower right leg and the left arm. )    HPI:  Ms. Lyndsey Hagen is a(n) 74 year old female who presents today as a return patient for follow-up of stage IIb MF.  Last seen in the clinic on 7/16/2024.    Today, patient states that overall she and her skin are doing well.  She notes no activity for the CTCL.  She does however have a lesion of concern on the right shin.  She thinks that she might have traumatized it roughly 8 weeks ago.  It is not tender or bleeding.  She also notes an area of a blue macule on her right dorsal forearm that she wonders if it may be new.  Otherwise no new concerning lesions.  Wears sunscreen regularly.  Has not been treating CTCL with any topical medications because it is in remission.    Patient  denies any new weight loss, night sweats, fevers, chills or lymphadenopathy appreciated.  She states that her health is quite good overall    Patient is otherwise feeling well, without additional skin concerns.    Labs Reviewed:  N/A    Physical Exam:  Vitals: LMP  (LMP Unknown)   SKIN: Full skin, which includes the head/face, both arms, chest, back, abdomen,both legs, genitalia and/or groin buttocks, digits and/or nails, was examined.  -On the right dorsal forearm there is a 1 to 2 mm blue well-circumscribed macule.  Adjacent to it are 2 brown macules  - On the central frontal hairline there is a gritty pink papule  - On the left calf there is a 7 mm somewhat irregular pink centrally fibrotic appearing papule with peripheral reticulated brown pigment network  - several bright red dome shaped papules on the trunk and extremities  -A few waxy, stuck on appearing papules on the trunk and extremities  -Several brown macules and papules with regular borders on the trunk and extremities, as well as the buttocks  - No cervical, occipital, posterior auricular, axillary, or clavicular lymphadenopathy  - No other lesions of concern on areas examined.               Medications:  Current Outpatient Medications   Medication Sig Dispense Refill    atorvastatin (LIPITOR) 10 MG tablet Take 1 tablet (10 mg) by mouth daily. 90 tablet 3    Bexarotene (TARGRETIN) 1 % GEL APPLY EVERY OTHER NIGHT TO EVERY NIGHT AS DIRECTED 60 g 5    Cholecalciferol (VITAMIN D-3 PO) Take 1 tablet by mouth daily      Multiple Vitamin (MULTIVITAMINS PO) Take  by mouth daily.      Probiotic Product (SOLUBLE FIBER/PROBIOTICS PO) Take 2 tablets by mouth daily.      triamcinolone (KENALOG) 0.1 % external cream Use in morning to rash 454 g 5    acetaZOLAMIDE (DIAMOX) 125 MG tablet Take 1 tablet (125 mg) by mouth 2 times daily Start one day prior to ascent, continue for 2-3 days at higher elevation - to prevent altitude illness 8 tablet 0    azithromycin  (ZITHROMAX) 500 MG tablet Take one tablet daily for up to 3 days as needed for traveler's diarrhea 3 tablet 0     Current Facility-Administered Medications   Medication Dose Route Frequency Provider Last Rate Last Admin    lidocaine 1% with EPINEPHrine 1:100,000 injection 3 mL  3 mL Intradermal Once Arlen Guillory MD          Past Medical History:   Patient Active Problem List   Diagnosis    Cutaneous T-cell lymphoma (H)     Past Medical History:   Diagnosis Date    Cancer, skin, squamous cell     Cutaneous T-cell lymphoma (H)     Depressive disorder 01/2004    Osteopenia        CC Arlen Guillory MD  [address] [facility] [address]  [place] [address] on close of this encounter.

## 2025-01-27 NOTE — PATIENT INSTRUCTIONS
Cryotherapy    What is it?  Use of a very cold liquid, such as liquid nitrogen, to freeze and destroy abnormal skin cells that need to be removed    What should I expect?  Tenderness and redness  A small blister that might grow and fill with dark purple blood. There may be crusting.  More than one treatment may be needed if the lesions do not go away.    How do I care for the treated area?  Gently wash the area with your hands when bathing.  Use a thin layer of Vaseline to help with healing. You may use a Band-Aid.   The area should heal within 7-10 days and may leave behind a pink or lighter color.   Do not use an antibiotic or Neosporin ointment.   You may take acetaminophen (Tylenol) for pain.     Call your doctor if you have:  Severe pain  Signs of infection (warmth, redness, cloudy yellow drainage, and or a bad smell)  Questions or concerns    Who should I call with questions?      [facility]: [phone]      [facility]: [phone]      For urgent needs outside of business hours call the [facility] at [phone] and ask for the dermatology resident on call                Checking for Skin Cancer  You can help find cancer early by checking your skin each month. There are 3 main kinds of skin cancer: melanoma, basal cell carcinoma, and squamous cell carcinoma. Doing monthly skin checks is the best way to find new marks, sores, or skin changes. Follow these instructions for checking your skin.   The ABCDEs of checking moles for melanoma   Check your moles or growths for signs of melanoma using ABCDE:   Asymmetry: The sides of the mole or growth don t match.  Border: The edges are ragged, notched, or blurred.  Color: The color within the mole or growth varies. It could be black, brown, tan, white, or shades of red, gray, or blue.  Diameter: The mole or growth is larger than   inch or 6 mm (size of a pencil eraser).  Evolving: The size,  shape, texture, or color of the mole or growth is changing.     ABCDE's of moles on light skin.        ABCDE's of moles on dark skin may be harder to identify.     Checking for other types of skin cancer  Basal cell carcinoma or squamous cell carcinoma cause symptoms like:     A spot or mole that looks different from all other marks on your skin  Changes in how an area feels, such as itching, tenderness, or pain  Changes in the skin's surface, such as oozing, bleeding, or scaliness  A sore that doesn't heal  New swelling, redness, or spread of color beyond the border of a mole    Who s at risk?  Anyone of any skin color can get skin cancer. But you're at greater risk if you have:   Fair skin that freckles easily and burns instead of tanning  Light-colored or red hair  Light-colored eyes  Many moles or abnormal moles on your skin  A long history of unprotected exposure to sunlight or tanning beds  A history of many blistering sunburns as a child or teen  A family history of skin cancer  Been exposed to radiation or chemicals  A weakened immune system  Been exposed to arsenic  If you've had skin cancer in the past, you're at high risk of having it again.   How to check your skin  Do your monthly skin checkups in front of a full-length mirror. Use a room with good lighting so it's easier to see. Use a hand mirror to look at hard-to-see places like your buttocks and back. You can also have a trusted friend or family member help you with these checks. Check every part of your body, including your:   Head (ears, face, neck, and scalp)  Torso (front, back, sides, and under breasts)  Arms (tops, undersides, and armpits)  Hands (palms, backs, and fingers, including under the nails)  Lower back, buttocks, and genitals  Legs (front, back, and sides)  Feet (tops, soles, toes, including under the nails, and between toes)  Watch for new spots on your skin or a spot that's changing in color, shape, size.   If you have a lot of  moles, take digital photos of them each month. Make sure to take photos both up close and from a distance. These can help you see if any moles change over time.   Know your skin  Most skin changes aren't cancer. But if you see any changes in your skin, call your healthcare provider right away. Only they can tell you if a change is a problem. If you have skin cancer, seeing your provider can be the first step to getting the treatment that could save your life.   [employer] last reviewed this educational content on 10/1/2021    6899-8236 [employer]. All rights reserved. This information is not intended as a substitute for professional medical care. Always follow your healthcare professional's instructions.

## 2025-01-27 NOTE — LETTER
1/27/2025       RE: Lyndsey Hagen  [address]  [place] [address]     Dear Colleague,    Thank you for referring your patient, Lyndsey Hagen, to the [facility] DERMATOLOGY [facility] [place] [facility]. Please see a copy of my visit note below.    [facility] Dermatology Note  Encounter Date: Jan 27, 2025  Office Visit     Dermatology Problem List:  1. 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 Treated at [place] prior to establishing care here. Stopped due to difficulty percuring oxsoralen and risks associated with PUVA.  Restarted on UVA1 with clinical remission.  Discontinued UVA1 3/12/2018  -Temporarily discontinued UVA1 (4/26/17) due to insurance  -Targretin 1% gel (added on 5/23/16) with good response.   -Triamcinolone 0.1% cream as needed for irritation from targretin gel.  2. Lesion to monitor, left superior parietal scalp Photo 8/27/2018  3. 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 Androgenic alopecia- LLLT 3x/week  5. ISK. LiqN2.   6. Solar lentiginosis      - hydroquinone 4% cream, Hydrating B5 cream, glycolic acid wash  7. botox years ago  8. Blue nevus, R dorsal forearm  9. Lesion to monitor, L calf.   - suspect DF. 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  No changes to management recommended at this time.  - Continue to monitor with skin checks every 6 months, sooner with concern    # Actinic keratosis, central frontal hairline  Discussed premalignant etiology.  Recommended treatment with cryotherapy.  Patient agrees.  - Cryotherapy (see below)    # Lesion to monitor, left mid calf.  7 mm pink irregular papule with peripheral brown reticulated pigment  network.  Favor dermatofibroma, less likely basal cell carcinoma or squamous cell carcinoma.  Offered biopsy versus photos with monitoring; patient elects for the latter.  - Photos today  [facility]    # History of NMSC. No evidence of recurrent disease.   # History of PUVA therapy  - Monitor: Patient to continue monitoring at home and will contact the clinic for any changes.  - Sun protection: Counseled SPF30+ sunscreen, sun avoidance.  Handout provided  -Handout on ABCDEs of melanoma provided    # Benign findings including seborrheic keratoses, nevi, cherry angiomas, blue nevus (R dorsal forearm)  Reassured patient regarding the benign nature of these findings.   - continue to monitor      Procedures Performed:   - Cryotherapy procedure note, location(s): mid frontal hairline. After verbal consent and discussion of risks and benefits including, but not limited to, dyspigmentation/scar, blister, and pain, 1 lesion(s) was(were) treated with 1-2 mm freeze border for 1-2 cycles with liquid nitrogen. Post cryotherapy instructions were provided.    Follow-up: 6m in person for [facility], follow up MF    Staff: Dr. Pastora Arvizu MD PGY-4  Dermatology Resident  I was present for the entire procedure. Arlen Guillory MD   I, Arlen Guillory MD, saw this patient with the resident and agree with the resident s findings and plan of care as documented in the resident s note.    ____________________________________________    CC: Derm Problem (T cell lymphoma. Has one area of concern on the lower right leg and the left arm. )    HPI:  Ms. Lyndsey Hagen is a(n) 74 year old female who presents today as a return patient for follow-up of stage IIb MF.  Last seen in the clinic on 7/16/2024.    Today, patient states that overall she and her skin are doing well.  She notes no activity for the CTCL.  She does however have a lesion of concern on the right shin.  She thinks that she might have traumatized  it roughly 8 weeks ago.  It is not tender or bleeding.  She also notes an area of a blue macule on her right dorsal forearm that she wonders if it may be new.  Otherwise no new concerning lesions.  Wears sunscreen regularly.  Has not been treating CTCL with any topical medications because it is in remission.    Patient denies any new weight loss, night sweats, fevers, chills or lymphadenopathy appreciated.  She states that her health is quite good overall    Patient is otherwise feeling well, without additional skin concerns.    Labs Reviewed:  N/A    Physical Exam:  Vitals: LMP  (LMP Unknown)   SKIN: Full skin, which includes the head/face, both arms, chest, back, abdomen,both legs, genitalia and/or groin buttocks, digits and/or nails, was examined.  -On the right dorsal forearm there is a 1 to 2 mm blue well-circumscribed macule.  Adjacent to it are 2 brown macules  - On the central frontal hairline there is a gritty pink papule  - On the left calf there is a 7 mm somewhat irregular pink centrally fibrotic appearing papule with peripheral reticulated brown pigment network  - several bright red dome shaped papules on the trunk and extremities  -A few waxy, stuck on appearing papules on the trunk and extremities  -Several brown macules and papules with regular borders on the trunk and extremities, as well as the buttocks  - No cervical, occipital, posterior auricular, axillary, or clavicular lymphadenopathy  - No other lesions of concern on areas examined.               Medications:  Current Outpatient Medications   Medication Sig Dispense Refill     atorvastatin (LIPITOR) 10 MG tablet Take 1 tablet (10 mg) by mouth daily. 90 tablet 3     Bexarotene (TARGRETIN) 1 % GEL APPLY EVERY OTHER NIGHT TO EVERY NIGHT AS DIRECTED 60 g 5     Cholecalciferol (VITAMIN D-3 PO) Take 1 tablet by mouth daily       Multiple Vitamin (MULTIVITAMINS PO) Take  by mouth daily.       Probiotic Product (SOLUBLE FIBER/PROBIOTICS PO) Take 2  tablets by mouth daily.       triamcinolone (KENALOG) 0.1 % external cream Use in morning to rash 454 g 5     acetaZOLAMIDE (DIAMOX) 125 MG tablet Take 1 tablet (125 mg) by mouth 2 times daily Start one day prior to ascent, continue for 2-3 days at higher elevation - to prevent altitude illness 8 tablet 0     azithromycin (ZITHROMAX) 500 MG tablet Take one tablet daily for up to 3 days as needed for traveler's diarrhea 3 tablet 0     Current Facility-Administered Medications   Medication Dose Route Frequency Provider Last Rate Last Admin     lidocaine 1% with EPINEPHrine 1:100,000 injection 3 mL  3 mL Intradermal Once Arlen Guillory MD          Past Medical History:   Patient Active Problem List   Diagnosis     Cutaneous T-cell lymphoma (H)     Past Medical History:   Diagnosis Date     Cancer, skin, squamous cell      Cutaneous T-cell lymphoma (H)      Depressive disorder 01/2004     Osteopenia        CC Arlen Guillory MD  [address]  [place] [address] on close of this encounter.      Again, thank you for allowing me to participate in the care of your patient.      Sincerely,    Arlen Guillory MD

## 2025-02-10 ENCOUNTER — ANCILLARY PROCEDURE (OUTPATIENT)
Dept: BONE DENSITY | Facility: CLINIC | Age: 75
End: 2025-02-10
Attending: INTERNAL MEDICINE
Payer: COMMERCIAL

## 2025-02-10 DIAGNOSIS — M85.80 OSTEOPENIA, UNSPECIFIED LOCATION: ICD-10-CM

## 2025-02-10 DIAGNOSIS — Z78.0 ASYMPTOMATIC POSTMENOPAUSAL STATUS: ICD-10-CM

## 2025-02-10 PROCEDURE — 77091 TBS TECHL CALCULATION ONLY: CPT

## 2025-02-10 PROCEDURE — 77080 DXA BONE DENSITY AXIAL: CPT | Mod: TC

## 2025-02-14 ENCOUNTER — ANCILLARY PROCEDURE (OUTPATIENT)
Dept: CT IMAGING | Facility: CLINIC | Age: 75
End: 2025-02-14
Attending: INTERNAL MEDICINE
Payer: COMMERCIAL

## 2025-02-14 DIAGNOSIS — Z87.891 PERSONAL HISTORY OF TOBACCO USE, PRESENTING HAZARDS TO HEALTH: ICD-10-CM

## 2025-02-14 PROCEDURE — 71271 CT THORAX LUNG CANCER SCR C-: CPT | Mod: GC | Performed by: RADIOLOGY

## 2025-03-09 ENCOUNTER — HEALTH MAINTENANCE LETTER (OUTPATIENT)
Age: 75
End: 2025-03-09

## 2025-07-28 ENCOUNTER — OFFICE VISIT (OUTPATIENT)
Dept: DERMATOLOGY | Facility: CLINIC | Age: 75
End: 2025-07-28
Attending: DERMATOLOGY
Payer: COMMERCIAL

## 2025-07-28 DIAGNOSIS — L82.1 SK (SEBORRHEIC KERATOSIS): ICD-10-CM

## 2025-07-28 DIAGNOSIS — D22.9 MULTIPLE BENIGN NEVI: ICD-10-CM

## 2025-07-28 DIAGNOSIS — C84.00 MYCOSIS FUNGOIDES, UNSPECIFIED BODY REGION (H): Primary | ICD-10-CM

## 2025-07-28 DIAGNOSIS — Z85.828 HISTORY OF NONMELANOMA SKIN CANCER: ICD-10-CM

## 2025-07-28 ASSESSMENT — PAIN SCALES - GENERAL: PAINLEVEL_OUTOF10: NO PAIN (0)

## 2025-07-28 NOTE — NURSING NOTE
"Dermatology Rooming Note    Lyndsey Hagen's goals for this visit include:   Chief Complaint   Patient presents with    Derm Problem     Mycosis fungiodes- She reports no spots or areas of concern. Says that her skin is doing \"good\".     Paulo Santiago, RMEZ    "

## 2025-07-28 NOTE — PROGRESS NOTES
[facility] Dermatology Note  Encounter Date: Jul 28, 2025  Office Visit     Dermatology Problem List:  Last FBSE: 7/28/25  1. 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 Treated at [place] prior to establishing care here. Stopped due to difficulty percuring oxsoralen and risks associated with PUVA.  -Temporarily discontinued UVA1 (4/26/17) due to insurance. Restarted on UVA1 with clinical remission.  Discontinued UVA1 3/12/2018.  -Targretin 1% gel (added on 5/23/16) with good response.   -Triamcinolone 0.1% cream as needed for irritation from targretin gel.  2. 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 Androgenic alopecia - LLLT 3x/week  4. ISK s/p LiqN2  5. Solar lentiginosis      - hydroquinone 4% cream, hydrating B5 cream, glycolic acid wash  6. Botox years ago  7. Blue nevus, R dorsal forearm  8. Lesions to monitor   - left superior parietal scalp; photo 8/27/2018  - left mid-calf, resolving photo 1/27/25  - left mid-shin, suspect sebK; photos 7/28/25   ____________________________________________  Assessment & Plan:   # Mycosis fungiodes, stage IIB, clinical remission  In clinical remission, without any examination findings or history of concern for active disease. No changes to management recommended at this time.  - Continue to monitor with skin checks every 6 months, sooner with concern    # Lesion to monitor, left mid-shin  8mm tan waxy, stuck on appearance without pain on scratching. However, vessel appreciated on dermoscopy. Favor seborrheic keratosis with background telangiectasia, less likely basal cell carcinoma or squamous cell carcinoma. Offered biopsy versus photos with monitoring; patient elects for the latter.  - Photos today  - Monitor at [facility] in 6-month    # Lesion to monitor, left mid-calf, nearly resolved  Prior 7 mm pink irregular papule with peripheral brown reticulated pigment network.  " Favored dermatofibroma, less likely basal cell carcinoma or squamous cell carcinoma. Previously offered biopsy versus photos with monitoring; patient elects for the latter.  - CTM at [facility] in 6-month     # History of NMSC. No evidence of recurrent disease.   # History of PUVA therapy  - Monitor: Patient to continue monitoring at home and will contact the clinic for any changes.  - Sun protection: Counseled SPF30+ sunscreen, sun avoidance.     # Benign findings including seborrheic keratoses, nevi, cherry angiomas, blue nevus (R dorsal forearm)  Reassured patient regarding the benign nature of these findings.   - CTM    Follow-up: 6 month(s) in-person, or earlier for new or changing lesions    Staff and Resident:     Maricruz ANTONIO MD - Med-Derm PGY-2, saw and staffed this patient with attending physician, Dr. Pastora ANTONIO, Arlen Guillory MD, saw this patient with the resident and agree with the resident s findings and plan of care as documented in the resident s note.    ____________________________________________    CC: Derm Problem (Mycosis fungiodes- She reports no spots or areas of concern. Says that her skin is doing \"good\".)    HPI:  Ms. Lyndsey Hagen is a(n) 74 year old female presents today as a return patient for follow-up and [facility]    Patient is otherwise feeling well, without additional skin concerns.    Labs Reviewed:  N/A    Physical Exam:  Vitals: LMP  (LMP Unknown)   SKIN: Full skin, which includes the head/face, both arms, chest, back, abdomen,both legs, genitalia and/or groin buttocks, digits and/or nails, was examined.  - Multiple regular brown pigmented macules and papules are identified on the trunk and extremities. One tan macule with regular pigment on scalp  - Scattered brown macules on sun exposed areas.  - There are dome shaped bright red papules on the trunk and extremities.   - Waxy stuck on papules and plaques on trunk and extremities.    - Left mid calf: light brown tan macule, " improved from prior photos  - No other lesions of concern on areas examined.     Medications:  Current Outpatient Medications   Medication Sig Dispense Refill    atorvastatin (LIPITOR) 10 MG tablet Take 1 tablet (10 mg) by mouth daily. 90 tablet 3    Bexarotene (TARGRETIN) 1 % GEL APPLY EVERY OTHER NIGHT TO EVERY NIGHT AS DIRECTED 60 g 5    Cholecalciferol (VITAMIN D-3 PO) Take 1 tablet by mouth daily      Multiple Vitamin (MULTIVITAMINS PO) Take  by mouth daily.      Probiotic Product (SOLUBLE FIBER/PROBIOTICS PO) Take 2 tablets by mouth daily.      triamcinolone (KENALOG) 0.1 % external cream Use in morning to rash 454 g 5    acetaZOLAMIDE (DIAMOX) 125 MG tablet Take 1 tablet (125 mg) by mouth 2 times daily Start one day prior to ascent, continue for 2-3 days at higher elevation - to prevent altitude illness 8 tablet 0    azithromycin (ZITHROMAX) 500 MG tablet Take one tablet daily for up to 3 days as needed for traveler's diarrhea 3 tablet 0     No current facility-administered medications for this visit.      Past Medical History:   Patient Active Problem List   Diagnosis    Cutaneous T-cell lymphoma (H)    Mycosis fungoides, unspecified body region (H)    History of nonmelanoma skin cancer     Past Medical History:   Diagnosis Date    Cancer, skin, squamous cell     Cutaneous T-cell lymphoma (H)     Depressive disorder 01/2004    Osteopenia        CC Arlen Guillory MD  [address]  [place] [address] on close of this encounter.

## 2025-07-28 NOTE — LETTER
7/28/2025       RE: Lyndsey Hagen  [address]  [place] [address]     Dear Colleague,    Thank you for referring your patient, Lyndsey Hagen, to the [facility] [place] [facility]. Please see a copy of my visit note below.    [facility] Dermatology Note  Encounter Date: Jul 28, 2025  Office Visit     Dermatology Problem List:  Last FBSE: 7/28/25  1. 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 Treated at [place] prior to establishing care here. Stopped due to difficulty percuring oxsoralen and risks associated with PUVA.  -Temporarily discontinued UVA1 (4/26/17) due to insurance. Restarted on UVA1 with clinical remission.  Discontinued UVA1 3/12/2018.  -Targretin 1% gel (added on 5/23/16) with good response.   -Triamcinolone 0.1% cream as needed for irritation from targretin gel.  2. 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 Androgenic alopecia - LLLT 3x/week  4. ISK s/p LiqN2  5. Solar lentiginosis      - hydroquinone 4% cream, hydrating B5 cream, glycolic acid wash  6. Botox years ago  7. Blue nevus, R dorsal forearm  8. Lesions to monitor   - left superior parietal scalp; photo 8/27/2018  - left mid-calf, resolving photo 1/27/25  - left mid-shin, suspect sebK; photos 7/28/25   ____________________________________________  Assessment & Plan:   # Mycosis fungiodes, stage IIB, clinical remission  In clinical remission, without any examination findings or history of concern for active disease. No changes to management recommended at this time.  - Continue to monitor with skin checks every 6 months, sooner with concern    # Lesion to monitor, left mid-shin  8mm tan waxy, stuck on appearance without pain on scratching. However, vessel appreciated on dermoscopy. Favor seborrheic keratosis with background telangiectasia, less  "likely basal cell carcinoma or squamous cell carcinoma. Offered biopsy versus photos with monitoring; patient elects for the latter.  - Photos today  - Monitor at [facility] in 6-month    # Lesion to monitor, left mid-calf, nearly resolved  Prior 7 mm pink irregular papule with peripheral brown reticulated pigment network.  Favored dermatofibroma, less likely basal cell carcinoma or squamous cell carcinoma. Previously offered biopsy versus photos with monitoring; patient elects for the latter.  - CTM at [facility] in 6-month     # History of NMSC. No evidence of recurrent disease.   # History of PUVA therapy  - Monitor: Patient to continue monitoring at home and will contact the clinic for any changes.  - Sun protection: Counseled SPF30+ sunscreen, sun avoidance.     # Benign findings including seborrheic keratoses, nevi, cherry angiomas, blue nevus (R dorsal forearm)  Reassured patient regarding the benign nature of these findings.   - CTM    Follow-up: 6 month(s) in-person, or earlier for new or changing lesions    Staff and Resident:     IMaricruz MD - Med-Derm PGY-2, saw and staffed this patient with attending physician, Dr. Pastora ANTONIO, Arlen Guillory MD, saw this patient with the resident and agree with the resident s findings and plan of care as documented in the resident s note.    ____________________________________________    CC: Derm Problem (Mycosis fungiodes- She reports no spots or areas of concern. Says that her skin is doing \"good\".)    HPI:  Ms. Lyndsey Hagen is a(n) 74 year old female presents today as a return patient for follow-up and [facility]    Patient is otherwise feeling well, without additional skin concerns.    Labs Reviewed:  N/A    Physical Exam:  Vitals: LMP  (LMP Unknown)   SKIN: Full skin, which includes the head/face, both arms, chest, back, abdomen,both legs, genitalia and/or groin buttocks, digits and/or nails, was examined.  - Multiple regular brown pigmented macules and papules are " identified on the trunk and extremities. One tan macule with regular pigment on scalp  - Scattered brown macules on sun exposed areas.  - There are dome shaped bright red papules on the trunk and extremities.   - Waxy stuck on papules and plaques on trunk and extremities.    - Left mid calf: light brown tan macule, improved from prior photos  - No other lesions of concern on areas examined.     Medications:  Current Outpatient Medications   Medication Sig Dispense Refill     atorvastatin (LIPITOR) 10 MG tablet Take 1 tablet (10 mg) by mouth daily. 90 tablet 3     Bexarotene (TARGRETIN) 1 % GEL APPLY EVERY OTHER NIGHT TO EVERY NIGHT AS DIRECTED 60 g 5     Cholecalciferol (VITAMIN D-3 PO) Take 1 tablet by mouth daily       Multiple Vitamin (MULTIVITAMINS PO) Take  by mouth daily.       Probiotic Product (SOLUBLE FIBER/PROBIOTICS PO) Take 2 tablets by mouth daily.       triamcinolone (KENALOG) 0.1 % external cream Use in morning to rash 454 g 5     acetaZOLAMIDE (DIAMOX) 125 MG tablet Take 1 tablet (125 mg) by mouth 2 times daily Start one day prior to ascent, continue for 2-3 days at higher elevation - to prevent altitude illness 8 tablet 0     azithromycin (ZITHROMAX) 500 MG tablet Take one tablet daily for up to 3 days as needed for traveler's diarrhea 3 tablet 0     No current facility-administered medications for this visit.      Past Medical History:   Patient Active Problem List   Diagnosis     Cutaneous T-cell lymphoma (H)     Mycosis fungoides, unspecified body region (H)     History of nonmelanoma skin cancer     Past Medical History:   Diagnosis Date     Cancer, skin, squamous cell      Cutaneous T-cell lymphoma (H)      Depressive disorder 01/2004     Osteopenia        CC Arlen Guillory MD  [address]  [place] [address] on close of this encounter.    Again, thank you for allowing me to participate in the care of your patient.      Sincerely,    Arlen Guillory MD

## (undated) RX ORDER — LIDOCAINE 40 MG/G
CREAM TOPICAL
Status: DISPENSED
Start: 2023-11-15

## (undated) RX ORDER — TRIAMCINOLONE ACETONIDE 40 MG/ML
INJECTION, SUSPENSION INTRA-ARTICULAR; INTRAMUSCULAR
Status: DISPENSED
Start: 2023-08-07